# Patient Record
Sex: MALE | Race: WHITE | NOT HISPANIC OR LATINO | Employment: OTHER | URBAN - METROPOLITAN AREA
[De-identification: names, ages, dates, MRNs, and addresses within clinical notes are randomized per-mention and may not be internally consistent; named-entity substitution may affect disease eponyms.]

---

## 2017-02-14 ENCOUNTER — COMMUNICATION - HEALTHEAST (OUTPATIENT)
Dept: FAMILY MEDICINE | Facility: CLINIC | Age: 72
End: 2017-02-14

## 2017-02-14 DIAGNOSIS — E78.5 HYPERLIPIDEMIA: ICD-10-CM

## 2017-02-14 DIAGNOSIS — K21.00 REFLUX ESOPHAGITIS: ICD-10-CM

## 2017-04-18 ENCOUNTER — RECORDS - HEALTHEAST (OUTPATIENT)
Dept: ADMINISTRATIVE | Facility: OTHER | Age: 72
End: 2017-04-18

## 2017-05-08 ENCOUNTER — RECORDS - HEALTHEAST (OUTPATIENT)
Dept: ADMINISTRATIVE | Facility: OTHER | Age: 72
End: 2017-05-08

## 2017-05-22 ENCOUNTER — RECORDS - HEALTHEAST (OUTPATIENT)
Dept: ADMINISTRATIVE | Facility: OTHER | Age: 72
End: 2017-05-22

## 2017-10-17 ENCOUNTER — RECORDS - HEALTHEAST (OUTPATIENT)
Dept: ADMINISTRATIVE | Facility: OTHER | Age: 72
End: 2017-10-17

## 2017-10-23 ENCOUNTER — RECORDS - HEALTHEAST (OUTPATIENT)
Dept: ADMINISTRATIVE | Facility: OTHER | Age: 72
End: 2017-10-23

## 2017-11-21 ENCOUNTER — AMBULATORY - HEALTHEAST (OUTPATIENT)
Dept: NURSING | Facility: CLINIC | Age: 72
End: 2017-11-21

## 2017-11-21 DIAGNOSIS — Z23 IMMUNIZATION DUE: ICD-10-CM

## 2017-12-26 ENCOUNTER — RECORDS - HEALTHEAST (OUTPATIENT)
Dept: ADMINISTRATIVE | Facility: OTHER | Age: 72
End: 2017-12-26

## 2018-02-06 ENCOUNTER — OFFICE VISIT - HEALTHEAST (OUTPATIENT)
Dept: FAMILY MEDICINE | Facility: CLINIC | Age: 73
End: 2018-02-06

## 2018-02-06 DIAGNOSIS — H26.9 CATARACTS, BILATERAL: ICD-10-CM

## 2018-02-06 DIAGNOSIS — R73.09 OTHER ABNORMAL GLUCOSE: ICD-10-CM

## 2018-02-06 DIAGNOSIS — Z01.818 PRE-OP EXAMINATION: ICD-10-CM

## 2018-02-06 DIAGNOSIS — K21.00 REFLUX ESOPHAGITIS: ICD-10-CM

## 2018-02-06 DIAGNOSIS — E78.5 HYPERLIPIDEMIA: ICD-10-CM

## 2018-02-06 LAB
ALBUMIN SERPL-MCNC: 3.8 G/DL (ref 3.5–5)
ALP SERPL-CCNC: 66 U/L (ref 45–120)
ALT SERPL W P-5'-P-CCNC: 18 U/L (ref 0–45)
ANION GAP SERPL CALCULATED.3IONS-SCNC: 8 MMOL/L (ref 5–18)
AST SERPL W P-5'-P-CCNC: 16 U/L (ref 0–40)
ATRIAL RATE - MUSE: 48 BPM
BILIRUB DIRECT SERPL-MCNC: 0.2 MG/DL
BILIRUB SERPL-MCNC: 0.5 MG/DL (ref 0–1)
BUN SERPL-MCNC: 21 MG/DL (ref 8–28)
CALCIUM SERPL-MCNC: 9.5 MG/DL (ref 8.5–10.5)
CHLORIDE BLD-SCNC: 106 MMOL/L (ref 98–107)
CO2 SERPL-SCNC: 28 MMOL/L (ref 22–31)
CREAT SERPL-MCNC: 0.8 MG/DL (ref 0.7–1.3)
DIASTOLIC BLOOD PRESSURE - MUSE: NORMAL MMHG
GFR SERPL CREATININE-BSD FRML MDRD: >60 ML/MIN/1.73M2
GLUCOSE BLD-MCNC: 113 MG/DL (ref 70–125)
HBA1C MFR BLD: 6.6 % (ref 3.5–6)
HGB BLD-MCNC: 15.6 G/DL (ref 14–18)
INTERPRETATION ECG - MUSE: NORMAL
P AXIS - MUSE: 82 DEGREES
POTASSIUM BLD-SCNC: 4.4 MMOL/L (ref 3.5–5)
PR INTERVAL - MUSE: 174 MS
PROT SERPL-MCNC: 6.6 G/DL (ref 6–8)
QRS DURATION - MUSE: 102 MS
QT - MUSE: 454 MS
QTC - MUSE: 405 MS
R AXIS - MUSE: 40 DEGREES
SODIUM SERPL-SCNC: 142 MMOL/L (ref 136–145)
SYSTOLIC BLOOD PRESSURE - MUSE: NORMAL MMHG
T AXIS - MUSE: 94 DEGREES
VENTRICULAR RATE- MUSE: 48 BPM

## 2018-02-06 ASSESSMENT — MIFFLIN-ST. JEOR: SCORE: 1922.41

## 2018-03-26 ENCOUNTER — OFFICE VISIT - HEALTHEAST (OUTPATIENT)
Dept: FAMILY MEDICINE | Facility: CLINIC | Age: 73
End: 2018-03-26

## 2018-03-26 DIAGNOSIS — K21.00 REFLUX ESOPHAGITIS: ICD-10-CM

## 2018-03-26 DIAGNOSIS — E11.9 DIABETES MELLITUS TYPE 2, NONINSULIN DEPENDENT (H): ICD-10-CM

## 2018-03-26 DIAGNOSIS — E78.5 HYPERLIPIDEMIA: ICD-10-CM

## 2018-03-26 LAB
CREAT UR-MCNC: 224.5 MG/DL
MICROALBUMIN UR-MCNC: 2 MG/DL (ref 0–1.99)
MICROALBUMIN/CREAT UR: 8.9 MG/G

## 2018-03-30 ENCOUNTER — COMMUNICATION - HEALTHEAST (OUTPATIENT)
Dept: FAMILY MEDICINE | Facility: CLINIC | Age: 73
End: 2018-03-30

## 2018-04-13 ENCOUNTER — AMBULATORY - HEALTHEAST (OUTPATIENT)
Dept: EDUCATION SERVICES | Facility: CLINIC | Age: 73
End: 2018-04-13

## 2018-04-13 DIAGNOSIS — E11.9 TYPE 2 DIABETES MELLITUS WITHOUT COMPLICATION, WITHOUT LONG-TERM CURRENT USE OF INSULIN (H): ICD-10-CM

## 2018-04-18 ENCOUNTER — RECORDS - HEALTHEAST (OUTPATIENT)
Dept: ADMINISTRATIVE | Facility: OTHER | Age: 73
End: 2018-04-18

## 2018-04-30 ENCOUNTER — RECORDS - HEALTHEAST (OUTPATIENT)
Dept: ADMINISTRATIVE | Facility: OTHER | Age: 73
End: 2018-04-30

## 2018-05-11 ENCOUNTER — OFFICE VISIT - HEALTHEAST (OUTPATIENT)
Dept: EDUCATION SERVICES | Facility: CLINIC | Age: 73
End: 2018-05-11

## 2018-05-11 DIAGNOSIS — E11.9 TYPE 2 DIABETES MELLITUS WITHOUT COMPLICATION, WITHOUT LONG-TERM CURRENT USE OF INSULIN (H): ICD-10-CM

## 2018-08-15 ENCOUNTER — OFFICE VISIT - HEALTHEAST (OUTPATIENT)
Dept: FAMILY MEDICINE | Facility: CLINIC | Age: 73
End: 2018-08-15

## 2018-08-15 DIAGNOSIS — E11.9 TYPE 2 DIABETES MELLITUS WITHOUT COMPLICATION, WITHOUT LONG-TERM CURRENT USE OF INSULIN (H): ICD-10-CM

## 2018-08-15 DIAGNOSIS — E11.9 DIABETES MELLITUS TYPE 2, NONINSULIN DEPENDENT (H): ICD-10-CM

## 2018-08-15 DIAGNOSIS — E78.5 HYPERLIPIDEMIA: ICD-10-CM

## 2018-08-15 DIAGNOSIS — K21.00 REFLUX ESOPHAGITIS: ICD-10-CM

## 2018-08-15 LAB
ALBUMIN SERPL-MCNC: 4.3 G/DL (ref 3.5–5)
ALP SERPL-CCNC: 66 U/L (ref 45–120)
ALT SERPL W P-5'-P-CCNC: 17 U/L (ref 0–45)
ANION GAP SERPL CALCULATED.3IONS-SCNC: 9 MMOL/L (ref 5–18)
AST SERPL W P-5'-P-CCNC: 17 U/L (ref 0–40)
BILIRUB DIRECT SERPL-MCNC: 0.3 MG/DL
BILIRUB SERPL-MCNC: 1.2 MG/DL (ref 0–1)
BUN SERPL-MCNC: 27 MG/DL (ref 8–28)
CALCIUM SERPL-MCNC: 10 MG/DL (ref 8.5–10.5)
CHLORIDE BLD-SCNC: 107 MMOL/L (ref 98–107)
CHOLEST SERPL-MCNC: 162 MG/DL
CO2 SERPL-SCNC: 26 MMOL/L (ref 22–31)
CREAT SERPL-MCNC: 0.84 MG/DL (ref 0.7–1.3)
FASTING STATUS PATIENT QL REPORTED: YES
GFR SERPL CREATININE-BSD FRML MDRD: >60 ML/MIN/1.73M2
GLUCOSE BLD-MCNC: 125 MG/DL (ref 70–125)
HBA1C MFR BLD: 6.1 % (ref 3.5–6)
HDLC SERPL-MCNC: 35 MG/DL
HGB BLD-MCNC: 15.4 G/DL (ref 14–18)
LDLC SERPL CALC-MCNC: 102 MG/DL
POTASSIUM BLD-SCNC: 5 MMOL/L (ref 3.5–5)
PROT SERPL-MCNC: 6.8 G/DL (ref 6–8)
SODIUM SERPL-SCNC: 142 MMOL/L (ref 136–145)
TRIGL SERPL-MCNC: 123 MG/DL

## 2018-08-15 RX ORDER — GLUCOSAMINE HCL/CHONDROITIN SU 500-400 MG
CAPSULE ORAL
Qty: 100 STRIP | Refills: 4 | Status: SHIPPED | OUTPATIENT
Start: 2018-08-15 | End: 2021-09-30

## 2018-08-15 ASSESSMENT — MIFFLIN-ST. JEOR: SCORE: 1837.92

## 2018-10-01 ENCOUNTER — COMMUNICATION - HEALTHEAST (OUTPATIENT)
Dept: FAMILY MEDICINE | Facility: CLINIC | Age: 73
End: 2018-10-01

## 2018-10-01 DIAGNOSIS — K21.00 REFLUX ESOPHAGITIS: ICD-10-CM

## 2018-10-27 ENCOUNTER — AMBULATORY - HEALTHEAST (OUTPATIENT)
Dept: NURSING | Facility: CLINIC | Age: 73
End: 2018-10-27

## 2018-10-27 DIAGNOSIS — Z23 FLU VACCINE NEED: ICD-10-CM

## 2018-10-31 ENCOUNTER — RECORDS - HEALTHEAST (OUTPATIENT)
Dept: ADMINISTRATIVE | Facility: OTHER | Age: 73
End: 2018-10-31

## 2018-11-29 ENCOUNTER — COMMUNICATION - HEALTHEAST (OUTPATIENT)
Dept: FAMILY MEDICINE | Facility: CLINIC | Age: 73
End: 2018-11-29

## 2018-12-06 ENCOUNTER — RECORDS - HEALTHEAST (OUTPATIENT)
Dept: ADMINISTRATIVE | Facility: OTHER | Age: 73
End: 2018-12-06

## 2018-12-12 ENCOUNTER — OFFICE VISIT - HEALTHEAST (OUTPATIENT)
Dept: FAMILY MEDICINE | Facility: CLINIC | Age: 73
End: 2018-12-12

## 2018-12-12 DIAGNOSIS — I10 BENIGN ESSENTIAL HYPERTENSION: ICD-10-CM

## 2018-12-12 DIAGNOSIS — E11.9 DIABETES MELLITUS TYPE 2, NONINSULIN DEPENDENT (H): ICD-10-CM

## 2018-12-12 DIAGNOSIS — Z12.11 SCREEN FOR COLON CANCER: ICD-10-CM

## 2018-12-12 DIAGNOSIS — E78.2 MIXED HYPERLIPIDEMIA: ICD-10-CM

## 2018-12-12 LAB
ALBUMIN SERPL-MCNC: 4.3 G/DL (ref 3.5–5)
ALP SERPL-CCNC: 66 U/L (ref 45–120)
ALT SERPL W P-5'-P-CCNC: 19 U/L (ref 0–45)
ANION GAP SERPL CALCULATED.3IONS-SCNC: 9 MMOL/L (ref 5–18)
AST SERPL W P-5'-P-CCNC: 16 U/L (ref 0–40)
BILIRUB DIRECT SERPL-MCNC: 0.4 MG/DL
BILIRUB SERPL-MCNC: 1.4 MG/DL (ref 0–1)
BUN SERPL-MCNC: 20 MG/DL (ref 8–28)
CALCIUM SERPL-MCNC: 9.9 MG/DL (ref 8.5–10.5)
CHLORIDE BLD-SCNC: 105 MMOL/L (ref 98–107)
CHOLEST SERPL-MCNC: 132 MG/DL
CO2 SERPL-SCNC: 28 MMOL/L (ref 22–31)
CREAT SERPL-MCNC: 0.77 MG/DL (ref 0.7–1.3)
FASTING STATUS PATIENT QL REPORTED: NO
GFR SERPL CREATININE-BSD FRML MDRD: >60 ML/MIN/1.73M2
GLUCOSE BLD-MCNC: 116 MG/DL (ref 70–125)
HBA1C MFR BLD: 6.3 % (ref 3.5–6)
HDLC SERPL-MCNC: 37 MG/DL
LDLC SERPL CALC-MCNC: 70 MG/DL
POTASSIUM BLD-SCNC: 4.6 MMOL/L (ref 3.5–5)
PROT SERPL-MCNC: 6.8 G/DL (ref 6–8)
SODIUM SERPL-SCNC: 142 MMOL/L (ref 136–145)
TRIGL SERPL-MCNC: 124 MG/DL

## 2018-12-28 ENCOUNTER — OFFICE VISIT - HEALTHEAST (OUTPATIENT)
Dept: FAMILY MEDICINE | Facility: CLINIC | Age: 73
End: 2018-12-28

## 2018-12-28 DIAGNOSIS — R04.0 EPISTAXIS: ICD-10-CM

## 2018-12-28 DIAGNOSIS — I10 BENIGN ESSENTIAL HYPERTENSION: ICD-10-CM

## 2019-01-03 ENCOUNTER — OFFICE VISIT - HEALTHEAST (OUTPATIENT)
Dept: OTOLARYNGOLOGY | Facility: CLINIC | Age: 74
End: 2019-01-03

## 2019-01-03 DIAGNOSIS — R04.0 EPISTAXIS: ICD-10-CM

## 2019-01-22 ENCOUNTER — COMMUNICATION - HEALTHEAST (OUTPATIENT)
Dept: FAMILY MEDICINE | Facility: CLINIC | Age: 74
End: 2019-01-22

## 2019-01-22 DIAGNOSIS — E78.2 MIXED HYPERLIPIDEMIA: ICD-10-CM

## 2019-01-22 DIAGNOSIS — I10 BENIGN ESSENTIAL HYPERTENSION: ICD-10-CM

## 2019-04-22 ENCOUNTER — OFFICE VISIT - HEALTHEAST (OUTPATIENT)
Dept: FAMILY MEDICINE | Facility: CLINIC | Age: 74
End: 2019-04-22

## 2019-04-22 DIAGNOSIS — E11.9 DIABETES MELLITUS TYPE 2, NONINSULIN DEPENDENT (H): ICD-10-CM

## 2019-04-22 DIAGNOSIS — K21.00 REFLUX ESOPHAGITIS: ICD-10-CM

## 2019-04-22 DIAGNOSIS — E78.2 MIXED HYPERLIPIDEMIA: ICD-10-CM

## 2019-04-22 DIAGNOSIS — I10 BENIGN ESSENTIAL HYPERTENSION: ICD-10-CM

## 2019-04-22 LAB
ALBUMIN SERPL-MCNC: 4.2 G/DL (ref 3.5–5)
ALP SERPL-CCNC: 69 U/L (ref 45–120)
ALT SERPL W P-5'-P-CCNC: 20 U/L (ref 0–45)
ANION GAP SERPL CALCULATED.3IONS-SCNC: 9 MMOL/L (ref 5–18)
AST SERPL W P-5'-P-CCNC: 16 U/L (ref 0–40)
BILIRUB DIRECT SERPL-MCNC: 0.3 MG/DL
BILIRUB SERPL-MCNC: 0.9 MG/DL (ref 0–1)
BUN SERPL-MCNC: 25 MG/DL (ref 8–28)
CALCIUM SERPL-MCNC: 9.6 MG/DL (ref 8.5–10.5)
CHLORIDE BLD-SCNC: 105 MMOL/L (ref 98–107)
CHOLEST SERPL-MCNC: 119 MG/DL
CO2 SERPL-SCNC: 25 MMOL/L (ref 22–31)
CREAT SERPL-MCNC: 0.85 MG/DL (ref 0.7–1.3)
CREAT UR-MCNC: 252.8 MG/DL
FASTING STATUS PATIENT QL REPORTED: YES
GFR SERPL CREATININE-BSD FRML MDRD: >60 ML/MIN/1.73M2
GLUCOSE BLD-MCNC: 140 MG/DL (ref 70–125)
HBA1C MFR BLD: 6.8 % (ref 3.5–6)
HDLC SERPL-MCNC: 29 MG/DL
LDLC SERPL CALC-MCNC: 62 MG/DL
MICROALBUMIN UR-MCNC: 1.9 MG/DL (ref 0–1.99)
MICROALBUMIN/CREAT UR: 7.5 MG/G
POTASSIUM BLD-SCNC: 4.2 MMOL/L (ref 3.5–5)
PROT SERPL-MCNC: 6.6 G/DL (ref 6–8)
SODIUM SERPL-SCNC: 139 MMOL/L (ref 136–145)
TRIGL SERPL-MCNC: 142 MG/DL

## 2019-04-22 ASSESSMENT — MIFFLIN-ST. JEOR: SCORE: 1861.17

## 2019-05-01 ENCOUNTER — RECORDS - HEALTHEAST (OUTPATIENT)
Dept: ADMINISTRATIVE | Facility: OTHER | Age: 74
End: 2019-05-01

## 2019-05-09 ENCOUNTER — OFFICE VISIT - HEALTHEAST (OUTPATIENT)
Dept: OTOLARYNGOLOGY | Facility: CLINIC | Age: 74
End: 2019-05-09

## 2019-05-09 DIAGNOSIS — R04.0 EPISTAXIS: ICD-10-CM

## 2019-05-29 ENCOUNTER — COMMUNICATION - HEALTHEAST (OUTPATIENT)
Dept: FAMILY MEDICINE | Facility: CLINIC | Age: 74
End: 2019-05-29

## 2019-06-26 ENCOUNTER — RECORDS - HEALTHEAST (OUTPATIENT)
Dept: ADMINISTRATIVE | Facility: OTHER | Age: 74
End: 2019-06-26

## 2019-07-02 ENCOUNTER — RECORDS - HEALTHEAST (OUTPATIENT)
Dept: HEALTH INFORMATION MANAGEMENT | Facility: CLINIC | Age: 74
End: 2019-07-02

## 2019-10-26 ENCOUNTER — COMMUNICATION - HEALTHEAST (OUTPATIENT)
Dept: FAMILY MEDICINE | Facility: CLINIC | Age: 74
End: 2019-10-26

## 2019-10-26 DIAGNOSIS — I10 BENIGN ESSENTIAL HYPERTENSION: ICD-10-CM

## 2019-10-26 DIAGNOSIS — K21.00 REFLUX ESOPHAGITIS: ICD-10-CM

## 2019-10-26 DIAGNOSIS — E78.2 MIXED HYPERLIPIDEMIA: ICD-10-CM

## 2019-11-25 ENCOUNTER — OFFICE VISIT - HEALTHEAST (OUTPATIENT)
Dept: FAMILY MEDICINE | Facility: CLINIC | Age: 74
End: 2019-11-25

## 2019-11-25 DIAGNOSIS — E11.9 DIABETES MELLITUS TYPE 2, NONINSULIN DEPENDENT (H): ICD-10-CM

## 2019-11-25 DIAGNOSIS — Z86.0100 HISTORY OF COLONIC POLYPS: ICD-10-CM

## 2019-11-25 DIAGNOSIS — Z11.59 NEED FOR HEPATITIS C SCREENING TEST: ICD-10-CM

## 2019-11-25 DIAGNOSIS — Z00.00 MEDICARE ANNUAL WELLNESS VISIT, SUBSEQUENT: ICD-10-CM

## 2019-11-25 DIAGNOSIS — Z23 NEED FOR VACCINATION: ICD-10-CM

## 2019-11-25 DIAGNOSIS — E78.2 MIXED HYPERLIPIDEMIA: ICD-10-CM

## 2019-11-25 DIAGNOSIS — Z12.11 SPECIAL SCREENING FOR MALIGNANT NEOPLASMS, COLON: ICD-10-CM

## 2019-11-25 DIAGNOSIS — I10 BENIGN ESSENTIAL HYPERTENSION: ICD-10-CM

## 2019-11-25 DIAGNOSIS — K21.00 REFLUX ESOPHAGITIS: ICD-10-CM

## 2019-11-25 LAB
ALBUMIN SERPL-MCNC: 4.2 G/DL (ref 3.5–5)
ALP SERPL-CCNC: 74 U/L (ref 45–120)
ALT SERPL W P-5'-P-CCNC: 18 U/L (ref 0–45)
AST SERPL W P-5'-P-CCNC: 16 U/L (ref 0–40)
BILIRUB DIRECT SERPL-MCNC: 0.4 MG/DL
BILIRUB SERPL-MCNC: 1.3 MG/DL (ref 0–1)
CHOLEST SERPL-MCNC: 129 MG/DL
FASTING STATUS PATIENT QL REPORTED: YES
HBA1C MFR BLD: 6.2 % (ref 3.5–6)
HDLC SERPL-MCNC: 32 MG/DL
HGB BLD-MCNC: 16.2 G/DL (ref 14–18)
LDLC SERPL CALC-MCNC: 73 MG/DL
PROT SERPL-MCNC: 6.8 G/DL (ref 6–8)
TRIGL SERPL-MCNC: 118 MG/DL

## 2019-11-25 ASSESSMENT — MIFFLIN-ST. JEOR: SCORE: 1815.81

## 2019-11-26 LAB
ANION GAP SERPL CALCULATED.3IONS-SCNC: 11 MMOL/L (ref 5–18)
BUN SERPL-MCNC: 24 MG/DL (ref 8–28)
CALCIUM SERPL-MCNC: 9.8 MG/DL (ref 8.5–10.5)
CHLORIDE BLD-SCNC: 107 MMOL/L (ref 98–107)
CO2 SERPL-SCNC: 25 MMOL/L (ref 22–31)
CREAT SERPL-MCNC: 0.88 MG/DL (ref 0.7–1.3)
GFR SERPL CREATININE-BSD FRML MDRD: >60 ML/MIN/1.73M2
GLUCOSE BLD-MCNC: 128 MG/DL (ref 70–125)
HCV AB SERPL QL IA: NEGATIVE
POTASSIUM BLD-SCNC: 4.7 MMOL/L (ref 3.5–5)
SODIUM SERPL-SCNC: 143 MMOL/L (ref 136–145)

## 2019-12-04 ENCOUNTER — RECORDS - HEALTHEAST (OUTPATIENT)
Dept: ADMINISTRATIVE | Facility: OTHER | Age: 74
End: 2019-12-04

## 2019-12-26 ENCOUNTER — OFFICE VISIT - HEALTHEAST (OUTPATIENT)
Dept: FAMILY MEDICINE | Facility: CLINIC | Age: 74
End: 2019-12-26

## 2019-12-26 DIAGNOSIS — D17.30 LIPOMA OF SKIN AND SUBCUTANEOUS TISSUE: ICD-10-CM

## 2020-05-06 ENCOUNTER — COMMUNICATION - HEALTHEAST (OUTPATIENT)
Dept: FAMILY MEDICINE | Facility: CLINIC | Age: 75
End: 2020-05-06

## 2020-05-08 ENCOUNTER — COMMUNICATION - HEALTHEAST (OUTPATIENT)
Dept: SCHEDULING | Facility: CLINIC | Age: 75
End: 2020-05-08

## 2020-05-11 ENCOUNTER — OFFICE VISIT - HEALTHEAST (OUTPATIENT)
Dept: FAMILY MEDICINE | Facility: CLINIC | Age: 75
End: 2020-05-11

## 2020-05-11 DIAGNOSIS — E78.2 MIXED HYPERLIPIDEMIA: ICD-10-CM

## 2020-05-11 DIAGNOSIS — F52.8 PSYCHOSEXUAL DYSFUNCTION WITH INHIBITED SEXUAL EXCITEMENT: ICD-10-CM

## 2020-05-11 DIAGNOSIS — K21.00 REFLUX ESOPHAGITIS: ICD-10-CM

## 2020-05-11 DIAGNOSIS — I10 BENIGN ESSENTIAL HYPERTENSION: ICD-10-CM

## 2020-05-11 DIAGNOSIS — E11.9 DIABETES MELLITUS TYPE 2, NONINSULIN DEPENDENT (H): ICD-10-CM

## 2020-06-05 ENCOUNTER — AMBULATORY - HEALTHEAST (OUTPATIENT)
Dept: LAB | Facility: CLINIC | Age: 75
End: 2020-06-05

## 2020-06-05 DIAGNOSIS — E11.9 DIABETES MELLITUS TYPE 2, NONINSULIN DEPENDENT (H): ICD-10-CM

## 2020-06-05 DIAGNOSIS — I10 BENIGN ESSENTIAL HYPERTENSION: ICD-10-CM

## 2020-06-05 DIAGNOSIS — K21.00 REFLUX ESOPHAGITIS: ICD-10-CM

## 2020-06-05 DIAGNOSIS — E78.2 MIXED HYPERLIPIDEMIA: ICD-10-CM

## 2020-06-05 LAB
ALBUMIN SERPL-MCNC: 4.2 G/DL (ref 3.5–5)
ALP SERPL-CCNC: 74 U/L (ref 45–120)
ALT SERPL W P-5'-P-CCNC: 18 U/L (ref 0–45)
ANION GAP SERPL CALCULATED.3IONS-SCNC: 8 MMOL/L (ref 5–18)
AST SERPL W P-5'-P-CCNC: 19 U/L (ref 0–40)
BILIRUB DIRECT SERPL-MCNC: 0.5 MG/DL
BILIRUB SERPL-MCNC: 1.3 MG/DL (ref 0–1)
BUN SERPL-MCNC: 26 MG/DL (ref 8–28)
CALCIUM SERPL-MCNC: 9.6 MG/DL (ref 8.5–10.5)
CHLORIDE BLD-SCNC: 107 MMOL/L (ref 98–107)
CHOLEST SERPL-MCNC: 117 MG/DL
CO2 SERPL-SCNC: 27 MMOL/L (ref 22–31)
CREAT SERPL-MCNC: 0.8 MG/DL (ref 0.7–1.3)
FASTING STATUS PATIENT QL REPORTED: YES
GFR SERPL CREATININE-BSD FRML MDRD: >60 ML/MIN/1.73M2
GLUCOSE BLD-MCNC: 120 MG/DL (ref 70–125)
HBA1C MFR BLD: 6.2 % (ref 3.5–6)
HDLC SERPL-MCNC: 38 MG/DL
HGB BLD-MCNC: 16 G/DL (ref 14–18)
LDLC SERPL CALC-MCNC: 61 MG/DL
POTASSIUM BLD-SCNC: 4.5 MMOL/L (ref 3.5–5)
PROT SERPL-MCNC: 6.8 G/DL (ref 6–8)
SODIUM SERPL-SCNC: 142 MMOL/L (ref 136–145)
TRIGL SERPL-MCNC: 92 MG/DL

## 2020-07-01 ENCOUNTER — RECORDS - HEALTHEAST (OUTPATIENT)
Dept: ADMINISTRATIVE | Facility: OTHER | Age: 75
End: 2020-07-01

## 2020-07-01 LAB — RETINOPATHY: NEGATIVE

## 2020-07-03 ENCOUNTER — OFFICE VISIT - HEALTHEAST (OUTPATIENT)
Dept: OTOLARYNGOLOGY | Facility: CLINIC | Age: 75
End: 2020-07-03

## 2020-07-03 DIAGNOSIS — J34.89 NASAL DRYNESS: ICD-10-CM

## 2020-07-03 DIAGNOSIS — R09.81 NASAL CONGESTION: ICD-10-CM

## 2020-07-06 ENCOUNTER — RECORDS - HEALTHEAST (OUTPATIENT)
Dept: HEALTH INFORMATION MANAGEMENT | Facility: CLINIC | Age: 75
End: 2020-07-06

## 2020-07-15 ENCOUNTER — RECORDS - HEALTHEAST (OUTPATIENT)
Dept: ADMINISTRATIVE | Facility: OTHER | Age: 75
End: 2020-07-15

## 2020-07-16 ENCOUNTER — COMMUNICATION - HEALTHEAST (OUTPATIENT)
Dept: FAMILY MEDICINE | Facility: CLINIC | Age: 75
End: 2020-07-16

## 2020-07-16 DIAGNOSIS — K21.00 REFLUX ESOPHAGITIS: ICD-10-CM

## 2020-07-16 DIAGNOSIS — I10 BENIGN ESSENTIAL HYPERTENSION: ICD-10-CM

## 2020-07-16 DIAGNOSIS — E78.2 MIXED HYPERLIPIDEMIA: ICD-10-CM

## 2020-10-07 ENCOUNTER — OFFICE VISIT - HEALTHEAST (OUTPATIENT)
Dept: FAMILY MEDICINE | Facility: CLINIC | Age: 75
End: 2020-10-07

## 2020-10-07 DIAGNOSIS — E11.9 DIABETES MELLITUS TYPE 2, NONINSULIN DEPENDENT (H): ICD-10-CM

## 2020-10-07 DIAGNOSIS — I10 BENIGN ESSENTIAL HYPERTENSION: ICD-10-CM

## 2020-10-07 DIAGNOSIS — Z00.00 ROUTINE ADULT HEALTH MAINTENANCE: ICD-10-CM

## 2020-10-07 LAB
ANION GAP SERPL CALCULATED.3IONS-SCNC: 7 MMOL/L (ref 5–18)
BUN SERPL-MCNC: 26 MG/DL (ref 8–28)
CALCIUM SERPL-MCNC: 9.4 MG/DL (ref 8.5–10.5)
CHLORIDE BLD-SCNC: 107 MMOL/L (ref 98–107)
CO2 SERPL-SCNC: 28 MMOL/L (ref 22–31)
CREAT SERPL-MCNC: 0.78 MG/DL (ref 0.7–1.3)
ERYTHROCYTE [DISTWIDTH] IN BLOOD BY AUTOMATED COUNT: 12.4 % (ref 11–14.5)
GFR SERPL CREATININE-BSD FRML MDRD: >60 ML/MIN/1.73M2
GLUCOSE BLD-MCNC: 129 MG/DL (ref 70–125)
HBA1C MFR BLD: 6.1 %
HCT VFR BLD AUTO: 45.1 % (ref 40–54)
HGB BLD-MCNC: 15.2 G/DL (ref 14–18)
MCH RBC QN AUTO: 30.1 PG (ref 27–34)
MCHC RBC AUTO-ENTMCNC: 33.6 G/DL (ref 32–36)
MCV RBC AUTO: 89 FL (ref 80–100)
PLATELET # BLD AUTO: 168 THOU/UL (ref 140–440)
PMV BLD AUTO: 9.3 FL (ref 7–10)
POTASSIUM BLD-SCNC: 4.2 MMOL/L (ref 3.5–5)
RBC # BLD AUTO: 5.05 MILL/UL (ref 4.4–6.2)
SODIUM SERPL-SCNC: 142 MMOL/L (ref 136–145)
WBC: 5.4 THOU/UL (ref 4–11)

## 2020-10-07 ASSESSMENT — MIFFLIN-ST. JEOR: SCORE: 1765.91

## 2020-10-12 ENCOUNTER — COMMUNICATION - HEALTHEAST (OUTPATIENT)
Dept: FAMILY MEDICINE | Facility: CLINIC | Age: 75
End: 2020-10-12

## 2021-01-13 ENCOUNTER — COMMUNICATION - HEALTHEAST (OUTPATIENT)
Dept: FAMILY MEDICINE | Facility: CLINIC | Age: 76
End: 2021-01-13

## 2021-01-13 DIAGNOSIS — K21.00 REFLUX ESOPHAGITIS: ICD-10-CM

## 2021-01-13 DIAGNOSIS — I10 BENIGN ESSENTIAL HYPERTENSION: ICD-10-CM

## 2021-01-13 DIAGNOSIS — E78.2 MIXED HYPERLIPIDEMIA: ICD-10-CM

## 2021-01-13 RX ORDER — LOSARTAN POTASSIUM 50 MG/1
TABLET ORAL
Qty: 90 TABLET | Refills: 2 | Status: SHIPPED | OUTPATIENT
Start: 2021-01-13 | End: 2021-10-08

## 2021-01-13 RX ORDER — ATORVASTATIN CALCIUM 40 MG/1
TABLET, FILM COATED ORAL
Qty: 90 TABLET | Refills: 2 | Status: SHIPPED | OUTPATIENT
Start: 2021-01-13 | End: 2021-10-11

## 2021-02-02 ENCOUNTER — COMMUNICATION - HEALTHEAST (OUTPATIENT)
Dept: FAMILY MEDICINE | Facility: CLINIC | Age: 76
End: 2021-02-02

## 2021-02-12 ENCOUNTER — AMBULATORY - HEALTHEAST (OUTPATIENT)
Dept: NURSING | Facility: CLINIC | Age: 76
End: 2021-02-12

## 2021-03-05 ENCOUNTER — RECORDS - HEALTHEAST (OUTPATIENT)
Dept: ADMINISTRATIVE | Facility: OTHER | Age: 76
End: 2021-03-05

## 2021-03-05 ENCOUNTER — AMBULATORY - HEALTHEAST (OUTPATIENT)
Dept: NURSING | Facility: CLINIC | Age: 76
End: 2021-03-05

## 2021-04-14 ENCOUNTER — COMMUNICATION - HEALTHEAST (OUTPATIENT)
Dept: LAB | Facility: CLINIC | Age: 76
End: 2021-04-14

## 2021-04-15 ENCOUNTER — OFFICE VISIT - HEALTHEAST (OUTPATIENT)
Dept: FAMILY MEDICINE | Facility: CLINIC | Age: 76
End: 2021-04-15

## 2021-04-15 DIAGNOSIS — Z00.00 WELLNESS EXAMINATION: ICD-10-CM

## 2021-04-15 DIAGNOSIS — Z12.5 ENCOUNTER FOR SCREENING FOR MALIGNANT NEOPLASM OF PROSTATE: ICD-10-CM

## 2021-04-15 LAB
ANION GAP SERPL CALCULATED.3IONS-SCNC: 7 MMOL/L (ref 5–18)
BUN SERPL-MCNC: 31 MG/DL (ref 8–28)
CALCIUM SERPL-MCNC: 9.6 MG/DL (ref 8.5–10.5)
CHLORIDE BLD-SCNC: 109 MMOL/L (ref 98–107)
CO2 SERPL-SCNC: 27 MMOL/L (ref 22–31)
CREAT SERPL-MCNC: 0.86 MG/DL (ref 0.7–1.3)
ERYTHROCYTE [DISTWIDTH] IN BLOOD BY AUTOMATED COUNT: 12.8 % (ref 11–14.5)
GFR SERPL CREATININE-BSD FRML MDRD: >60 ML/MIN/1.73M2
GLUCOSE BLD-MCNC: 99 MG/DL (ref 70–125)
HBA1C MFR BLD: 6.6 %
HCT VFR BLD AUTO: 47.4 % (ref 40–54)
HGB BLD-MCNC: 15.8 G/DL (ref 14–18)
MCH RBC QN AUTO: 30.7 PG (ref 27–34)
MCHC RBC AUTO-ENTMCNC: 33.3 G/DL (ref 32–36)
MCV RBC AUTO: 92 FL (ref 80–100)
PLATELET # BLD AUTO: 166 THOU/UL (ref 140–440)
PMV BLD AUTO: 11.2 FL (ref 7–10)
POTASSIUM BLD-SCNC: 4.2 MMOL/L (ref 3.5–5)
PSA SERPL-MCNC: 1.9 NG/ML (ref 0–6.5)
RBC # BLD AUTO: 5.15 MILL/UL (ref 4.4–6.2)
SODIUM SERPL-SCNC: 143 MMOL/L (ref 136–145)
WBC: 7.9 THOU/UL (ref 4–11)

## 2021-04-15 ASSESSMENT — MIFFLIN-ST. JEOR: SCORE: 1820.34

## 2021-04-19 ENCOUNTER — COMMUNICATION - HEALTHEAST (OUTPATIENT)
Dept: FAMILY MEDICINE | Facility: CLINIC | Age: 76
End: 2021-04-19

## 2021-05-28 NOTE — PROGRESS NOTES
Assessment:  1.  Non-insulin-dependent diabetes mellitus  2.  Hypertension controlled.  3.  Hyperlipidemia, checking status.  4.  Chronic reflux esophagitis controlled.  5.  Occasional epistaxis this last winter.        Plan: Okay if he takes the aspirin less than daily.  Check fasting A1c, lipid hepatic and basic profiles and microalbumin.  Continue current medication and refills done on his atorvastatin, omeprazole, and losartan.  Plan follow-up in 4 months for next diabetic recheck.  Encouraged him to schedule with ophthalmologist for screening diabetic retinal exam.  Encouraged him regarding regular daily exercise and continued careful diabetic diet.  Encouraged him to check blood sugars more often.  If further difficulty with nosebleeds then let me know.    Subjective: 73-year-old male presenting for follow-up on the above.  Regarding diabetes he does not check blood sugars very often but they are usually in the 125-130 range when he does check.  He has not been exercising regular but does plan to get back to doing daily exercise.  Regarding hypertension no headaches or dizziness or leg swelling.  Regarding lipids no diarrhea constipation muscle aching or muscle weakness.  No trouble with heartburn.  He had had some nosebleeds this winter and he did see ENT and they just recommended to use normal saline nose spray.  Past Medical History:   Diagnosis Date     Benign essential hypertension 12/12/2018     Diabetes mellitus type 2, noninsulin dependent (H) 2/7/2018     Hyperlipemia      Reflux esophagitis      No Known Allergies  Current Outpatient Medications   Medication Sig Dispense Refill     aspirin 81 MG EC tablet Take 81 mg by mouth daily.       atorvastatin (LIPITOR) 40 MG tablet Take 1 tablet (40 mg total) by mouth daily. 90 tablet 1     losartan (COZAAR) 50 MG tablet Take 1 tablet (50 mg total) by mouth daily. Take one tablet by mouth daily 90 tablet 1     multivitamin therapeutic (THERAGRAN) tablet Take  "1 tablet by mouth daily.       naproxen sodium (ALEVE) 220 MG tablet Take 220 mg by mouth every 12 (twelve) hours as needed.       omeprazole (PRILOSEC) 20 MG capsule Take 1 capsule (20 mg total) by mouth daily before breakfast. 90 capsule 1     blood glucose control, high Soln Use as directed 1 each 1     blood glucose test strips Use to test blood sugar once per day. One Touch Verio test strips or strips covered by insurance 100 strip 4     lancets (ONETOUCH DELICA LANCETS) 30 gauge Misc Use to test blood sugar once per day. 100 each 4     sildenafil (VIAGRA) 50 MG tablet Take 50 mg by mouth. One hour prior to sexual activity.       No current facility-administered medications for this visit.      All other review of systems are negative.    Objective:/80   Pulse 75   Ht 6' 2\" (1.88 m)   Wt (!) 234 lb (106.1 kg)   SpO2 98%   BMI 30.04 kg/m    HEENT examination is negative for any acute change.  Neck supple without adenopathy or thyromegaly.  Lungs clear.  Heart regular rate and rhythm without murmur.  Abdomen shows no masses tenderness or hepatosplenomegaly.  No pedal edema.  Alert with clear speech.  "

## 2021-05-28 NOTE — PROGRESS NOTES
HPI: This patient is a 72yo M who presents back to clinic for evaluation of epistaxis. States that it has been going onout of the right side since he was seen last in Jan. He couldn't be cauterized at that time given a location that isn't amenable to that treatment. They start and stop spontaneously and are mainly just spots on the kleenex after he blows his nose. None of them have required an ER visit or packing. He is using nasal saline every day.      Past medical history, surgical history, social history, family history, medications, and allergies have been reviewed with the patient and are documented above.     Review of Systems: a 10-system review was performed. Pertinent positives are noted in the HPI and on a separate scanned document in the chart.     PHYSICAL EXAMINATION:  GEN: no acute distress, normocephalic  EYES: extraocular movements are intact, pupils are equal and round. Sclera clear.   NOSE: anterior nares are patent. There are no masses or lesions. The septum is non-obstructing, but dry with an exposed vessel on the right septum this time that was cauterized without difficulty.   NEURO: CN VII and XII symmetric. alert and oriented. No spontaneous nystagmus. Gait is normal.  PULM: breathing comfortably on room air, normal chest expansion with respiration     MEDICAL DECISION-MAKING: This patient is a 72yo M with epistaxis from the right septum. The area in question was cauterized today in clinicl. Advised on continued nasal saline and aquaphor use. RTC PRN.

## 2021-05-29 NOTE — TELEPHONE ENCOUNTER
Left message to call back for: pt  Information to relay to patient:  Pt due for colon cancer screening. Would he like to do colonoscopy or stool cards? If colonoscopy he can call [facility] at [phone] to set up or he can  stool cards here at clinic.

## 2021-05-31 VITALS — BODY MASS INDEX: 30.34 KG/M2 | HEIGHT: 75 IN | WEIGHT: 244 LBS

## 2021-06-01 VITALS — BODY MASS INDEX: 29.26 KG/M2 | HEIGHT: 74 IN | WEIGHT: 228 LBS

## 2021-06-01 VITALS — WEIGHT: 241 LBS | BODY MASS INDEX: 30.12 KG/M2

## 2021-06-01 VITALS — BODY MASS INDEX: 29.5 KG/M2 | WEIGHT: 236 LBS

## 2021-06-01 VITALS — WEIGHT: 231.9 LBS | BODY MASS INDEX: 28.99 KG/M2

## 2021-06-02 VITALS — BODY MASS INDEX: 29.2 KG/M2 | WEIGHT: 229 LBS

## 2021-06-02 VITALS — WEIGHT: 234 LBS | HEIGHT: 74 IN | BODY MASS INDEX: 30.03 KG/M2

## 2021-06-02 VITALS — WEIGHT: 229.06 LBS | BODY MASS INDEX: 29.21 KG/M2

## 2021-06-02 NOTE — TELEPHONE ENCOUNTER
Refill Approved    Rx renewed per Medication Renewal Policy. Medication was last renewed on 4/22/2019           Сергей Harris, [facility] Triage/Med Refill 10/26/2019     Requested Prescriptions   Pending Prescriptions Disp Refills     losartan (COZAAR) 50 MG tablet [Pharmacy Med Name: LOSARTAN TABS 50MG] 90 tablet 4     Sig: TAKE 1 TABLET DAILY       Angiotensin Receptor Blocker Protocol Passed - 10/26/2019 10:23 AM        Passed - PCP or prescribing provider visit in past 12 months       Last office visit with prescriber/PCP: 4/22/2019 Taiwo García MD OR same dept: 4/22/2019 Taiwo García MD OR same specialty: 4/22/2019 Taiwo García MD  Last physical: 2/6/2018 Last MTM visit: Visit date not found   Next visit within 3 mo: Visit date not found  Next physical within 3 mo: Visit date not found  Prescriber OR PCP: Taiwo García MD  Last diagnosis associated with med order: 1. Benign essential hypertension  - losartan (COZAAR) 50 MG tablet [Pharmacy Med Name: LOSARTAN TABS 50MG]; TAKE 1 TABLET DAILY  Dispense: 90 tablet; Refill: 4    2. Mixed hyperlipidemia  - atorvastatin (LIPITOR) 40 MG tablet [Pharmacy Med Name: ATORVASTATIN TABS 40MG]; TAKE 1 TABLET DAILY  Dispense: 90 tablet; Refill: 4    3. Chronic Reflux Esophagitis  - omeprazole (PRILOSEC) 20 MG capsule [Pharmacy Med Name: OMEPRAZOLE DR CAPS 20MG]; TAKE 1 CAPSULE DAILY BEFORE BREAKFAST  Dispense: 90 capsule; Refill: 4    If protocol passes may refill for 12 months if within 3 months of last provider visit (or a total of 15 months).             Passed - Serum potassium within the past 12 months     Lab Results   Component Value Date    Potassium 4.2 04/22/2019             Passed - Blood pressure filed in past 12 months     BP Readings from Last 1 Encounters:   04/22/19 126/80             Passed - Serum creatinine within the past 12 months     Creatinine   Date Value Ref Range Status   04/22/2019 0.85 0.70 - 1.30 mg/dL Final              atorvastatin (LIPITOR) 40 MG tablet [Pharmacy Med Name: ATORVASTATIN TABS 40MG] 90 tablet 4     Sig: TAKE 1 TABLET DAILY       Statins Refill Protocol (Hmg CoA Reductase Inhibitors) Passed - 10/26/2019 10:23 AM        Passed - PCP or prescribing provider visit in past 12 months      Last office visit with prescriber/PCP: 4/22/2019 Taiwo García MD OR same dept: 4/22/2019 Taiwo García MD OR same specialty: 4/22/2019 Taiwo García MD  Last physical: 2/6/2018 Last MTM visit: Visit date not found   Next visit within 3 mo: Visit date not found  Next physical within 3 mo: Visit date not found  Prescriber OR PCP: Taiwo García MD  Last diagnosis associated with med order: 1. Benign essential hypertension  - losartan (COZAAR) 50 MG tablet [Pharmacy Med Name: LOSARTAN TABS 50MG]; TAKE 1 TABLET DAILY  Dispense: 90 tablet; Refill: 4    2. Mixed hyperlipidemia  - atorvastatin (LIPITOR) 40 MG tablet [Pharmacy Med Name: ATORVASTATIN TABS 40MG]; TAKE 1 TABLET DAILY  Dispense: 90 tablet; Refill: 4    3. Chronic Reflux Esophagitis  - omeprazole (PRILOSEC) 20 MG capsule [Pharmacy Med Name: OMEPRAZOLE DR CAPS 20MG]; TAKE 1 CAPSULE DAILY BEFORE BREAKFAST  Dispense: 90 capsule; Refill: 4    If protocol passes may refill for 12 months if within 3 months of last provider visit (or a total of 15 months).             omeprazole (PRILOSEC) 20 MG capsule [Pharmacy Med Name: OMEPRAZOLE DR CAPS 20MG] 90 capsule 4     Sig: TAKE 1 CAPSULE DAILY BEFORE BREAKFAST       GI Medications Refill Protocol Passed - 10/26/2019 10:23 AM        Passed - PCP or prescribing provider visit in last 12 or next 3 months.     Last office visit with prescriber/PCP: 4/22/2019 Taiwo García MD OR same dept: 4/22/2019 Taiwo García MD OR same specialty: 4/22/2019 Taiwo García MD  Last physical: 2/6/2018 Last MTM visit: Visit date not found   Next visit within 3 mo: Visit date not found  Next physical within 3 mo: Visit date not  found  Prescriber OR PCP: Taiwo García MD  Last diagnosis associated with med order: 1. Benign essential hypertension  - losartan (COZAAR) 50 MG tablet [Pharmacy Med Name: LOSARTAN TABS 50MG]; TAKE 1 TABLET DAILY  Dispense: 90 tablet; Refill: 4    2. Mixed hyperlipidemia  - atorvastatin (LIPITOR) 40 MG tablet [Pharmacy Med Name: ATORVASTATIN TABS 40MG]; TAKE 1 TABLET DAILY  Dispense: 90 tablet; Refill: 4    3. Chronic Reflux Esophagitis  - omeprazole (PRILOSEC) 20 MG capsule [Pharmacy Med Name: OMEPRAZOLE DR CAPS 20MG]; TAKE 1 CAPSULE DAILY BEFORE BREAKFAST  Dispense: 90 capsule; Refill: 4    If protocol passes may refill for 12 months if within 3 months of last provider visit (or a total of 15 months).

## 2021-06-03 NOTE — PROGRESS NOTES
Assessment and Plan:   Annual wellness visit.  1. Medicare annual wellness visit, subsequent     2. Need for vaccination  Influenza High Dose,Seasonal,PF 65+ Yrs   3. Diabetes mellitus type 2, noninsulin dependent (H)  Glycosylated Hemoglobin A1c    Basic Metabolic Panel   4. Mixed hyperlipidemia  Lipid Cascade    Hepatic Profile   5. Benign essential hypertension  Basic Metabolic Panel   6. Chronic Reflux Esophagitis  Hemoglobin   7. History of colonic polyps  Ambulatory referral for Colonoscopy   8. Special screening for malignant neoplasms, colon  Ambulatory referral for Colonoscopy   9. Need for hepatitis C screening test  Hepatitis C Antibody (Anti-HCV)     If A1c is adequately controlled and other lab okay etc.  Then follow-up in 4 months for next diabetic recheck.  Recommend as above.  Continue regular exercise etc.  Continue current medication.  Do refills on the atorvastatin, losartan, omeprazole, etc. as needed.  He does get ophthalmology check of his eyes once a year to screen for diabetic retinopathy.  He understands that because of his history of the colon polyps in the past that it is better for him to do the screening colonoscopy then do Cologuard.  Discussed shingles vaccine, he had the older vaccine in 2008, reviewed the risks and benefits of the newer vaccine in the cost etc.  He will check with his insurance on whether or not his supplement covers it.    The patient's current medical problems were reviewed.    I have had an Advance Directives discussion with the patient.  The following health maintenance schedule was reviewed with the patient and provided in printed form in the after visit summary:   Health Maintenance   Topic Date Due     HEPATITIS C SCREENING  1945     ZOSTER VACCINES (2 of 3) 04/01/2008     MEDICARE ANNUAL WELLNESS VISIT  11/16/2017     COLONOSCOPY  09/10/2018     INFLUENZA VACCINE RULE BASED (1) 08/01/2019     A1C  10/22/2019     FALL RISK ASSESSMENT  12/12/2019      BMP  04/22/2020     DIABETIC FOOT EXAM  04/22/2020     LIPID  04/22/2020     MICROALBUMIN  04/22/2020     DIABETIC EYE EXAM  06/26/2020     ADVANCE CARE PLANNING  11/16/2021     TD 18+ HE  05/29/2022     PNEUMOCOCCAL IMMUNIZATION 65+ LOW/MEDIUM RISK  Completed        Subjective:   Chief Complaint: Ruy Gibbs is an 74 y.o. male here for an Annual Wellness visit.   HPI:  Generally  Feels well. Only occasionally check blood sugars and does not recall his last readings.  Does try to follow his diet carefully and exercise regular.  Regarding hypertension, no headaches or dizziness or leg swelling.  He does take the losartan on a regular basis.  Regarding hyperlipidemia no diarrhea constipation muscle aching or muscle weakness.  He does take the atorvastatin on a regular basis.  Regarding reflux esophagitis, he does take the omeprazole on daily basis and is not having any heartburn or indigestion difficulties.    Review of Systems: No chest pain trouble breathing swelling in the legs etc.  Please see above.  The rest of the review of systems are negative for all systems.    Patient Care Team:  Taiwo García MD as PCP - General  Taiwo García MD as Assigned PCP     Patient Active Problem List   Diagnosis     Male Erectile Disorder     Hyperlipidemia     Chronic Reflux Esophagitis     Diabetes mellitus type 2, noninsulin dependent (H)     Benign essential hypertension     Past Medical History:   Diagnosis Date     Benign essential hypertension 12/12/2018     Diabetes mellitus type 2, noninsulin dependent (H) 2/7/2018     Hyperlipemia      Reflux esophagitis       Past Surgical History:   Procedure Laterality Date     CHOLECYSTECTOMY  Jan 2014    [facility] at [facility]     INGUINAL HERNIA REPAIR Left 4/13/2016    Procedure: LEFT INGUINAL HERNIA REPAIR;  Surgeon: Drew Way MD;  Location: [facility] OR;  Service:      VT REMOVAL OF TONSILS,<11 Y/O      Description: Tonsillectomy;   Recorded: 2008;     strabismus repair Bilateral 2003    [facility], also right inferior oblique myomectomy at same time     TONSILLECTOMY        Family History   Problem Relation Age of Onset     Prostate cancer Father 71        early 70s      Social History     Socioeconomic History     Marital status:      Spouse name: Not on file     Number of children: Not on file     Years of education: Not on file     Highest education level: Not on file   Occupational History     Not on file   Social Needs     Financial resource strain: Not on file     Food insecurity:     Worry: Not on file     Inability: Not on file     Transportation needs:     Medical: Not on file     Non-medical: Not on file   Tobacco Use     Smoking status: Former Smoker     Last attempt to quit: 1969     Years since quittin.9     Smokeless tobacco: Never Used   Substance and Sexual Activity     Alcohol use: Yes     Alcohol/week: 1.0 standard drinks     Types: 1 Cans of beer per week     Comment: occassional     Drug use: No     Sexual activity: Yes   Lifestyle     Physical activity:     Days per week: Not on file     Minutes per session: Not on file     Stress: Not on file   Relationships     Social connections:     Talks on phone: Not on file     Gets together: Not on file     Attends Sikhism service: Not on file     Active member of club or organization: Not on file     Attends meetings of clubs or organizations: Not on file     Relationship status: Not on file     Intimate partner violence:     Fear of current or ex partner: Not on file     Emotionally abused: Not on file     Physically abused: Not on file     Forced sexual activity: Not on file   Other Topics Concern     Not on file   Social History Narrative     Not on file      Current Outpatient Medications   Medication Sig Dispense Refill     atorvastatin (LIPITOR) 40 MG tablet TAKE 1 TABLET DAILY 90 tablet 2     losartan (COZAAR) 50 MG tablet TAKE 1 TABLET  "DAILY 90 tablet 2     multivitamin therapeutic (THERAGRAN) tablet Take 1 tablet by mouth daily.       naproxen sodium (ALEVE) 220 MG tablet Take 220 mg by mouth every 12 (twelve) hours as needed.       omeprazole (PRILOSEC) 20 MG capsule TAKE 1 CAPSULE DAILY BEFORE BREAKFAST 90 capsule 2     sildenafil (VIAGRA) 50 MG tablet Take 50 mg by mouth. One hour prior to sexual activity.       blood glucose control, high Soln Use as directed 1 each 1     blood glucose test strips Use to test blood sugar once per day. One Touch Verio test strips or strips covered by insurance 100 strip 4     lancets (ONETOUCH DELICA LANCETS) 30 gauge Misc Use to test blood sugar once per day. 100 each 4     No current facility-administered medications for this visit.       Objective:   Vital Signs:   Visit Vitals  /72   Pulse 60   Temp 98.2  F (36.8  C) (Oral)   Ht 6' 2\" (1.88 m)   Wt (!) 224 lb (101.6 kg)   SpO2 95%   BMI 28.76 kg/m         VisionScreening:  No exam data present     PHYSICAL EXAM  Alert male.  Head normocephalic.  External ears and TMs normal.  Eyes show pupils equal round and react to light accommodation.  Nose and oropharynx show no acute change.  Neck supple without adenopathy or thyromegaly.  Lungs clear to auscultation.  Heart regular rate and rhythm without murmur.  Abdomen shows no masses tenderness or hepatosplenomegaly.  Genitalia show normal testes, no hernia.  Rectal examination unremarkable smooth prostate.  Extremities show no edema, good peripheral pulses.  Incision from dermatologist excision in the right medial calf area is healing well with some residual sutures which she will be getting removed in 2 days at dermatology office.  Skin shows multiple seborrheic keratoses.  He is alert with clear speech.  He has normal monofilament sensation in both feet.    Assessment Results 11/25/2019   Activities of Daily Living No help needed   Instrumental Activities of Daily Living No help needed   Mini Cog Total " Score 4   Some recent data might be hidden     A Mini-Cog score of 0-2 suggests the possibility of dementia, score of 3-5 suggests no dementia    Identified Health Risks:     The patient was counseled and encouraged to consider modifying their diet and eating habits. He was provided with information on recommended healthy diet options.  Patient's advanced directive was discussed and I am comfortable with the patient's wishes.

## 2021-06-04 VITALS
OXYGEN SATURATION: 95 % | HEART RATE: 93 BPM | RESPIRATION RATE: 16 BRPM | WEIGHT: 225.25 LBS | SYSTOLIC BLOOD PRESSURE: 138 MMHG | DIASTOLIC BLOOD PRESSURE: 80 MMHG | BODY MASS INDEX: 28.92 KG/M2 | TEMPERATURE: 97.7 F

## 2021-06-04 VITALS
DIASTOLIC BLOOD PRESSURE: 72 MMHG | OXYGEN SATURATION: 95 % | TEMPERATURE: 98.2 F | WEIGHT: 224 LBS | HEIGHT: 74 IN | BODY MASS INDEX: 28.75 KG/M2 | SYSTOLIC BLOOD PRESSURE: 120 MMHG | HEART RATE: 60 BPM

## 2021-06-04 NOTE — PROGRESS NOTES
Chief Complaint   Patient presents with     Mass     Pt c/o R side mass R side abdomen noticed on Tuesday       HPI: Very pleasant 74-year-old male with a history of non-insulin-dependent diabetes presents today with a right-sided abdominal mass that he noticed on Tuesday while changing his shirt.  He is recently lost some weight.  This has never been there in the past to his recollection.  Is nonpainful.    ROS: No bowel or bladder issues.    SH:    reports that he quit smoking about 51 years ago. He has never used smokeless tobacco. He reports current alcohol use of about 1.0 standard drinks of alcohol per week. He reports that he does not use drugs.      FH: The Patient's family history includes Prostate cancer (age of onset: 71) in his father.     Meds:  Ruy has a current medication list which includes the following prescription(s): atorvastatin, blood glucose control, high, blood glucose test, lancets, losartan, multivitamin therapeutic, naproxen sodium, omeprazole, and sildenafil.    O:  /80   Pulse 93   Temp 97.7  F (36.5  C)   Resp 16   Wt (!) 225 lb 4 oz (102.2 kg)   SpO2 95%   BMI 28.92 kg/m    Alert conversant no distress  Examination the right thorax shows a 5 cm round rubbery easily movable mass just below the skin in the right mid abdominal region.  Is freely movable nonpainful    A/P:   1. Lipoma of skin and subcutaneous tissue  Reassurance.  Offered excision patient will consider.    2.  NIDDM  -Stable  -Continue current medicine

## 2021-06-05 VITALS
OXYGEN SATURATION: 96 % | WEIGHT: 225 LBS | DIASTOLIC BLOOD PRESSURE: 82 MMHG | SYSTOLIC BLOOD PRESSURE: 120 MMHG | BODY MASS INDEX: 28.88 KG/M2 | HEART RATE: 58 BPM | HEIGHT: 74 IN

## 2021-06-05 VITALS
BODY MASS INDEX: 27.34 KG/M2 | HEIGHT: 74 IN | WEIGHT: 213 LBS | SYSTOLIC BLOOD PRESSURE: 120 MMHG | DIASTOLIC BLOOD PRESSURE: 64 MMHG | OXYGEN SATURATION: 98 % | RESPIRATION RATE: 16 BRPM | HEART RATE: 57 BPM

## 2021-06-07 NOTE — TELEPHONE ENCOUNTER
Pt notified that he is due for med check appt. Offered to sched an appt but pt will have to call back

## 2021-06-07 NOTE — TELEPHONE ENCOUNTER
----- Message from Vidya Carcamo CMA sent at 5/2/2020 10:16 AM CDT -----  Regarding: due for appt  Pt is due for dm check last done in 11/19 due back in 4 months

## 2021-06-08 NOTE — PROGRESS NOTES
"Ruy Gibbs is a 74 y.o. male who is being evaluated via a billable telephone visit.      The patient has been notified of following:     \"This telephone visit will be conducted via a call between you and your physician/provider. We have found that certain health care needs can be provided without the need for a physical exam.  This service lets us provide the care you need with a short phone conversation.  If a prescription is necessary we can send it directly to your pharmacy.  If lab work is needed we can place an order for that and you can then stop by our lab to have the test done at a later time.    Telephone visits are billed at different rates depending on your insurance coverage. During this emergency period, for some insurers they may be billed the same as an in-person visit.  Please reach out to your insurance provider with any questions.    If during the course of the call the physician/provider feels a telephone visit is not appropriate, you will not be charged for this service.\"    Patient has given verbal consent to a Telephone visit? Yes    What phone number would you like to be contacted at? [phone]    Patient would like to receive their AVS by AVS Preference: [place].    Additional provider notes: 74-year-old male needing follow-up on the issues below.  This is done as a phone visit because a video visit was not possible either through an well or do oximetry at this time.  The COVID-19 pandemic has required altering routine visits.  Last visit in November.  See that note.  Regarding diabetes he is only checking occasional blood sugars but states that they are good when he test them.  He notes that he is exercising more than an hour most days.  He notes that his weight is down to 220 pounds.  Regarding hypertension no headaches or dizziness or leg swelling.  Regarding lipids no diarrhea constipation muscle ache or muscle weakness.  Regarding reflux he is not having any heartburn indigestion " etc.  He is not using the sildenafil very often now but does request to have it be left on his list of medications.  Patient Active Problem List   Diagnosis     Male Erectile Disorder     Hyperlipidemia     Chronic Reflux Esophagitis     Diabetes mellitus type 2, noninsulin dependent (H)     Benign essential hypertension     Past Medical History:   Diagnosis Date     Benign essential hypertension 12/12/2018     Diabetes mellitus type 2, noninsulin dependent (H) 2/7/2018     Hyperlipemia      Reflux esophagitis      No Known Allergies  Current Outpatient Medications   Medication Sig Dispense Refill     atorvastatin (LIPITOR) 40 MG tablet TAKE 1 TABLET DAILY 90 tablet 2     losartan (COZAAR) 50 MG tablet TAKE 1 TABLET DAILY 90 tablet 2     multivitamin therapeutic (THERAGRAN) tablet Take 1 tablet by mouth daily.       naproxen sodium (ALEVE) 220 MG tablet Take 220 mg by mouth every 12 (twelve) hours as needed.       omeprazole (PRILOSEC) 20 MG capsule TAKE 1 CAPSULE DAILY BEFORE BREAKFAST 90 capsule 2     sildenafil (VIAGRA) 50 MG tablet Take 50 mg by mouth. One hour prior to sexual activity.       blood glucose control, high Soln Use as directed 1 each 1     blood glucose test strips Use to test blood sugar once per day. One Touch Verio test strips or strips covered by insurance 100 strip 4     lancets (ONETOUCH DELICA LANCETS) 30 gauge Misc Use to test blood sugar once per day. 100 each 4     No current facility-administered medications for this visit.      He does not smoke.  Little alcohol.  All other review of systems are negative.    Assessment/Plan:  1. Diabetes mellitus type 2, noninsulin dependent (H)  Glycosylated Hemoglobin A1c    Basic Metabolic Panel   2. Benign essential hypertension  Basic Metabolic Panel   3. Mixed hyperlipidemia  Lipid Cascade    Hepatic Profile   4. Chronic Reflux Esophagitis  Hemoglobin   5. Male Erectile Disorder       He will schedule visit in the next several weeks for fasting  lab.  If those labs are fine then he will return in September or October for next med check visit for his diabetes.  I explained that I was retiring in 3 months and discussed options and he plans to have Dr. Armijo be his primary care after I leave then.  He will call for refills on meds as needed.  He will keep up his excellent exercising careful diet etc.      Phone call duration:  12 minutes    Taiwo García MD

## 2021-06-09 NOTE — PROGRESS NOTES
HPI: This patient is a 76yo M who presents back to clinic for evaluation of nasal congestion. He was treated about a year ago for right-sided epistaxis. He has had no issues with that since, but states that it he has congestion every day. He is using nasal saline every day, and gets relief of the congestion from using it. However, he feels like to use it again about 6hrs later and feels that is not normal.      Past medical history, surgical history, social history, family history, medications, and allergies have been reviewed with the patient and are documented above.     Review of Systems: a 10-system review was performed. Pertinent positives are noted in the HPI and on a separate scanned document in the chart.     PHYSICAL EXAMINATION:  GEN: no acute distress, normocephalic  EYES: extraocular movements are intact, pupils are equal and round. Sclera clear.   NOSE: anterior nares are patent. There are no masses or lesions. The septum is non-obstructing. Tissues mildly dry.   NEURO: CN VII and XII symmetric. alert and oriented. No spontaneous nystagmus. Gait is normal.  PULM: breathing comfortably on room air, normal chest expansion with respiration     MEDICAL DECISION-MAKING: This patient is a 76yo M with nasal congestion from mucosal dryness, which is a continuation of the same issue that led to his epistaxis a year ago. Advised on continued nasal saline. RTC PRN.

## 2021-06-12 NOTE — PROGRESS NOTES
Preoperative Exam    Scheduled Procedure: Carpal Tunnel, R hand  Surgery Date:  10/26  Surgery Location: [facility], [phone]    Surgeon:  Dr. Rosales    Very pleasant 75-year-old male with past history of NIDDM, HTN, and hyperlipidemia, presents for preop evaluation for carpal tunnel surgery at [facility] on 10/26/2020.  The patient has had past surgery including gallbladder surgery with no anesthesia complications.    Family history is negative for malignant hyperthermia, he does not take blood thinners, he has no EDITH, he does not have dentures or contact lenses, and is a Mallampati class III.    Assessment/Plan:     1. Routine adult health maintenance  Patient is been optimized for surgery  - Influenza,Quad,High Dose,PF 65 YR+  - HM2(CBC w/o Differential)  - Basic Metabolic Panel  - Glycosylated Hemoglobin A1c    2. Diabetes mellitus type 2, noninsulin dependent (H)  Stable.  His last A1c was 6.2.    3. Benign essential hypertension  Stable.    Surgical Procedure Risk: Low (reported cardiac risk generally < 1%)  Have you had prior anesthesia?: Yes  Have you or any family members had a previous anesthesia reaction:  No  Do you or any family members have a history of a clotting or bleeding disorder?: No  Cardiac Risk Assessment: no increased risk for major cardiac complications    Pt has been optimized for surgery      Functional Status: Independent  Patient plans to recover at home with family.     Subjective:      Ruy Gibbs is a 75 y.o. male who presents for a preoperative consultation.      All other systems reviewed and are negative, other than those listed in the HPI.    Pertinent History  Do you have difficulty breathing or chest pain after walking up a flight of stairs: No  History of obstructive sleep apnea: No  Steroid use in the last 6 months: No  Frequent Aspirin/NSAID use: No  Prior Blood Transfusion: No  Prior Blood Transfusion Reaction: No    Current Outpatient Medications   Medication  Sig Dispense Refill     atorvastatin (LIPITOR) 40 MG tablet TAKE 1 TABLET DAILY 90 tablet 1     blood glucose control, high Soln Use as directed 1 each 1     blood glucose test strips Use to test blood sugar once per day. One Touch Verio test strips or strips covered by insurance 100 strip 4     lancets (ONETOUCH DELICA LANCETS) 30 gauge Misc Use to test blood sugar once per day. 100 each 4     losartan (COZAAR) 50 MG tablet TAKE 1 TABLET DAILY 90 tablet 1     multivitamin therapeutic (THERAGRAN) tablet Take 1 tablet by mouth daily.       naproxen sodium (ALEVE) 220 MG tablet Take 220 mg by mouth every 12 (twelve) hours as needed.       omeprazole (PRILOSEC) 20 MG capsule TAKE 1 CAPSULE DAILY BEFORE BREAKFAST 90 capsule 1     sildenafil (VIAGRA) 50 MG tablet Take 50 mg by mouth. One hour prior to sexual activity.       No current facility-administered medications for this visit.         No Known Allergies    Patient Active Problem List   Diagnosis     Male Erectile Disorder     Hyperlipidemia     Chronic Reflux Esophagitis     Diabetes mellitus type 2, noninsulin dependent (H)     Benign essential hypertension       Past Medical History:   Diagnosis Date     Benign essential hypertension 12/12/2018     Diabetes mellitus type 2, noninsulin dependent (H) 2/7/2018     Hyperlipemia      Reflux esophagitis        Past Surgical History:   Procedure Laterality Date     CHOLECYSTECTOMY  Jan 2014    dr. Way at [facility]     INGUINAL HERNIA REPAIR Left 4/13/2016    Procedure: LEFT INGUINAL HERNIA REPAIR;  Surgeon: Drew Way MD;  Location: [facility];  Service:      SC REMOVAL OF TONSILS,<13 Y/O      Description: Tonsillectomy;  Recorded: 08/04/2008;     strabismus repair Bilateral 7/11/2003    [facility], also right inferior oblique myomectomy at same time     TONSILLECTOMY         Social History     Socioeconomic History     Marital status:      Spouse name: Not on file     Number  "of children: Not on file     Years of education: Not on file     Highest education level: Not on file   Occupational History     Not on file   Social Needs     Financial resource strain: Not on file     Food insecurity     Worry: Not on file     Inability: Not on file     Transportation needs     Medical: Not on file     Non-medical: Not on file   Tobacco Use     Smoking status: Former Smoker     Quit date: 1969     Years since quittin.8     Smokeless tobacco: Never Used   Substance and Sexual Activity     Alcohol use: Yes     Alcohol/week: 1.0 standard drinks     Types: 1 Cans of beer per week     Comment: occassional     Drug use: No     Sexual activity: Yes   Lifestyle     Physical activity     Days per week: Not on file     Minutes per session: Not on file     Stress: Not on file   Relationships     Social connections     Talks on phone: Not on file     Gets together: Not on file     Attends Hindu service: Not on file     Active member of club or organization: Not on file     Attends meetings of clubs or organizations: Not on file     Relationship status: Not on file     Intimate partner violence     Fear of current or ex partner: Not on file     Emotionally abused: Not on file     Physically abused: Not on file     Forced sexual activity: Not on file   Other Topics Concern     Not on file   Social History Narrative     Not on file       ROS:  10 pt system review complete.  Notable for erectile dysfunction and GERD    Patient Care Team:  Samantha Armijo MD as PCP - General (Family Medicine)  Samantha Armijo MD as Hospitalist (Family Medicine)  Samantha Armijo MD as Assigned PCP          Objective:     Vitals:    10/07/20 1036   BP: 120/64   Pulse: (!) 57   Resp: 16   SpO2: 98%   Weight: 213 lb (96.6 kg)   Height: 6' 2\" (1.88 m)         Physical Exam:  Constitutional:    --Vitals as above  --No acute distress  Eyes-  --Sclera noninjected  --Lids and conjunctiva normal  ENT-  --TMs " clear  --Sclera noninjected  --Pharynx not erythematous  Neck-  --Neck supple with no cervical lymphadenopathy  Lungs-  --Clear to Auscultation  Heart-  --Regular rate and rhythm  Abdomen--  --Soft, non-tender, non-distended  Skin-  --Pink and dry  Psych-  --Alert and oriented  --Normal affect      There are no Patient Instructions on file for this visit.        Labs:  Labs pending at this time.  Results will be reviewed when available.    Immunization History   Administered Date(s) Administered     DT (pediatric) 09/20/1995, 06/20/2005     Hep A, historic 08/04/2008, 10/15/2009     Influenza [id number], 01/05/2010     Influenza high dose,seasonal,PF, 65+ yrs 10/19/2015, 11/16/2016, 11/21/2017, 10/27/2018, 11/25/2019     Influenza, inj, historic,unspecified 11/13/2007, 11/16/2007, 10/28/2008, 10/15/2014     Influenza, seasonal,quad inj 6-35 mos 10/05/2010, 10/17/2012, 12/26/2013     Pneumo Conj 13-V (2010&after) 12/30/2015     Pneumo Polysac 23-V 04/04/2011     Td,adult,historic,unspecified 06/20/2005, 05/29/2012     Tdap 05/29/2012     ZOSTER, LIVE 02/05/2008       Thank you for the opportunity to participate in the care for this patient.  If you have any concerns please do not hesitate to contact me at the [facility] at [phone].    Electronically signed by Samantha Armijo MD 10/07/20 10:38 AM

## 2021-06-14 NOTE — TELEPHONE ENCOUNTER
Called pt to discuss covid vaccine per his [employer] message. He tried to sched appt online but was unable to confirm an appt. As of right now online there are no more availabilities. Pt will keep trying to schedule in the AMs.

## 2021-06-14 NOTE — TELEPHONE ENCOUNTER
Refill Approved    Rx renewed per Medication Renewal Policy. Medication was last renewed on 7/17/20.    Teresa Ruffin, [facility] Triage/Med Refill 1/13/2021     Requested Prescriptions   Pending Prescriptions Disp Refills     losartan (COZAAR) 50 MG tablet [Pharmacy Med Name: LOSARTAN TABS 50MG] 90 tablet 3     Sig: TAKE 1 TABLET DAILY       Angiotensin Receptor Blocker Protocol Passed - 1/13/2021 12:20 AM        Passed - PCP or prescribing provider visit in past 12 months       Last office visit with prescriber/PCP: 4/22/2019 Taiwo García MD OR same dept: Visit date not found OR same specialty: 12/26/2019 Samantha Armijo MD  Last physical: 11/25/2019 Last MTM visit: Visit date not found   Next visit within 3 mo: Visit date not found  Next physical within 3 mo: Visit date not found  Prescriber OR PCP: Taiwo García MD  Last diagnosis associated with med order: 1. Benign essential hypertension  - losartan (COZAAR) 50 MG tablet [Pharmacy Med Name: LOSARTAN TABS 50MG]; TAKE 1 TABLET DAILY  Dispense: 90 tablet; Refill: 3    2. Mixed hyperlipidemia  - atorvastatin (LIPITOR) 40 MG tablet [Pharmacy Med Name: ATORVASTATIN TABS 40MG]; TAKE 1 TABLET DAILY  Dispense: 90 tablet; Refill: 3    3. Chronic Reflux Esophagitis  - omeprazole (PRILOSEC) 20 MG capsule [Pharmacy Med Name: OMEPRAZOLE DR CAPS 20MG]; TAKE 1 CAPSULE DAILY BEFORE BREAKFAST  Dispense: 90 capsule; Refill: 3    If protocol passes may refill for 12 months if within 3 months of last provider visit (or a total of 15 months).             Passed - Serum potassium within the past 12 months     Lab Results   Component Value Date    Potassium 4.2 10/07/2020             Passed - Blood pressure filed in past 12 months     BP Readings from Last 1 Encounters:   10/07/20 120/64             Passed - Serum creatinine within the past 12 months     Creatinine   Date Value Ref Range Status   10/07/2020 0.78 0.70 - 1.30 mg/dL Final                atorvastatin  (LIPITOR) 40 MG tablet [Pharmacy Med Name: ATORVASTATIN TABS 40MG] 90 tablet 3     Sig: TAKE 1 TABLET DAILY       Statins Refill Protocol (Hmg CoA Reductase Inhibitors) Passed - 1/13/2021 12:20 AM        Passed - PCP or prescribing provider visit in past 12 months      Last office visit with prescriber/PCP: 4/22/2019 Taiwo García MD OR same dept: Visit date not found OR same specialty: 12/26/2019 Samantha Armijo MD  Last physical: 11/25/2019 Last MTM visit: Visit date not found   Next visit within 3 mo: Visit date not found  Next physical within 3 mo: Visit date not found  Prescriber OR PCP: Taiwo García MD  Last diagnosis associated with med order: 1. Benign essential hypertension  - losartan (COZAAR) 50 MG tablet [Pharmacy Med Name: LOSARTAN TABS 50MG]; TAKE 1 TABLET DAILY  Dispense: 90 tablet; Refill: 3    2. Mixed hyperlipidemia  - atorvastatin (LIPITOR) 40 MG tablet [Pharmacy Med Name: ATORVASTATIN TABS 40MG]; TAKE 1 TABLET DAILY  Dispense: 90 tablet; Refill: 3    3. Chronic Reflux Esophagitis  - omeprazole (PRILOSEC) 20 MG capsule [Pharmacy Med Name: OMEPRAZOLE DR CAPS 20MG]; TAKE 1 CAPSULE DAILY BEFORE BREAKFAST  Dispense: 90 capsule; Refill: 3    If protocol passes may refill for 12 months if within 3 months of last provider visit (or a total of 15 months).                omeprazole (PRILOSEC) 20 MG capsule [Pharmacy Med Name: OMEPRAZOLE DR CAPS 20MG] 90 capsule 3     Sig: TAKE 1 CAPSULE DAILY BEFORE BREAKFAST       GI Medications Refill Protocol Passed - 1/13/2021 12:20 AM        Passed - PCP or prescribing provider visit in last 12 or next 3 months.     Last office visit with prescriber/PCP: 4/22/2019 Taiwo García MD OR same dept: Visit date not found OR same specialty: 12/26/2019 Samantha Armijo MD  Last physical: 11/25/2019 Last MTM visit: Visit date not found   Next visit within 3 mo: Visit date not found  Next physical within 3 mo: Visit date not found  Prescriber OR PCP:  Taiwo García MD  Last diagnosis associated with med order: 1. Benign essential hypertension  - losartan (COZAAR) 50 MG tablet [Pharmacy Med Name: LOSARTAN TABS 50MG]; TAKE 1 TABLET DAILY  Dispense: 90 tablet; Refill: 3    2. Mixed hyperlipidemia  - atorvastatin (LIPITOR) 40 MG tablet [Pharmacy Med Name: ATORVASTATIN TABS 40MG]; TAKE 1 TABLET DAILY  Dispense: 90 tablet; Refill: 3    3. Chronic Reflux Esophagitis  - omeprazole (PRILOSEC) 20 MG capsule [Pharmacy Med Name: OMEPRAZOLE DR CAPS 20MG]; TAKE 1 CAPSULE DAILY BEFORE BREAKFAST  Dispense: 90 capsule; Refill: 3    If protocol passes may refill for 12 months if within 3 months of last provider visit (or a total of 15 months).

## 2021-06-15 NOTE — PROGRESS NOTES
Preoperative Exam    Scheduled Procedure: Phaco w/ IOL- both eyes  Surgery Date: Right eye 2/20/18 & Left eye 3/6/18  Surgery Location: [facility], [place], fax [phone]    Surgeon:  Dr. Andrés Reno    Assessment/Plan:     1. Pre-op examination  Electrocardiogram Perform - Clinic    Basic Metabolic Panel    CANCELED: Electrocardiogram Perform - Clinic   2. Cataracts, bilateral  Basic Metabolic Panel    CANCELED: Electrocardiogram Perform - Clinic   3. Hyperlipidemia  Hepatic Profile    CANCELED: Electrocardiogram Perform - Clinic   4. Chronic Reflux Esophagitis  Basic Metabolic Panel    Hemoglobin    CANCELED: Electrocardiogram Perform - Clinic   5. Prediabetes  Glycosylated Hemoglobin A1c           Surgical Procedure Risk: Low (reported cardiac risk generally < 1%)  Have you had prior anesthesia?: Yes  Have you or any family members had a previous anesthesia reaction:  No  Do you or any family members have a history of a clotting or bleeding disorder?: No  Cardiac Risk Assessment: no increased risk for major cardiac complications    Patient approved for surgery with general or local anesthesia.   For this procedure it is okay for him to stay on the aspirin.  Due to history of prediabetes, will check A1c here as it has been over one year since last check.  Continue current cholesterol management medication.  He can obtain fasting lipid profile within the next month when convenient for him.  Please Note:  No special considerations.    Functional Status: Independent  Patient plans to recover at home with family.     Subjective:      Ruy Gibbs is a 72 y.o. male who presents for a preoperative consultation.  He is having bilateral sequential cataract surgery.  Cataracts present recently. Was not able to be corrected for driving given the cataracts. Some difficulty with night vision- does not like to drive then.  Does see glare and has trouble seeing scoreboard at ice arena.     All other systems  reviewed and are negative, other than those listed in the HPI.    Pertinent History  Do you have difficulty breathing or chest pain after walking up a flight of stairs: No  History of obstructive sleep apnea: No  Steroid use in the last 6 months: No  Frequent Aspirin/NSAID use: Yes: takes aspirin daily  Prior Blood Transfusion: No  Prior Blood Transfusion Reaction: No  If for some reason prior to, during or after the procedure, if it is medically indicated, would you be willing to have a blood transfusion?:  There is no transfusion refusal.    Current Outpatient Prescriptions   Medication Sig Dispense Refill     aspirin 81 MG EC tablet Take 81 mg by mouth daily.       multivitamin therapeutic (THERAGRAN) tablet Take 1 tablet by mouth daily.       naproxen sodium (ALEVE) 220 MG tablet Take 220 mg by mouth every 12 (twelve) hours as needed.       omeprazole (PRILOSEC) 20 MG capsule TAKE 1 CAPSULE DAILY 90 capsule 3     simvastatin (ZOCOR) 20 MG tablet TAKE 1 TABLET AT BEDTIME 90 tablet 3     sildenafil (VIAGRA) 50 MG tablet Take 50 mg by mouth. One hour prior to sexual activity.       No current facility-administered medications for this visit.         No Known Allergies    Patient Active Problem List   Diagnosis     Male Erectile Disorder     Hyperlipidemia     Chronic Reflux Esophagitis     Prediabetes       Past Medical History:   Diagnosis Date     Hyperlipemia      Reflux esophagitis        Past Surgical History:   Procedure Laterality Date     CHOLECYSTECTOMY  Jan 2014    dr. Way at [facility]     INGUINAL HERNIA REPAIR Left 4/13/2016    Procedure: LEFT INGUINAL HERNIA REPAIR;  Surgeon: Drew Way MD;  Location: [facility];  Service:      AL REMOVAL OF TONSILS,<13 Y/O      Description: Tonsillectomy;  Recorded: 08/04/2008;     strabismus repair Bilateral 7/11/2003    [facility], also right inferior oblique myomectomy at same time     TONSILLECTOMY         Social History  "    Social History     Marital status:      Spouse name: N/A     Number of children: N/A     Years of education: N/A     Occupational History     Not on file.     Social History Main Topics     Smoking status: Former Smoker     Quit date: 1/1/1969     Smokeless tobacco: Never Used     Alcohol use 0.6 oz/week     1 Cans of beer per week      Comment: occassional     Drug use: No     Sexual activity: Yes     Other Topics Concern     Not on file     Social History Narrative       Patient Care Team:  Taiwo García MD as PCP - General          Objective:     Vitals:    02/06/18 1306 02/06/18 1315   BP: 144/90 138/80   Pulse: (!) 54    Resp: 16    Temp: 98  F (36.7  C)    TempSrc: Oral    SpO2: 94%    Weight: (!) 244 lb (110.7 kg)    Height: 6' 3\" (1.905 m)          Physical Exam:  Alert male.  Head normocephalic.  External ears and TMs normal, does have ceruminosis bilateral.  Bilateral eyes show significant cataracts, pupils equal round and reactive to light.  Nose and oropharynx are not remarkable.  Upper without adenopathy or thyromegaly.  Lungs are clear to auscultation.  Heart regular rate and rhythm without murmur.  Abdomen shows no masses tenderness or hepatosplenomegaly.  Genitalia normal with normal testes.  Rectal examination shows mild smooth enlargement of prostate with no focal nodules.  No pedal edema.  No focal neurologic abnormalities.  Independent ambulation.  Is alert with clear speech.    EKG shows sinus bradycardia, looks very similar to April 7 of 2016.      There are no Patient Instructions on file for this visit.        Labs:  Labs pending at this time.  Results will be reviewed when available.    Immunization History   Administered Date(s) Administered     DT (pediatric) 09/20/1995, 06/20/2005     Hep A, historic 08/04/2008, 10/15/2009     Influenza A3f3-51, 01/05/2010     Influenza high dose, seasonal 10/19/2015, 11/16/2016, 11/21/2017     Influenza, inj, historic,unspecified " 11/13/2007, 11/16/2007, 10/28/2008, 10/15/2014     Influenza, seasonal,quad inj 6-35 mos 10/05/2010, 10/17/2012, 12/26/2013     Pneumo Conj 13-V (2010&after) 12/30/2015     Pneumo Polysac 23-V 04/04/2011     Td,adult,historic,unspecified 06/20/2005, 05/29/2012     Tdap 05/29/2012     ZOSTER 02/05/2008           Electronically signed by Taiwo García MD 02/06/18 1:04 PM

## 2021-06-16 PROBLEM — E11.9 DIABETES MELLITUS TYPE 2, NONINSULIN DEPENDENT (H): Status: ACTIVE | Noted: 2018-02-07

## 2021-06-16 PROBLEM — I10 BENIGN ESSENTIAL HYPERTENSION: Status: ACTIVE | Noted: 2018-12-12

## 2021-06-16 NOTE — PROGRESS NOTES
Assessment:  1.  Non-insulin-dependent diabetes mellitus, type II.  2.  Hyperlipidemia.  3.  Chronic reflux esophagitis, controlled.    Plan: Check microalbumin.  If elevated explained we would start either ACE inhibitor or ARB.  If not we will just observe that yearly.  Recommend that because of his diagnosis of diabetes, that we stop the simvastatin and that we switch to atorvastatin at the 40 mg dose-#90-refill ×1-1 p.o. every afternoon-with the goal of getting his LDL lower and possibly slightly raising the HDL.  See previous lipids.  Renewed prescription for omeprazole 20 mg-#90-refill ×1.  And recommend that he see ophthalmologist once a year and he did have exam by Dr. Reno prior to his cataract surgery and asked him to see if he could get a copy of report for us.  Reassured him that his monofilament testing was normal in his feet when that was done.  Explained the concept of diabetes that he has type II etc.  Explained the idea of the strategy with the goal of reducing the risk of complications by managing diabetes well, managing cholesterol and managing blood pressure, regular exercise, diabetic diet etc.  Recommend that he see diabetic nurse educator for further instruction regarding diet as well as learning how to monitor blood sugar.  At this point for learning he can initially check blood sugar daily and then it will depend on his results given that he had only a mildly elevated A1c at 6.6%.  Spent in excess of 25 minutes with least 50% in counseling regarding this new diagnosis of the diabetes found on the A1c at the February visit.    Subjective: 72-year-old male presenting for follow-up regarding the diagnosis of diabetes by the A1c done on the blood test of February 6.  He did have his second cataract surgery done about March 6 or so.  He feels that is gone well.  He is feeling fine and does not have any new symptoms.  Past Medical History:   Diagnosis Date     Diabetes mellitus type 2, noninsulin  dependent 2/7/2018     Hyperlipemia      Reflux esophagitis      No Known Allergies  He takes the simvastatin 20 mg every afternoon and the omeprazole 20 mg daily.  He takes aspirin 81 mg per day.    Objective:/80  Pulse 60  Wt (!) 241 lb (109.3 kg)  BMI 30.12 kg/m2  Bilateral foot exam shows normal dorsalis pedis pulses 2+, and has monofilament sensation in all areas tested of both feet.

## 2021-06-16 NOTE — PROGRESS NOTES
S:  75-year-old male with a history of NIDDM, hypertension, GERD presents for evaluation of his chronic diseases.  His blood sugars have not been checked.  He has gained some weight due to COVID-19 restrictions.  His GERD remained stable on PPI.  His blood pressure remained stable on Cozaar.    Medications: Lipitor, losartan, Aleve, Prilosec and Viagra    O:   Blood pressure 120/82, pulse 58, respiration 12, weight 225  Constitutional:    --Vitals as above  --No acute distress  Eyes-  --Sclera noninjected  --Lids and conjunctiva normal  ENT-  --TMs clear  --Sclera noninjected  --Pharynx not erythematous  Neck-  --Neck supple with no cervical lymphadenopathy  Lungs-  --Clear to Auscultation  Heart-  --Regular rate and rhythm  Abdomen--  --Soft, non-tender, non-distended  Skin-  --Pink and dry  Psych-  --Alert and oriented  --Normal affect      A:   GERD  NIDDM  Hyperlipidemia  Hypertension    P:   Continue losartan for hypertension  Continue atorvastatin for GERD  Encourage exercise and weight loss for NIDDM  Continue PPI  CBC BMP lipid PSA A1c  RTC 6 months

## 2021-06-17 NOTE — PROGRESS NOTES
DIABETES CARE PLAN    Assessment/Plan:     Education Assessment: Follow-up visit for diabetes education and counseling. Ruy is testing his blood sugar once per day. The 30 day blood sugar average is 132. He is making healthier food choices and eating less. Noted he lost 4 pounds the past month. Patient wears a fit bit tracker and averages between 5,000-8,000 steps per day. He is golfing weekly and doing yard work. Instructed on preventing diabetes complications (tests and exams, foot care, sick days).       Plan:  1. Continue to test blood sugar once per day at different times.  2. Continue to limit carbohydrates and avoid sugar soda.  3. Stay active every day. Try not to sit for more than 30 minutes. Aim for 6,000-8,000 steps per day.  4. Next diabetes check with Dr. García due around 8/6/18.  5. Future visits with me as needed. Medicare allows 10 more hours of diabetes education until 4/13/19.    Objective/Subjective:     Ruy Gibbs is a 72 y.o. male referred by Taiwo aGrcía MD. Accompanied by:unaccompanied    Diabetes Medications: none    BG goals: Before meals ; 2 hours after meals: less 180; Bedtime: 120-140    Lab Results   Component Value Date    HGBA1C 6.6 (H) 02/06/2018     Monitoring   Meter: Discussed, Literature provided, Patient returned demonstration and Competent  Monitoring: Discussed, Literature provided and Competent  BG goals: Discussed and Literature provided and Competent    Nutrition Management  Nutrition Management: Discussed and Competent  Weight: Discussed and Competent  Portions/Balance: Discussed and Competent  Carb ID/Count: Assessed, Discussed, Literature provided and Competent  Label Reading: Assessed, Discussed Literature provided and Competent  Heart Healthy Fats: Competent  Low sodium diet:  Physical Activity: Assessed and Competent  Medications: Competent    Diabetes Disease Process: Competent    Stress Management: Discussed, Literature provided and  Competent    Acute Complications: Prevent, Detect, Treat:  Hypoglycemia: Assessed, Discussed and Competent  Hyperglycemia: Assessed, Discussed and Competent  Sick Days: Discussed, Literature provided and Competent    Chronic Complications  Foot Care: Discussed, Literature provided and Competent  Skin Care: Discussed, Literature provided and Competent  Eye: Discussed, Literature provided and Competent.  ABC: Discussed, Literature provided and Competent  Teeth: Discussed, Literature provided and Competent  Goal Setting and Problem Solving: Assessed, Discussed and Competent  Barriers: Assessed  Psychosocial Adjustments: Assessed    Time spent with the patient: 60 minutes  Visit Type: Medical Nutrition Therapy, Re-assessment ([id number])  Diagnosis per referral: Type 2 Diabetes, controlled E11.9      Dariela Mahmood, IRMA, LD, CDE  5/11/2018  12:08 PM

## 2021-06-17 NOTE — PROGRESS NOTES
"DIABETES CARE PLAN    Assessment/Plan:     Initial visit for newly diagnosed Type 2 diabetes. Instructed on what is diabetes and how to control it. Reviewed symptoms and treatment of high and low blood sugar. Instructed on general diet guidelines, carbohydrate counting and portion control. Discussed the importance of weight control and daily activity. Demonstrated how to check blood sugar at home. Provided a One Touch Verio glucometer. Ruy has been reading a lot about diabetes and nutrition on the Internet. Provided a list of reliable web sites. He had many excellent questions today.      Diabetes Medications:none    Plan:  1. Test blood sugar 1 times per day at different times.  2. Eat smaller amounts more often. Avoid skipping meals. Aim for 60-75 grams of carbohydrate per meal; 15-30 grams of carbohydrate per snack.  3. Avoid sugary drinks (regular soda, lemonade, sweetened tea and Gatorade).  4. A \"free\" food is less than 20 calories and 5 grams of carbohydrate or less.   4. Eat fresh fruit instead of juice.  5. Include high fiber, whole grain foods instead of processed white foods. Healthier choices are whole grain cereals, whole wheat pasta, brown or wild rice and whole wheat bread.  6.. Stay active every day; try not to sit for more than 30 minutes and walk 20-30 minutes most days of the week.   7. Bring meter and blood sugar log to next visit in one month.    Blood sugar targets:   Before meals:   2 hours after meals: less 180  Bedtime: 120-140    A1c target of less than 7.0% (estimated average blood sugar of 154 on your meter)    Subjective/Objective:   Ruy Gibbs is a 72 y.o. male referred by Taiwo García MD  Accompanied by: unaccompanied   Lives with his wife. She does the cooking. Patient does the grocery shopping    B after lunch    Lab Results   Component Value Date    HGBA1C 6.6 (H) 2018     Monitoring   Meter: Discussed, Literature provided, Patient returned " demonstration and Competent  Monitoring: Discussed and Demonstrated how to use and practiced.  Control Solution: Demonstrated and Discussed  BG goals: Discussed and Literature provided    Nutrition Management  Nutrition Management: Discussed and Literature provided  Carbohydrate Counting/Plate Method: Discussed and Literature provided  Label Reading Guidelines: Discussed and Literature provided  Grocery shopping tips: Discussed and Literature provided  Healthy Snack Ideas: Discussed and Literature provided  Weight: Discussed and Literature provided  Portions/Balance: Discussed and demonstrated with food models    Physical Activity: Discussed and Literature provided  Medications: N/A    Diabetes Disease Process: Discussed and Literature provided  ABC: Discussed and Literature provided  A1c test and how it relates to meter numbers: Discussed and Literature provided    Acute Complications: Prevent, Detect, Treat:  Hypoglycemia: Discussed and Literature provided  Hyperglycemia: Discussed and Literature provided      Goal Setting and Problem Solving: Discussed and Literature provided  Barriers: Assessed  Psychosocial Adjustments: Assessed    Time spent with the patient: 120 minutes   Previous Education: No  Visit Type: Medical Nutrition Therapy, Initial (20662)  Hours Remaining: DSMT 10 hours and MNT 1 hour until 4/13/2019  Diagnosis per referral:Type 2 Diabetes, controlled E11.9    Dariela Mahmood RD, LD, CDE  4/13/2018  5:09 PM

## 2021-06-17 NOTE — PATIENT INSTRUCTIONS - HE
Patient Instructions by Taiwo García MD at 11/25/2019 10:40 AM     Author: Taiwo García MD Service: -- Author Type: Physician    Filed: 11/25/2019 10:58 AM Encounter Date: 11/25/2019 Status: Signed    : Taiwo García MD (Physician)         Patient Education   Understanding USDA MyPlate  The USDA (US Department of Agriculture) has guidelines to help you make healthy food choices. These are called MyPlate. MyPlate shows the food groups that make up healthy meals using the image of a place setting. Before you eat, think about the healthiest choices for what to put onto your plate or into your cup or bowl. To learn more about building a healthy plate, visit www.choosemyplate.gov.       The Food Groups    Fruits: Any fruit or 100% fruit juice counts as part of the Fruit Group. Fruits may be fresh, canned, frozen, or dried, and may be whole, cut-up, or pureed. Make half your plate fruits and vegetables.    Vegetables: Any vegetable or 100% vegetable juice counts as a member of the Vegetable Group. Vegetables may be fresh, frozen, canned, or dried. They can be served raw or cooked and may be whole, cut-up, or mashed. Make half your plate fruits and vegetables.     Grains: All foods made from grains are part of the Grains Group. These include wheat, rice, oats, cornmeal, and barley such as bread, pasta, oatmeal, cereal, tortillas, and grits. Grains should be no more than a quarter of your plate. At least half of your grains should be whole grains.    Protein: This group includes meat, poultry, seafood, beans and peas, eggs, processed soy products (like tofu), nuts (including nut butters), and seeds. Make protein choices no more than a quarter of your plate. Meat and poultry choices should be lean or low fat.    Dairy: All fluid milk products and foods made from milk that contain calcium, like yogurt and cheese are part of the Dairy Group. (Foods that have little calcium, such as cream, butter, and  cream cheese, are not part of the group.) Most dairy choices should be low-fat or fat-free.    Oils: These are fats that are liquid at room temperature. They include canola, corn, olive, soybean, and sunflower oil. Foods that are mainly oil include mayonnaise, certain salad dressings, and soft margarines. You should have only 5 to 7 teaspoons of oils a day. You probably already get this much from the food you eat.  Use [employer] to Help Build Your Meals  The [employer] can help you plan and track your meals and activity. You can look up individual foods to see or compare their nutritional value. You can get guidelines for what and how much you should eat. You can compare your food choices. And you can assess personal physical activities and see ways you can improve. Go to www.[employer] Prom[employer].gov/[employer]/.    6841-2231 [employer]. [address] [place] [address]. All rights reserved. This information is not intended as a substitute for professional medical care. Always follow your healthcare professional's instructions.             Advance Directive  Patients advance directive was discussed and I am comfortable with the patients wishes.  Patient Education   Personalized Prevention Plan  You are due for the preventive services outlined below.  Your care team is available to assist you in scheduling these services.  If you have already completed any of these items, please share that information with your care team to update in your medical record.  Health Maintenance   Topic Date Due   ? HEPATITIS C SCREENING  1945   ? ZOSTER VACCINES (2 of 3) 04/01/2008   ? MEDICARE ANNUAL WELLNESS VISIT  11/16/2017   ? COLONOSCOPY  09/10/2018   ? INFLUENZA VACCINE RULE BASED (1) 08/01/2019   ? A1C  10/22/2019   ? FALL RISK ASSESSMENT  12/12/2019   ? BMP  04/22/2020   ? DIABETIC FOOT EXAM  04/22/2020   ? LIPID  04/22/2020   ? MICROALBUMIN  04/22/2020   ? DIABETIC EYE EXAM  06/26/2020   ? ADVANCE  CARE PLANNING  11/16/2021   ? TD 18+ HE  05/29/2022   ? PNEUMOCOCCAL IMMUNIZATION 65+ LOW/MEDIUM RISK  Completed

## 2021-06-19 NOTE — PROGRESS NOTES
Assessment:  1.  Non-insulin-dependent diabetes mellitus, appearing to be likely controlled well.  2.  Hyperlipidemia, checking status, still on simvastatin 20 mg.  3.  Chronic reflux esophagitis controlled on current regimen.    Plan: Congratulated him on his excellent efforts regarding diet and weight reduction.  I agree with his goal of pursuing 200 pounds eventually.  Continue current diet and exercise etc.  He will go ahead and switch to the atorvastatin 40 mg as his lipids are likely not at goal and recommend he try taking that.  If any difficulties with that he will let us know.  Plan follow-up in 4 months for next diabetic recheck but earlier as needed.  Renew meds as needed in the meantime.  He understands and agrees.  Refill on his blood sugar test strips-#100-use 1 daily, refillable ×4.  His microalbumin was normal in early March.  I explained that because he is not on blood pressure medication i.e. no ACE inhibitor or ARB, we need to continue to do the microalbumin once every year and if microalbumin was elevated that would be an independent reason for him to use that type of medication.    Subjective: 73-year-old male presenting for follow-up on the above.  Has a diagnosis of diabetes made earlier this year.  See those notes in February.  He has worked a gradual weight reduction through healthy diet and regular exercise.  He notes his blood sugar is 117 today.  He usually checks it every day and only occasionally misses.  Regarding lipids, he notes he still on the simvastatin 20 mg which she had a large supply of.  No diarrhea constipation muscle aching or muscle weakness.  He does have the prescription for the atorvastatin 40 mg.  Regarding reflux is not having any current indigestion etc. on his current regimen.  Past Medical History:   Diagnosis Date     Diabetes mellitus type 2, noninsulin dependent (H) 2/7/2018     Hyperlipemia      Reflux esophagitis      No Known Allergies  Current Outpatient  "Prescriptions   Medication Sig Dispense Refill     aspirin 81 MG EC tablet Take 81 mg by mouth daily.       atorvastatin (LIPITOR) 40 MG tablet Take 1 tablet (40 mg total) by mouth daily. 90 tablet 1     blood glucose control, high Soln Use as directed 1 each 1     blood glucose test strips Use to test blood sugar once per day. One Touch Verio test strips or strips covered by insurance 100 strip 4     lancets (ONETOUCH DELICA LANCETS) 30 gauge Misc Use to test blood sugar once per day. 100 each 4     multivitamin therapeutic (THERAGRAN) tablet Take 1 tablet by mouth daily.       naproxen sodium (ALEVE) 220 MG tablet Take 220 mg by mouth every 12 (twelve) hours as needed.       omeprazole (PRILOSEC) 20 MG capsule Take 1 capsule (20 mg total) by mouth daily before breakfast. 90 capsule 1     sildenafil (VIAGRA) 50 MG tablet Take 50 mg by mouth. One hour prior to sexual activity.       No current facility-administered medications for this visit.      All other review of systems are negative.    Objective:/84  Pulse (!) 51  Resp 20  Ht 6' 2.25\" (1.886 m)  Wt (!) 228 lb (103.4 kg)  SpO2 96%  BMI 29.08 kg/m2  HEENT examination is negative.  Neck supple without adenopathy or thyromegaly.  Lungs clear.  Heart regular rate and rhythm without murmur.  Abdomen shows no masses tenderness or hepatosplenomegaly.  No pedal edema.  He is alert with clear speech.  "

## 2021-06-20 NOTE — LETTER
Letter by Samantha Armijo MD at      Author: Samantha Armijo MD Service: -- Author Type: --    Filed:  Encounter Date: 10/12/2020 Status: (Other)         Ruy Gibbs  [address] [facility] [address]            October 12, 2020        Dear Mr. Gibbs,        Below are the results from your recent visit:    Resulted Orders   HM2(CBC w/o Differential)   Result Value Ref Range    WBC 5.4 4.0 - 11.0 thou/uL    RBC 5.05 4.40 - 6.20 mill/uL    Hemoglobin 15.2 14.0 - 18.0 g/dL    Hematocrit 45.1 40.0 - 54.0 %    MCV 89 80 - 100 fL    MCH 30.1 27.0 - 34.0 pg    MCHC 33.6 32.0 - 36.0 g/dL    RDW 12.4 11.0 - 14.5 %    Platelets 168 140 - 440 thou/uL    MPV 9.3 7.0 - 10.0 fL   Basic Metabolic Panel   Result Value Ref Range    Sodium 142 136 - 145 mmol/L    Potassium 4.2 3.5 - 5.0 mmol/L    Chloride 107 98 - 107 mmol/L    CO2 28 22 - 31 mmol/L    Anion Gap, Calculation 7 5 - 18 mmol/L    Glucose 129 (H) 70 - 125 mg/dL    Calcium 9.4 8.5 - 10.5 mg/dL    BUN 26 8 - 28 mg/dL    Creatinine 0.78 0.70 - 1.30 mg/dL    GFR MDRD Af Amer >60 >60 mL/min/1.73m2    GFR MDRD Non Af Amer >60 >60 mL/min/1.73m2    Narrative    Fasting Glucose reference range is 70-99 mg/dL per  American Diabetes Association (ADA) guidelines.   Glycosylated Hemoglobin A1c   Result Value Ref Range    Hemoglobin A1c 6.1 (H) <=5.6 %      Comment:      Normal <5.7% Prediabete 5.7-6.4% Diabletes 6.5% or higher - adopted from ADA consensus guidelines         Ruy, your laboratory results are normal.  That is good news.  Thank you for coming in to visit.  We should visit again in 6 months.      Sincerely,        BRE Armijo MD, MALACHI  [facility]  [phone]

## 2021-06-21 NOTE — LETTER
Letter by Samantha Armijo MD at      Author: Samantha Armijo MD Service: -- Author Type: --    Filed:  Encounter Date: 4/19/2021 Status: (Other)         Ruy Gibbs  [address] [facility] [address]            April 19, 2021        Dear Mr. Gibbs,        Below are the results from your recent visit:    Resulted Orders   HM2(CBC w/o Differential)   Result Value Ref Range    WBC 7.9 4.0 - 11.0 thou/uL    RBC 5.15 4.40 - 6.20 mill/uL    Hemoglobin 15.8 14.0 - 18.0 g/dL    Hematocrit 47.4 40.0 - 54.0 %    MCV 92 80 - 100 fL    MCH 30.7 27.0 - 34.0 pg    MCHC 33.3 32.0 - 36.0 g/dL    RDW 12.8 11.0 - 14.5 %    Platelets 166 140 - 440 thou/uL    MPV 11.2 (H) 7.0 - 10.0 fL   Basic Metabolic Panel   Result Value Ref Range    Sodium 143 136 - 145 mmol/L    Potassium 4.2 3.5 - 5.0 mmol/L    Chloride 109 (H) 98 - 107 mmol/L    CO2 27 22 - 31 mmol/L    Anion Gap, Calculation 7 5 - 18 mmol/L    Glucose 99 70 - 125 mg/dL    Calcium 9.6 8.5 - 10.5 mg/dL    BUN 31 (H) 8 - 28 mg/dL    Creatinine 0.86 0.70 - 1.30 mg/dL    GFR MDRD Af Amer >60 >60 mL/min/1.73m2    GFR MDRD Non Af Amer >60 >60 mL/min/1.73m2    Narrative    Fasting Glucose reference range is 70-99 mg/dL per  American Diabetes Association (ADA) guidelines.   Glycosylated Hemoglobin A1c   Result Value Ref Range    Hemoglobin A1c 6.6 (H) <=5.6 %   PSA (Prostatic-Specific Antigen), Annual Screen   Result Value Ref Range    PSA 1.9 0.0 - 6.5 ng/mL    Narrative    Method is Abbott Prostate-Specific Antigen (PSA)  Standard-WHO 1st International (90:10)         Rodger, your laboratory results are normal.  That is good news.  Thank you for coming in to visit.  We should visit again in 6 months.      Sincerely,        BRE Armijo MD, MALACHI  [facility]  [phone]

## 2021-06-22 NOTE — PROGRESS NOTES
Chief Complaint   Patient presents with     Blood Pressure Check     Pt c/o nose bleed last night while sleeping. He thinks its due to his BP medication.        HPI: Pleasant 73-year-old male presents today for recheck of hypertension and new onset problem of epistaxis.  He was started on losartan on December 12, 2018 by Dr. García in the note was reviewed.  In summary he had hypertension and it was felt that this was the medication of choice.  Since that time he has had 4-5 episodes of epistaxis over the past several weeks.  Of note, the weather during this period of time is been dry and cold.  He is never had epistaxis in the past.  He does not feel faint or dizzy.    His blood pressure is been well controlled on the current medication.    ROS: No syncope no chest pain no difficulty stopping the bleeding    SH:    reports that he quit smoking about 50 years ago. he has never used smokeless tobacco. He reports that he drinks about 0.6 oz of alcohol per week. He reports that he does not use drugs.      FH: The Patient's family history includes Prostate cancer (age of onset: 71) in his father.     Meds:  Ruy has a current medication list which includes the following prescription(s): aspirin, atorvastatin, blood glucose control, high, blood glucose test, lancets, losartan, multivitamin therapeutic, naproxen sodium, omeprazole, and sildenafil.    O:  /80   Pulse 65   Resp 16   Wt (!) 229 lb 1 oz (103.9 kg)   SpO2 96%   BMI 29.21 kg/m    Alert conversant no acute distress  Examination of the nares shows mild area of crusted blood on the lateral aspect of the right nares  Skin pink and dry    A/P:   1. Epistaxis  - Ambulatory referral to ENT    2. Benign essential hypertension  Continue losartan and follow-up with Dr. García

## 2021-06-22 NOTE — PROGRESS NOTES
Assessment:  1.  Non-insulin-dependent diabetes mellitus, checking status.  2.  Hyperlipidemia, checking status.  3.  New-onset diagnosis of hypertension.    Plan: Start losartan 50 mg-#90-refill x1-1 p.o. daily.  Recommend low-sodium diet.  Continue regular exercise and healthy diet for weight reduction.  Get blood pressure check by nurse in 3-4 weeks.  If that is at goal then follow-up to see me in 4 months for next diabetic recheck.    Subjective: 73-year-old male here for follow-up of diabetes and hyperlipidemia etc.  Does check blood sugars almost daily and the readings are usually 130-140.  Continues to be careful with his diet.  Keeps physically active.  Regarding lipids no diarrhea constipation muscle aching or muscle weakness.  Generally has been feeling well.  Past Medical History:   Diagnosis Date     Benign essential hypertension 12/12/2018     Diabetes mellitus type 2, noninsulin dependent (H) 2/7/2018     Hyperlipemia      Reflux esophagitis      No Known Allergies  Current Outpatient Medications   Medication Sig Dispense Refill     aspirin 81 MG EC tablet Take 81 mg by mouth daily.       atorvastatin (LIPITOR) 40 MG tablet Take 1 tablet (40 mg total) by mouth daily. 90 tablet 1     blood glucose test strips Use to test blood sugar once per day. One Touch Verio test strips or strips covered by insurance 100 strip 4     lancets (ONETOUCH DELICA LANCETS) 30 gauge Misc Use to test blood sugar once per day. 100 each 4     multivitamin therapeutic (THERAGRAN) tablet Take 1 tablet by mouth daily.       naproxen sodium (ALEVE) 220 MG tablet Take 220 mg by mouth every 12 (twelve) hours as needed.       omeprazole (PRILOSEC) 20 MG capsule TAKE 1 CAPSULE DAILY BEFORE BREAKFAST 90 capsule 1     sildenafil (VIAGRA) 50 MG tablet Take 50 mg by mouth. One hour prior to sexual activity.       blood glucose control, high Soln Use as directed 1 each 1     losartan (COZAAR) 50 MG tablet Take 1 tablet (50 mg total) by  mouth daily Take one tablet by mouth daily. 90 tablet 1     No current facility-administered medications for this visit.      All other review of systems are negative.    Objective:/90 (Patient Site: Right Arm, Patient Position: Sitting, Cuff Size: Adult Large)   Pulse 69   Resp 20   Wt (!) 229 lb (103.9 kg)   SpO2 98%   BMI 29.20 kg/m    HEENT examination is negative.  Neck supple without adenopathy or thyromegaly.  Lungs clear.  Heart regular rate and rhythm without murmur.  Abdomen shows no masses tenderness or paraspinal megaly.  No pedal edema.  He is alert with clear speech.

## 2021-06-22 NOTE — PROGRESS NOTES
HPI: This patient is a 74yo M who presents to clinic for evaluation of epistaxis at the request of Dr. Armijo. States that it has been going on for about 3 weeks and is mainly out of the rightside. They start and stop spontaneously and are mainly just spots on the kleenex after he blows his nose. None of them have required an ER visit or packing. Not currently using any nasal saline or other nasal preparations.     Past medical history, surgical history, social history, family history, medications, and allergies have been reviewed with the patient and are documented above.    Review of Systems: a 10-system review was performed. Pertinent positives are noted in the HPI and on a separate scanned document in the chart.    PHYSICAL EXAMINATION:  GEN: no acute distress, normocephalic  EYES: extraocular movements are intact, pupils are equal and round. Sclera clear.   EARS: auricles are normally formed. The external auditory canals are clear with minimal to no cerumen. Tympanic membranes are intact bilaterally with no signs of infection, effusion, retractions, or perforations.  NOSE: anterior nares are patent. There are no masses or lesions. The septum is non-obstructing, but dry with exposed vessels on the skin portion of the soft tissue triangle. These do not cauterize well.   OC/OP: clear, dentition is in good repair. The tongue and palate are fully mobile and symmetric. No masses or lesions. S/p tonsillectomy  NECK: soft and supple. No lymphadenopathy or masses. Airway is midline.  NEURO: CN VII and XII symmetric. alert and oriented. No spontaneous nystagmus. Gait is normal.  PULM: breathing comfortably on room air, normal chest expansion with respiration  CARDS: no cyanosis or clubbing, normal carotid pulses    MEDICAL DECISION-MAKING: This patient is a 74yo M with epistaxis from the skin of the nasal vestibule. The area in question was not cauterized today in clinic as this tissue does not cauterize well. Advised on  nasal salineand aquaphor use. RTC PRN.

## 2021-06-23 NOTE — TELEPHONE ENCOUNTER
Refill Approved    Rx renewed per Medication Renewal Policy. Medication was last renewed on 03 26 18    [facility], [facility] Connection Triage/Med Refill 1/24/2019     Requested Prescriptions   Pending Prescriptions Disp Refills     atorvastatin (LIPITOR) 40 MG tablet 90 tablet 1     Sig: Take 1 tablet (40 mg total) by mouth daily.    Statins Refill Protocol (Hmg CoA Reductase Inhibitors) Passed - 1/22/2019 10:08 AM       Passed - PCP or prescribing provider visit in past 12 months     Last office visit with prescriber/PCP: 12/12/2018 Taiwo García MD OR same dept: 12/28/2018 Samantha Armijo MD OR same specialty: 12/28/2018 Samantha Armijo MD  Last physical: 2/6/2018 Last MTM visit: Visit date not found   Next visit within 3 mo: Visit date not found  Next physical within 3 mo: Visit date not found  Prescriber OR PCP: Taiwo García MD  Last diagnosis associated with med order: There are no diagnoses linked to this encounter.  If protocol passes may refill for 12 months if within 3 months of last provider visit (or a total of 15 months).             losartan (COZAAR) 50 MG tablet 90 tablet 1     Sig: Take 1 tablet (50 mg total) by mouth daily. Take one tablet by mouth daily    Angiotensin Receptor Blocker Protocol Passed - 1/22/2019 10:08 AM       Passed - PCP or prescribing provider visit in past 12 months      Last office visit with prescriber/PCP: 12/12/2018 Taiwo García MD OR same dept: 12/28/2018 Samantha Armijo MD OR same specialty: 12/28/2018 Samantha Armijo MD  Last physical: 2/6/2018 Last MTM visit: Visit date not found   Next visit within 3 mo: Visit date not found  Next physical within 3 mo: Visit date not found  Prescriber OR PCP: Taiwo García MD  Last diagnosis associated with med order: There are no diagnoses linked to this encounter.  If protocol passes may refill for 12 months if within 3 months of last provider visit (or a total of 15 months).            Passed  - Serum potassium within the past 12 months    Lab Results   Component Value Date    Potassium 4.6 12/12/2018            Passed - Blood pressure filed in past 12 months    BP Readings from Last 1 Encounters:   12/28/18 138/80            Passed - Serum creatinine within the past 12 months    Creatinine   Date Value Ref Range Status   12/12/2018 0.77 0.70 - 1.30 mg/dL Final

## 2021-06-26 ENCOUNTER — HEALTH MAINTENANCE LETTER (OUTPATIENT)
Age: 76
End: 2021-06-26

## 2021-07-07 ENCOUNTER — RECORDS - HEALTHEAST (OUTPATIENT)
Dept: ADMINISTRATIVE | Facility: OTHER | Age: 76
End: 2021-07-07

## 2021-07-07 LAB — RETINOPATHY: NEGATIVE

## 2021-07-09 ENCOUNTER — RECORDS - HEALTHEAST (OUTPATIENT)
Dept: HEALTH INFORMATION MANAGEMENT | Facility: CLINIC | Age: 76
End: 2021-07-09

## 2021-09-30 ENCOUNTER — OFFICE VISIT (OUTPATIENT)
Dept: FAMILY MEDICINE | Facility: CLINIC | Age: 76
End: 2021-09-30
Payer: COMMERCIAL

## 2021-09-30 VITALS
DIASTOLIC BLOOD PRESSURE: 80 MMHG | WEIGHT: 218.7 LBS | OXYGEN SATURATION: 95 % | BODY MASS INDEX: 28.08 KG/M2 | SYSTOLIC BLOOD PRESSURE: 140 MMHG | HEART RATE: 70 BPM

## 2021-09-30 DIAGNOSIS — K21.00 GASTROESOPHAGEAL REFLUX DISEASE WITH ESOPHAGITIS WITHOUT HEMORRHAGE: ICD-10-CM

## 2021-09-30 DIAGNOSIS — Z00.00 ENCOUNTER FOR MEDICARE ANNUAL WELLNESS EXAM: Primary | ICD-10-CM

## 2021-09-30 DIAGNOSIS — G56.02 CARPAL TUNNEL SYNDROME OF LEFT WRIST: ICD-10-CM

## 2021-09-30 DIAGNOSIS — Z23 NEED FOR INFLUENZA VACCINATION: ICD-10-CM

## 2021-09-30 DIAGNOSIS — E78.2 MIXED HYPERLIPIDEMIA: ICD-10-CM

## 2021-09-30 DIAGNOSIS — I10 BENIGN ESSENTIAL HYPERTENSION: ICD-10-CM

## 2021-09-30 DIAGNOSIS — E11.9 TYPE 2 DIABETES MELLITUS WITHOUT COMPLICATION, WITHOUT LONG-TERM CURRENT USE OF INSULIN (H): ICD-10-CM

## 2021-09-30 LAB
ALBUMIN SERPL-MCNC: 4.2 G/DL (ref 3.5–5)
ALP SERPL-CCNC: 74 U/L (ref 45–120)
ALT SERPL W P-5'-P-CCNC: 18 U/L (ref 0–45)
ANION GAP SERPL CALCULATED.3IONS-SCNC: 10 MMOL/L (ref 5–18)
AST SERPL W P-5'-P-CCNC: 16 U/L (ref 0–40)
BILIRUB SERPL-MCNC: 1.4 MG/DL (ref 0–1)
BUN SERPL-MCNC: 17 MG/DL (ref 8–28)
CALCIUM SERPL-MCNC: 9.8 MG/DL (ref 8.5–10.5)
CHLORIDE BLD-SCNC: 105 MMOL/L (ref 98–107)
CHOLEST SERPL-MCNC: 129 MG/DL
CO2 SERPL-SCNC: 27 MMOL/L (ref 22–31)
CREAT SERPL-MCNC: 0.8 MG/DL (ref 0.7–1.3)
CREAT UR-MCNC: 232 MG/DL
FASTING STATUS PATIENT QL REPORTED: YES
GFR SERPL CREATININE-BSD FRML MDRD: 87 ML/MIN/1.73M2
GLUCOSE BLD-MCNC: 121 MG/DL (ref 70–125)
HBA1C MFR BLD: 6.5 % (ref 0–5.6)
HDLC SERPL-MCNC: 41 MG/DL
LDLC SERPL CALC-MCNC: 68 MG/DL
MICROALBUMIN UR-MCNC: 1.99 MG/DL (ref 0–1.99)
MICROALBUMIN/CREAT UR: 8.6 MG/G CR
POTASSIUM BLD-SCNC: 4 MMOL/L (ref 3.5–5)
PROT SERPL-MCNC: 6.5 G/DL (ref 6–8)
SODIUM SERPL-SCNC: 142 MMOL/L (ref 136–145)
TRIGL SERPL-MCNC: 98 MG/DL

## 2021-09-30 PROCEDURE — 80061 LIPID PANEL: CPT | Performed by: FAMILY MEDICINE

## 2021-09-30 PROCEDURE — 36415 COLL VENOUS BLD VENIPUNCTURE: CPT | Performed by: FAMILY MEDICINE

## 2021-09-30 PROCEDURE — G0008 ADMIN INFLUENZA VIRUS VAC: HCPCS | Performed by: FAMILY MEDICINE

## 2021-09-30 PROCEDURE — 80053 COMPREHEN METABOLIC PANEL: CPT | Performed by: FAMILY MEDICINE

## 2021-09-30 PROCEDURE — 83036 HEMOGLOBIN GLYCOSYLATED A1C: CPT | Performed by: FAMILY MEDICINE

## 2021-09-30 PROCEDURE — 82043 UR ALBUMIN QUANTITATIVE: CPT | Performed by: FAMILY MEDICINE

## 2021-09-30 PROCEDURE — 90662 IIV NO PRSV INCREASED AG IM: CPT | Performed by: FAMILY MEDICINE

## 2021-09-30 PROCEDURE — 99397 PER PM REEVAL EST PAT 65+ YR: CPT | Mod: 25 | Performed by: FAMILY MEDICINE

## 2021-09-30 ASSESSMENT — ACTIVITIES OF DAILY LIVING (ADL): CURRENT_FUNCTION: NO ASSISTANCE NEEDED

## 2021-09-30 NOTE — PROGRESS NOTES
"SUBJECTIVE:   uRy Gibbs is a 76 year old male who presents for Preventive Visit.    Patient has been advised of split billing requirements and indicates understanding: Yes   Are you in the first 12 months of your Medicare coverage?  No    Healthy Habits:     In general, how would you rate your overall health?  Good    Frequency of exercise:  2-3 days/week    Duration of exercise:  15-30 minutes    Do you usually eat at least 4 servings of fruit and vegetables a day, include whole grains    & fiber and avoid regularly eating high fat or \"junk\" foods?  No    Taking medications regularly:  No    Medication side effects:  None    Ability to successfully perform activities of daily living:  No assistance needed    Home Safety:  No safety concerns identified    Hearing Impairment:  No hearing concerns    In the past 6 months, have you been bothered by leaking of urine?  No    In general, how would you rate your overall mental or emotional health?  Excellent      PHQ-2 Total Score: 0    Additional concerns today:  No    Do you feel safe in your environment? Yes    Have you ever done Advance Care Planning? (For example, a Health Directive, POLST, or a discussion with a medical provider or your loved ones about your wishes): Yes, advance care planning is on file.     Fall risk     Cognitive Screening   1) Repeat 3 items (Leader, Season, Table)    2) Clock draw: NORMAL  3) 3 item recall: Recalls 2 objects   Results: NORMAL clock, 1-2 items recalled: COGNITIVE IMPAIRMENT LESS LIKELY    Mini-CogTM Copyright SEYMOUR Ortiz. Licensed by the author for use in [facility]; reprinted with permission (nasir@.[facility]). All rights reserved.      Do you have sleep apnea, excessive snoring or daytime drowsiness?: no    Reviewed and updated as needed this visit by clinical staff  Tobacco  Allergies  Meds              Reviewed and updated as needed this visit by Provider  Tobacco               Social History     Tobacco Use "     Smoking status: Former Smoker     Quit date: 1969     Years since quittin.7     Smokeless tobacco: Never Used   Substance Use Topics     Alcohol use: Yes     Alcohol/week: 1.0 standard drinks     Comment: Alcoholic Drinks/day: occassional     If you drink alcohol do you typically have >3 drinks per day or >7 drinks per week? No    Alcohol Use 2021   Prescreen: >3 drinks/day or >7 drinks/week? No   Prescreen: >3 drinks/day or >7 drinks/week? -   No flowsheet data found.      Current providers sharing in care for this patient include:   Patient Care Team:  Gonzalo Nolasco MD as PCP - General (Family Medicine)  Samantha Armijo MD as Assigned PCP  Viktoria Grimaldo MD as Assigned Surgical Provider    The following health maintenance items are reviewed in Epic and correct as of today:  Health Maintenance Due   Topic Date Due     ANNUAL REVIEW OF HM ORDERS  Never done     DTAP/TDAP/TD IMMUNIZATION (1 - Tdap) 2012     FALL RISK ASSESSMENT  10/07/2021     Lab work is in process  Labs reviewed in EPIC  BP Readings from Last 3 Encounters:   21 (!) 140/80   04/15/21 120/82   10/07/20 120/64    Wt Readings from Last 3 Encounters:   21 99.2 kg (218 lb 11.2 oz)   04/15/21 102.1 kg (225 lb)   10/07/20 96.6 kg (213 lb)                  Patient Active Problem List   Diagnosis     Male Erectile Disorder     Hyperlipidemia     Chronic Reflux Esophagitis     Diabetes mellitus type 2, noninsulin dependent (H)     Benign essential hypertension     Past Surgical History:   Procedure Laterality Date     CHOLECYSTECTOMY  2014    [facility] at [facility]     HC REMOVAL OF TONSILS,<13 Y/O      Description: Tonsillectomy;  Recorded: 2008;     INGUINAL HERNIA REPAIR Left 2016    Procedure: LEFT INGUINAL HERNIA REPAIR;  Surgeon: Drew Way MD;  Location: [facility];  Service:      OTHER SURGICAL HISTORY [facility] inferior oblique myome[facility] at same time     TONSILLECTOMY         Social History     Tobacco Use     Smoking status: Former Smoker     Quit date: 1969     Years since quittin.7     Smokeless tobacco: Never Used   Substance Use Topics     Alcohol use: Yes     Alcohol/week: 1.0 standard drinks     Comment: Alcoholic Drinks/day: occassional     Family History   Problem Relation Age of Onset     Prostate Cancer Father 71.00        early 70s         Current Outpatient Medications   Medication Sig Dispense Refill     atorvastatin (LIPITOR) 40 MG tablet [ATORVASTATIN (LIPITOR) 40 MG TABLET] TAKE 1 TABLET DAILY 90 tablet 2     Glucos-MSM-C-Oj-Zwksae-Uzabwc (GLUCOSAMINE MSM COMPLEX) TABS tablet Take by mouth daily       losartan (COZAAR) 50 MG tablet [LOSARTAN (COZAAR) 50 MG TABLET] TAKE 1 TABLET DAILY 90 tablet 2     multivitamin therapeutic (THERAGRAN) tablet [MULTIVITAMIN THERAPEUTIC (THERAGRAN) TABLET] Take 1 tablet by mouth daily.       omeprazole (PRILOSEC) 20 MG capsule [OMEPRAZOLE (PRILOSEC) 20 MG CAPSULE] TAKE 1 CAPSULE DAILY BEFORE BREAKFAST 90 capsule 2     No Known Allergies  Recent Labs   Lab Test 21  1236 04/15/21  1352 10/07/20  1108 10/07/20  1108 20  0936 20  0936 19  1133 19  1133   A1C 6.5* 6.6*  --  6.1*   < > 6.2*   < > 6.2*   LDL 68  --   --   --   --  61  --  73   HDL 41  --   --   --   --  38*  --  32*   TRIG 98  --   --   --   --  92  --  118   ALT 18  --   --   --   --  18  --  18   CR 0.80 0.86   < > 0.78   < > 0.80   < > 0.88   GFRESTIMATED 87 >60   < > >60   < > >60   < > >60   GFRESTBLACK  --  >60  --  >60   < > >60   < > >60   POTASSIUM 4.0 4.2   < > 4.2   < > 4.5   < > 4.7    < > = values in this interval not displayed.            Review of Systems  CONSTITUTIONAL: NEGATIVE for fever, chills, change in weight  INTEGUMENTARY/SKIN: NEGATIVE for worrisome rashes, moles or lesions  EYES: NEGATIVE for vision changes or  "irritation  ENT/MOUTH: NEGATIVE for ear, mouth and throat problems  ENT/MOUTH: epistaxis  RESP: NEGATIVE for significant cough or SOB  CV: NEGATIVE for chest pain, palpitations or peripheral edema  GI: NEGATIVE for nausea, abdominal pain, heartburn, or change in bowel habits  : NEGATIVE for frequency, dysuria, or hematuria  MUSCULOSKELETAL: NEGATIVE for significant arthralgias or myalgia  NEURO: NEGATIVE for weakness, dizziness or paresthesias  NEURO: Has had bilateral carpal tunnel syndrome with surgery already done on the right side and next week scheduled for surgery on the left.  ENDOCRINE: NEGATIVE for temperature intolerance, skin/hair changes  HEME: NEGATIVE for bleeding problems  PSYCHIATRIC: NEGATIVE for changes in mood or affect    OBJECTIVE:   BP (!) 140/80 (BP Location: Right arm, Patient Position: Sitting, Cuff Size: Adult Large)   Pulse 70   Wt 99.2 kg (218 lb 11.2 oz)   SpO2 95%   BMI 28.08 kg/m   Estimated body mass index is 28.08 kg/m  as calculated from the following:    Height as of 4/15/21: 1.88 m (6' 2\").    Weight as of this encounter: 99.2 kg (218 lb 11.2 oz).  Physical Exam  GENERAL: healthy, alert and no distress  EYES: Eyes grossly normal to inspection, PERRL and conjunctivae and sclerae normal  HENT: ear canals and TM's normal, nose and mouth without ulcers or lesions  NECK: no adenopathy, no asymmetry, masses, or scars and thyroid normal to palpation  RESP: lungs clear to auscultation - no rales, rhonchi or wheezes  CV: regular rate and rhythm, normal S1 S2, no S3 or S4, no murmur, click or rub, no peripheral edema and peripheral pulses strong  ABDOMEN: soft, nontender, no hepatosplenomegaly, no masses and bowel sounds normal  MS: no gross musculoskeletal defects noted, no edema  SKIN: no suspicious lesions or rashes  SKIN: no suspicious lesions or rashes and the patient has fairly generalized trunk and lower extremity seborrheic keratoses and some other hyperkeratotic " areas.  NEURO: Normal strength and tone, mentation intact and speech normal  Examination of both feet revealed no edema of the lower extremity or feet, pulses are 4+ bilaterally, patient has a lot of hyperkeratotic skin lesions on his legs, sensation monofilament 5.07 dorsal and plantar surface of both feet without abnormalities and full sensation present  PSYCH: mentation appears normal, affect normal/bright    Diagnostic Test Results:  Labs reviewed in Epic  Results for orders placed or performed in visit on 09/30/21   Albumin Random Urine Quantitative with Creat Ratio     Status: None   Result Value Ref Range    Microalbumin Urine mg/dL 1.99 0.00 - 1.99 mg/dL    Creatinine Urine mg/dL 232 mg/dL    Microalbumin Urine mg/g Cr 8.6 <=19.9 mg/g Cr    Narrative    Microalbumin, Random Urine   <2.0 mg/dL . . . . . . . . Normal   3.0-30.0 mg/dL . . . . . . Microalbuminuria   >30.0 mg/dL . . . . . .  . Clinical Proteinuria     Microalbumin/Creatinine Ratio, Random Urine   <20 mg/g . . . . .. . . . Normal    mg/g . . . . . . . Microalbuminuria   >300 mg/g . . . . . . . . Clinical Proteinuria   Lipid panel reflex to direct LDL Fasting     Status: None   Result Value Ref Range    Cholesterol 129 <=199 mg/dL    Triglycerides 98 <=149 mg/dL    Direct Measure HDL 41 >=40 mg/dL    LDL Cholesterol Calculated 68 <=129 mg/dL    Patient Fasting > 8hrs? Yes    Hemoglobin A1c     Status: Abnormal   Result Value Ref Range    Hemoglobin A1C 6.5 (H) 0.0 - 5.6 %   Comprehensive metabolic panel     Status: Abnormal   Result Value Ref Range    Sodium 142 136 - 145 mmol/L    Potassium 4.0 3.5 - 5.0 mmol/L    Chloride 105 98 - 107 mmol/L    Carbon Dioxide (CO2) 27 22 - 31 mmol/L    Anion Gap 10 5 - 18 mmol/L    Urea Nitrogen 17 8 - 28 mg/dL    Creatinine 0.80 0.70 - 1.30 mg/dL    Calcium 9.8 8.5 - 10.5 mg/dL    Glucose 121 70 - 125 mg/dL    Alkaline Phosphatase 74 45 - 120 U/L    AST 16 0 - 40 U/L    ALT 18 0 - 45 U/L    Protein Total  6.5 6.0 - 8.0 g/dL    Albumin 4.2 3.5 - 5.0 g/dL    Bilirubin Total 1.4 (H) 0.0 - 1.0 mg/dL    GFR Estimate 87 >60 mL/min/1.73m2       ASSESSMENT / PLAN:   Ruy was seen today for establish care and physical.    Diagnoses and all orders for this visit:    Encounter for Medicare annual wellness exam  The patient will go ahead with his left carpal tunnel surgery next week  He has had a couple shots and his right shoulder for rotator cuff disease which is helping.  Patient has the ability to monitor his blood pressure at home and will do the same.  I suggested that the patient try acetaminophen for aches and pains rather than naproxen.  The naproxen which is being used most days could be raising his blood pressure.  Also his success with trying to stop his PPI will be more effective if not using any nonsteroidal anti-inflammatory medication.    Benign essential hypertension  -     Comprehensive metabolic panel, normal kidney function and electrolytes  The patient currently is on losartan 50 mg daily and will check some home blood pressures and after he has 6-10 readings he can send me a [employer] message with those numbers.  Goal blood pressure would be 130/80 or less and if needed we can increase his losartan to 100 mg daily    Mixed hyperlipidemia  -     Lipid panel reflex to direct LDL Fasting, 129/98/41/68  Very happy with these results and patient will continue on atorvastatin 40 mg daily    Type 2 diabetes mellitus without complication, without long-term current use of insulin (H)  -     Albumin Random Urine Quantitative with Creat Ratio, microalbumin/creatinine ratio 8.6 which is normal    -     Hemoglobin A1c, 6.5 which is satisfactory.  Patient will continue to treat his diabetes by lifestyle changes and diet control.  Currently not on any medication.    Gastroesophageal reflux disease with esophagitis without hemorrhage  , The patient has not had symptoms of heartburn and wonders if he has to keep taking  "omeprazole.  I shared with him that it would be fine to stop omeprazole because there are advantages to not being on a PPI if not needed.  If after a few days he is starting to have symptoms of acid reflux he can restart the omeprazole where the other option would be to use famotidine 20-40 mg once or twice a day.    Carpal tunnel syndrome of left wrist  Proceed with left carpal tunnel repair    Need for influenza vaccination  -     VA FLU VACCINE, INCREASED ANTIGEN, PRESV FREE ([id number])  Consider tetanus booster through the pharmacy  The patient did not fill out the fall risk assessment on the iPad but does not have any history of falls or concerns with his balance.  Note that the patient does have nosebleeds typically from right side that stops very easily in about 2 minutes or less.  No intervention for this at this time      Patient has been advised of split billing requirements and indicates understanding: Yes  COUNSELING:  Reviewed preventive health counseling, as reflected in patient instructions       Regular exercise       Healthy diet/nutrition       UV light protection    Estimated body mass index is 28.08 kg/m  as calculated from the following:    Height as of 4/15/21: 1.88 m (6' 2\").    Weight as of this encounter: 99.2 kg (218 lb 11.2 oz).    Weight management plan: Discussed healthy diet and exercise guidelines    He reports that he quit smoking about 52 years ago. He has never used smokeless tobacco.      Appropriate preventive services were discussed with this patient, including applicable screening as appropriate for cardiovascular disease, diabetes, osteopenia/osteoporosis, and glaucoma.  As appropriate for age/gender, discussed screening for colorectal cancer, prostate cancer, breast cancer, and cervical cancer. Checklist reviewing preventive services available has been given to the patient.    Reviewed patients plan of care and provided an AVS. The Basic Care Plan (routine screening as " documented in Health Maintenance) for Ruy meets the Care Plan requirement. This Care Plan has been established and reviewed with the Patient.    [facility]  Follow-up 6 months blood pressure and diabetes  Billing annual wellness visit without split billing    Gonzalo Nolasco MD  [facility]    Identified Health Risks:

## 2021-09-30 NOTE — PATIENT INSTRUCTIONS
Patient Education   Personalized Prevention Plan  You are due for the preventive services outlined below.  Your care team is available to assist you in scheduling these services.  If you have already completed any of these items, please share that information with your care team to update in your medical record.  Health Maintenance Due   Topic Date Due     Diabetic Foot Exam  Never done     ANNUAL REVIEW OF HM ORDERS  Never done     Diptheria Tetanus Pertussis (DTAP/TDAP/TD) Vaccine (1 - Tdap) 05/30/2012     Kidney Microalbumin Urine Test  04/22/2020     Cholesterol Lab  06/05/2021     Flu Vaccine (1) 09/01/2021     FALL RISK ASSESSMENT  10/07/2021       Understanding USDA MyPlate  The USDA has guidelines to help you make healthy food choices. These are called MyPlate. MyPlate shows the food groups that make up healthy meals using the image of a place setting. Before you eat, think about the healthiest choices for what to put on your plate or in your cup or bowl. To learn more about building a healthy plate, visit www.choosemyplate.gov.    The food groups    Fruits. Any fruit or 100% fruit juice counts as part of the Fruit Group. Fruits may be fresh, canned, frozen, or dried, and may be whole, cut-up, or pureed. Make 1/2 of your plate fruits and vegetables.    Vegetables. Any vegetable or 100% vegetable juice counts as a member of the Vegetable Group. Vegetables may be fresh, frozen, canned, or dried. They can be served raw or cooked and may be whole, cut-up, or mashed. Make 1/2 of your plate fruits and vegetables.    Grains. All foods made from grains are part of the Grains Group. These include wheat, rice, oats, cornmeal, and barley. Grains are often used to make foods such as bread, pasta, oatmeal, cereal, tortillas, and grits. Grains should be no more than 1/4 of your plate. At least half of your grains should be whole grains.    Protein. This group includes meat, poultry, seafood, beans and peas, eggs,  processed soy products (such as tofu), nuts (including nut butters), and seeds. Make protein choices no more than 1/4 of your plate. Meat and poultry choices should be lean or low fat.    Dairy. The Dairy Group includes all fluid milk products and foods made from milk that contain calcium, such as yogurt and cheese. (Foods that have little calcium, such as cream, butter, and cream cheese, are not part of this group.) Most dairy choices should be low-fat or fat-free.    Oils. Oils aren't a food group, but they do contain essential nutrients. However it's important to watch your intake of oils. These are fats that are liquid at room temperature. They include canola, corn, olive, soybean, vegetable, and sunflower oil. Foods that are mainly oil include mayonnaise, certain salad dressings, and soft margarines. You likely already get your daily oil allowance from the foods you eat.  Things to limit  Eating healthy also means limiting these things in your diet:       Salt (sodium). Many processed foods have a lot of sodium. To keep sodium intake down, eat fresh vegetables, meats, poultry, and seafood when possible. Purchase low-sodium, reduced-sodium, or no-salt-added food products at the store. And don't add salt to your meals at home. Instead, season them with herbs and spices such as dill, oregano, cumin, and paprika. Or try adding flavor with lemon or lime zest and juice.    Saturated fat. Saturated fats are most often found in animal products such as beef, pork, and chicken. They are often solid at room temperature, such as butter. To reduce your saturated fat intake, choose leaner cuts of meat and poultry. And try healthier cooking methods such as grilling, broiling, roasting, or baking. For a simple lower-fat swap, use plain nonfat yogurt instead of mayonnaise when making potato salad or macaroni salad.    Added sugars. These are sugars added to foods. They are in foods such as ice cream, candy, soda, fruit drinks,  sports drinks, energy drinks, cookies, pastries, jams, and syrups. Cut down on added sugars by sharing sweet treats with a family member or friend. You can also choose fruit for dessert, and drink water or other unsweetened beverages.     [employer] last reviewed this educational content on 6/1/2020 2000-2021 [employer]. All rights reserved. This information is not intended as a substitute for professional medical care. Always follow your healthcare professional's instructions.

## 2021-10-04 ENCOUNTER — ALLIED HEALTH/NURSE VISIT (OUTPATIENT)
Dept: FAMILY MEDICINE | Facility: CLINIC | Age: 76
End: 2021-10-04
Payer: COMMERCIAL

## 2021-10-04 VITALS — OXYGEN SATURATION: 98 % | HEART RATE: 60 BPM | SYSTOLIC BLOOD PRESSURE: 138 MMHG | DIASTOLIC BLOOD PRESSURE: 90 MMHG

## 2021-10-04 DIAGNOSIS — I10 BENIGN ESSENTIAL HYPERTENSION: Primary | ICD-10-CM

## 2021-10-04 PROCEDURE — 99207 PR NO CHARGE NURSE ONLY: CPT

## 2021-10-04 NOTE — PROGRESS NOTES
Ruy Gibbs is a 76 year old patient who comes in today for a Blood Pressure check.  Initial BP:  BP (!) 142/92   Pulse 60   SpO2 98%      60         2nd BP check 138/90       Pt has been running high at home with BP cuff,   And last appt.      Disposition: results routed to provider

## 2021-10-08 ENCOUNTER — MYC MEDICAL ADVICE (OUTPATIENT)
Dept: FAMILY MEDICINE | Facility: CLINIC | Age: 76
End: 2021-10-08

## 2021-10-08 DIAGNOSIS — I10 BENIGN ESSENTIAL HYPERTENSION: ICD-10-CM

## 2021-10-08 RX ORDER — LOSARTAN POTASSIUM 100 MG/1
100 TABLET ORAL DAILY
Qty: 90 TABLET | Refills: 3 | Status: SHIPPED | OUTPATIENT
Start: 2021-10-08 | End: 2022-09-16

## 2021-10-10 DIAGNOSIS — K21.00 REFLUX ESOPHAGITIS: ICD-10-CM

## 2021-10-10 DIAGNOSIS — E78.2 MIXED HYPERLIPIDEMIA: ICD-10-CM

## 2021-10-11 RX ORDER — ATORVASTATIN CALCIUM 40 MG/1
40 TABLET, FILM COATED ORAL DAILY
Qty: 90 TABLET | Refills: 3 | Status: SHIPPED | OUTPATIENT
Start: 2021-10-11 | End: 2022-10-10

## 2021-10-11 RX ORDER — LOSARTAN POTASSIUM 50 MG/1
TABLET ORAL
Qty: 90 TABLET | Refills: 3 | OUTPATIENT
Start: 2021-10-11

## 2021-10-11 NOTE — TELEPHONE ENCOUNTER
"Last Written Prescription Date:  1/13/21  Last Fill Quantity: 90,  # refills: 2   Last office visit provider:  9/30/21     Requested Prescriptions   Pending Prescriptions Disp Refills     omeprazole (PRILOSEC) 20 MG DR capsule [Pharmacy Med Name: OMEPRAZOLE DR CAPS 20MG] 90 capsule 3     Sig: TAKE 1 CAPSULE DAILY BEFORE BREAKFAST       PPI Protocol Passed - 10/10/2021  5:46 AM        Passed - Not on Clopidogrel (unless Pantoprazole ordered)        Passed - No diagnosis of osteoporosis on record        Passed - Recent (12 mo) or future (30 days) visit within the authorizing provider's specialty     Patient has had an office visit with the authorizing provider or a provider within the authorizing providers department within the previous 12 mos or has a future within next 30 days. See \"Patient Info\" tab in inbasket, or \"Choose Columns\" in Meds & Orders section of the refill encounter.              Passed - Medication is active on med list        Passed - Patient is age 18 or older           atorvastatin (LIPITOR) 40 MG tablet [Pharmacy Med Name: ATORVASTATIN TABS 40MG] 90 tablet 3     Sig: TAKE 1 TABLET DAILY       Statins Protocol Passed - 10/10/2021  5:46 AM        Passed - LDL on file in past 12 months     Recent Labs   Lab Test 09/30/21  1236   LDL 68             Passed - No abnormal creatine kinase in past 12 months     No lab results found.             Passed - Recent (12 mo) or future (30 days) visit within the authorizing provider's specialty     Patient has had an office visit with the authorizing provider or a provider within the authorizing providers department within the previous 12 mos or has a future within next 30 days. See \"Patient Info\" tab in inRossoliniet, or \"Choose Columns\" in Meds & Orders section of the refill encounter.              Passed - Medication is active on med list        Passed - Patient is age 18 or older           losartan (COZAAR) 50 MG tablet [Pharmacy Med Name: LOSARTAN TABS 50MG] 90 " "tablet 3     Sig: TAKE 1 TABLET DAILY       Angiotensin-II Receptors Failed - 10/10/2021  5:46 AM        Failed - Last blood pressure under 140/90 in past 12 months     BP Readings from Last 3 Encounters:   10/04/21 (!) 138/90   09/30/21 (!) 140/80   04/15/21 120/82                 Passed - Recent (12 mo) or future (30 days) visit within the authorizing provider's specialty     Patient has had an office visit with the authorizing provider or a provider within the authorizing providers department within the previous 12 mos or has a future within next 30 days. See \"Patient Info\" tab in inbasket, or \"Choose Columns\" in Meds & Orders section of the refill encounter.              Passed - Medication is active on med list        Passed - Patient is age 18 or older        Passed - Normal serum creatinine on file in past 12 months     Recent Labs   Lab Test 09/30/21  1236   CR 0.80       Ok to refill medication if creatinine is low          Passed - Normal serum potassium on file in past 12 months     Recent Labs   Lab Test 09/30/21  1236   POTASSIUM 4.0                         Nikolas Riley RN 10/11/21 9:45 AM  "

## 2021-10-11 NOTE — TELEPHONE ENCOUNTER
Disp Refills Start End AIDAN   losartan (COZAAR) 50 MG tablet (Discontinued) 90 tablet 2 1/13/2021 10/8/2021 No   Sig: [LOSARTAN (COZAAR) 50 MG TABLET] TAKE 1 TABLET DAILY   Class: Normal   Order: [id number]

## 2022-04-02 ENCOUNTER — HEALTH MAINTENANCE LETTER (OUTPATIENT)
Age: 77
End: 2022-04-02

## 2022-05-17 ASSESSMENT — ACTIVITIES OF DAILY LIVING (ADL): CURRENT_FUNCTION: NO ASSISTANCE NEEDED

## 2022-05-18 ENCOUNTER — OFFICE VISIT (OUTPATIENT)
Dept: FAMILY MEDICINE | Facility: CLINIC | Age: 77
End: 2022-05-18
Payer: COMMERCIAL

## 2022-05-18 VITALS
WEIGHT: 221.3 LBS | SYSTOLIC BLOOD PRESSURE: 136 MMHG | HEART RATE: 69 BPM | DIASTOLIC BLOOD PRESSURE: 86 MMHG | HEIGHT: 75 IN | OXYGEN SATURATION: 97 % | BODY MASS INDEX: 27.52 KG/M2

## 2022-05-18 DIAGNOSIS — E78.2 MIXED HYPERLIPIDEMIA: ICD-10-CM

## 2022-05-18 DIAGNOSIS — Z00.00 ENCOUNTER FOR MEDICARE ANNUAL WELLNESS EXAM: Primary | ICD-10-CM

## 2022-05-18 DIAGNOSIS — I10 BENIGN ESSENTIAL HYPERTENSION: ICD-10-CM

## 2022-05-18 DIAGNOSIS — K21.00 GASTROESOPHAGEAL REFLUX DISEASE WITH ESOPHAGITIS WITHOUT HEMORRHAGE: ICD-10-CM

## 2022-05-18 DIAGNOSIS — Z23 HIGH PRIORITY FOR 2019-NCOV VACCINE: ICD-10-CM

## 2022-05-18 DIAGNOSIS — E11.9 TYPE 2 DIABETES MELLITUS WITHOUT COMPLICATION, WITHOUT LONG-TERM CURRENT USE OF INSULIN (H): ICD-10-CM

## 2022-05-18 LAB
ANION GAP SERPL CALCULATED.3IONS-SCNC: 7 MMOL/L (ref 5–18)
BUN SERPL-MCNC: 25 MG/DL (ref 8–28)
CALCIUM SERPL-MCNC: 9.4 MG/DL (ref 8.5–10.5)
CHLORIDE BLD-SCNC: 107 MMOL/L (ref 98–107)
CO2 SERPL-SCNC: 27 MMOL/L (ref 22–31)
CREAT SERPL-MCNC: 0.8 MG/DL (ref 0.7–1.3)
GFR SERPL CREATININE-BSD FRML MDRD: >90 ML/MIN/1.73M2
GLUCOSE BLD-MCNC: 115 MG/DL (ref 70–125)
HBA1C MFR BLD: 6.6 % (ref 0–5.6)
POTASSIUM BLD-SCNC: 4.3 MMOL/L (ref 3.5–5)
SODIUM SERPL-SCNC: 141 MMOL/L (ref 136–145)

## 2022-05-18 PROCEDURE — 83036 HEMOGLOBIN GLYCOSYLATED A1C: CPT | Performed by: FAMILY MEDICINE

## 2022-05-18 PROCEDURE — 91305 COVID-19,PF,PFIZER (12+ YRS): CPT | Performed by: FAMILY MEDICINE

## 2022-05-18 PROCEDURE — 0054A COVID-19,PF,PFIZER (12+ YRS): CPT | Performed by: FAMILY MEDICINE

## 2022-05-18 PROCEDURE — 99213 OFFICE O/P EST LOW 20 MIN: CPT | Mod: 25 | Performed by: FAMILY MEDICINE

## 2022-05-18 PROCEDURE — 36415 COLL VENOUS BLD VENIPUNCTURE: CPT | Performed by: FAMILY MEDICINE

## 2022-05-18 PROCEDURE — G0438 PPPS, INITIAL VISIT: HCPCS | Performed by: FAMILY MEDICINE

## 2022-05-18 PROCEDURE — 80048 BASIC METABOLIC PNL TOTAL CA: CPT | Performed by: FAMILY MEDICINE

## 2022-05-18 ASSESSMENT — ACTIVITIES OF DAILY LIVING (ADL): CURRENT_FUNCTION: NO ASSISTANCE NEEDED

## 2022-05-18 ASSESSMENT — PAIN SCALES - GENERAL: PAINLEVEL: NO PAIN (0)

## 2022-05-18 NOTE — PROGRESS NOTES
"SUBJECTIVE:   Ruy Gibbs is a 76 year old male who presents for Preventive Visit.      Patient has been advised of split billing requirements and indicates understanding: Yes  Are you in the first 12 months of your Medicare coverage?  No    Healthy Habits:     In general, how would you rate your overall health?  Good    Frequency of exercise:  2-3 days/week    Duration of exercise:  15-30 minutes    Do you usually eat at least 4 servings of fruit and vegetables a day, include whole grains    & fiber and avoid regularly eating high fat or \"junk\" foods?  No    Taking medications regularly:  Yes    Medication side effects:  Muscle aches    Ability to successfully perform activities of daily living:  No assistance needed    Home Safety:  No safety concerns identified    Hearing Impairment:  No hearing concerns    In the past 6 months, have you been bothered by leaking of urine?  No    In general, how would you rate your overall mental or emotional health?  Excellent      PHQ-2 Total Score: 0    Additional concerns today:  No    Do you feel safe in your environment? Yes    Have you ever done Advance Care Planning? (For example, a Health Directive, POLST, or a discussion with a medical provider or your loved ones about your wishes): Yes, patient states has an Advance Care Planning document and will bring a copy to the clinic.       Fall risk  Fallen 2 or more times in the past year?: No  Any fall with injury in the past year?: No    Cognitive Screening   1) Repeat 3 items (Leader, Season, Table)    2) Clock draw: NORMAL  3) 3 item recall: Recalls 3 objects  Results: 3 items recalled: COGNITIVE IMPAIRMENT LESS LIKELY    Mini-CogTM Copyright SEYMOUR Ortiz. Licensed by the author for use in [facility]; reprinted with permission (nasir@.[email or web address]). All rights reserved.      Do you have sleep apnea, excessive snoring or daytime drowsiness?: no    Reviewed and updated as needed this visit by clinical staff   Tobacco  " Allergies  Meds                Reviewed and updated as needed this visit by Provider                   Social History     Tobacco Use     Smoking status: Former Smoker     Quit date: 1969     Years since quittin.4     Smokeless tobacco: Never Used   Substance Use Topics     Alcohol use: Yes     Alcohol/week: 1.0 standard drink     Comment: Alcoholic Drinks/day: occassional     If you drink alcohol do you typically have >3 drinks per day or >7 drinks per week? No    Alcohol Use 2022   Prescreen: >3 drinks/day or >7 drinks/week? No   Prescreen: >3 drinks/day or >7 drinks/week? -   No flowsheet data found.        PROBLEMS TO ADD ON...  Wishes to have his second booster on the COVID-19 vaccine    Current providers sharing in care for this patient include:   Patient Care Team:  Gonzalo Nolasco MD as PCP - General (Family Medicine)  Gonzalo Nolasco MD as Assigned PCP    The following health maintenance items are reviewed in Epic and correct as of today:  Health Maintenance Due   Topic Date Due     ANNUAL REVIEW OF HM ORDERS  Never done     DTAP/TDAP/TD IMMUNIZATION (1 - Tdap) 2012     COVID-19 Vaccine (4 - Booster for Pfizer series) 2022     A1C  2022     Labs reviewed in EPIC  BP Readings from Last 3 Encounters:   22 136/86   10/04/21 (!) 138/90   21 (!) 140/80    Wt Readings from Last 3 Encounters:   22 100.4 kg (221 lb 4.8 oz)   21 99.2 kg (218 lb 11.2 oz)   04/15/21 102.1 kg (225 lb)                  Patient Active Problem List   Diagnosis     Male Erectile Disorder     Hyperlipidemia     Chronic Reflux Esophagitis     Diabetes mellitus type 2, noninsulin dependent (H)     Benign essential hypertension     Past Surgical History:   Procedure Laterality Date     CHOLECYSTECTOMY  2014    dr. Way at [facility]     HC REMOVAL OF TONSILS,<13 Y/O      Description: Tonsillectomy;  Recorded: 2008;     INGUINAL HERNIA REPAIR Left 2016     Procedure: LEFT INGUINAL HERNIA REPAIR;  Surgeon: Drew Way MD;  Location: [facility] OR;  Service:      OTHER SURGICAL HISTORY [facility] also right inferior oblique myomectomy at same time     TONSILLECTOMY         Social History     Tobacco Use     Smoking status: Former Smoker     Quit date: 1969     Years since quittin.4     Smokeless tobacco: Never Used   Substance Use Topics     Alcohol use: Yes     Alcohol/week: 1.0 standard drink     Comment: Alcoholic Drinks/day: occassional     Family History   Problem Relation Age of Onset     Prostate Cancer Father 71.00        early 70s         Current Outpatient Medications   Medication Sig Dispense Refill     atorvastatin (LIPITOR) 40 MG tablet Take 1 tablet (40 mg) by mouth daily 90 tablet 3     Chondroitin Sulfate 400 MG CAPS Take 1,200 mg by mouth       Glucos-MSM-C-Zk-Auydoy-Zrtsir (GLUCOSAMINE MSM COMPLEX) TABS tablet Take by mouth daily       losartan (COZAAR) 100 MG tablet Take 1 tablet (100 mg) by mouth daily 90 tablet 3     multivitamin therapeutic (THERAGRAN) tablet [MULTIVITAMIN THERAPEUTIC (THERAGRAN) TABLET] Take 1 tablet by mouth daily.       Naproxen Sodium (ALEVE PO)        omeprazole (PRILOSEC) 20 MG DR capsule Take 1 capsule (20 mg) by mouth every other day Take first thing in AM on empty stomach 45 capsule 3     No Known Allergies  Recent Labs   Lab Test 22  1335 21  1236 04/15/21  1352 10/07/20  1108 20  0936 19  1133   A1C 6.6* 6.5* 6.6* 6.1* 6.2* 6.2*   LDL  --  68  --   --  61 73   HDL  --  41  --   --  38* 32*   TRIG  --  98  --   --  92 118   ALT  --  18  --   --  18 18   CR 0.80 0.80 0.86 0.78 0.80 0.88   GFRESTIMATED >90 87 >60 >60 >60 >60   GFRESTBLACK  --   --  >60 >60 >60 >60   POTASSIUM 4.3 4.0 4.2 4.2 4.5 4.7      COVID-19 #4 vaccine will be given today        Review of Systems  CONSTITUTIONAL: NEGATIVE for fever, chills, change in  "weight  INTEGUMENTARY/SKIN: NEGATIVE for worrisome rashes, moles or lesions  EYES: NEGATIVE for vision changes or irritation  ENT/MOUTH: NEGATIVE for ear, mouth and throat problems  RESP: NEGATIVE for significant cough or SOB  BREAST: NEGATIVE for masses, tenderness or discharge  CV: NEGATIVE for chest pain, palpitations or peripheral edema  GI: NEGATIVE for nausea, abdominal pain, heartburn, or change in bowel habits  : NEGATIVE for frequency, dysuria, or hematuria  MUSCULOSKELETAL: Low back stiffness for which he takes naproxen most days with benefit.  No radicular symptoms  NEURO: NEGATIVE for weakness, dizziness or paresthesias  ENDOCRINE: NEGATIVE for temperature intolerance, skin/hair changes  HEME: NEGATIVE for bleeding problems  PSYCHIATRIC: NEGATIVE for changes in mood or affect    OBJECTIVE:   /86   Pulse 69   Ht 1.892 m (6' 2.5\")   Wt 100.4 kg (221 lb 4.8 oz)   SpO2 97%   BMI 28.03 kg/m   Estimated body mass index is 28.03 kg/m  as calculated from the following:    Height as of this encounter: 1.892 m (6' 2.5\").    Weight as of this encounter: 100.4 kg (221 lb 4.8 oz).  Physical Exam  GENERAL: healthy, alert and no distress  EYES: Eyes grossly normal to inspection, PERRL and conjunctivae and sclerae normal  HENT: ear canals and TM's normal, nose and mouth without ulcers or lesions  NECK: no adenopathy, no asymmetry, masses, or scars and thyroid normal to palpation  RESP: lungs clear to auscultation - no rales, rhonchi or wheezes  CV: regular rate and rhythm, normal S1 S2, no S3 or S4, no murmur, click or rub, no peripheral edema and peripheral pulses strong  ABDOMEN: soft, nontender, no hepatosplenomegaly, no masses and bowel sounds normal  MS: no gross musculoskeletal defects noted, no edema  MS: spine exam shows no scoliosis and has good range of motion of his lumbar spine and some tenderness midline L4-S1 and also some tenderness in the paraspinous muscles bilaterally in this " region.  SKIN: no suspicious lesions or rashes  NEURO: Normal strength and tone, mentation intact and speech normal  PSYCH: mentation appears normal, affect normal/bright    Diagnostic Test Results:  Labs reviewed in Epic  Results for orders placed or performed in visit on 05/18/22   Hemoglobin A1c     Status: Abnormal   Result Value Ref Range    Hemoglobin A1C 6.6 (H) 0.0 - 5.6 %   Basic metabolic panel     Status: Normal   Result Value Ref Range    Sodium 141 136 - 145 mmol/L    Potassium 4.3 3.5 - 5.0 mmol/L    Chloride 107 98 - 107 mmol/L    Carbon Dioxide (CO2) 27 22 - 31 mmol/L    Anion Gap 7 5 - 18 mmol/L    Urea Nitrogen 25 8 - 28 mg/dL    Creatinine 0.80 0.70 - 1.30 mg/dL    Calcium 9.4 8.5 - 10.5 mg/dL    Glucose 115 70 - 125 mg/dL    GFR Estimate >90 >60 mL/min/1.73m2       ASSESSMENT / PLAN:   Ruy was seen today for physical and imm/inj.    Diagnoses and all orders for this visit:    Encounter for Medicare annual wellness exam  -     REVIEW OF HEALTH MAINTENANCE PROTOCOL ORDERS  The patient gets his eye exam at [facility] with Dr. Reno  Encouraged him to get an eye exam this year    Patient does have a living well and will make sure that gets on file    Encouraged him to get a tetanus booster at the pharmacy    Note that his wife is on home dialysis which has limited their ability to go very far away from home.  To go down to see their child in [place] and okay to stay overnight.    Type 2 diabetes mellitus without complication, without long-term current use of insulin (H)  -     Hemoglobin A1c, 6.6  The patient is on lifestyle therapy for his diabetes mellitus and his A1c is less than 7 and has not been on medication and will continue to control his diabetes with diet and exercise with keeping his weight down.      Gastroesophageal reflux disease with esophagitis without hemorrhage  The patient has been on a PPI for many years and really has not had any symptoms.  I suggested that he try  "to cut back on his PPI to every other day.  If he tolerates that continue with that until his next visit.  If he has breakthrough symptoms of reflux with that change then go back to daily usage of omeprazole 20 mg first thing in the morning before breakfast    Benign essential hypertension  -     Basic metabolic panel, electrolytes are normal with GFR greater than 90  Blood pressure is controlled and patient will continue on losartan 100 mg daily    Mixed hyperlipidemia  6 months ago the patient's lipids were checked and his LDL was 68  Patient will continue on atorvastatin 40 mg daily    High priority for 2019-nCoV vaccine  -     COVID-19,PF,PFIZER (12+ Yrs GRAY LABEL)        Patient has been advised of split billing requirements and indicates understanding: Yes    COUNSELING:  Reviewed preventive health counseling, as reflected in patient instructions       Regular exercise       Healthy diet/nutrition    Estimated body mass index is 28.03 kg/m  as calculated from the following:    Height as of this encounter: 1.892 m (6' 2.5\").    Weight as of this encounter: 100.4 kg (221 lb 4.8 oz).    Weight management plan: Discussed healthy diet and exercise guidelines    He reports that he quit smoking about 53 years ago. He has never used smokeless tobacco.      Appropriate preventive services were discussed with this patient, including applicable screening as appropriate for cardiovascular disease, diabetes, osteopenia/osteoporosis, and glaucoma.  As appropriate for age/gender, discussed screening for colorectal cancer, prostate cancer, breast cancer, and cervical cancer. Checklist reviewing preventive services available has been given to the patient.    Reviewed patients plan of care and provided an AVS. The Basic Care Plan (routine screening as documented in Health Maintenance) for Ruy meets the Care Plan requirement. This Care Plan has been established and reviewed with the Patient.    Counseling Resources:  ATP IV " Guidelines  Pooled Cohorts Equation Calculator  Breast Cancer Risk Calculator  Breast Cancer: Medication to Reduce Risk  FRAX Risk Assessment  ICSI Preventive Guidelines  Dietary Guidelines for Americans, 2010  USDA's MyPlate  ASA Prophylaxis  Lung CA Screening    Test results per [facility]  Follow-up 6 months chronic disease visit    Gonzalo Nolasco MD  [facility] LEILA LUNDBERG    Identified Health Risks:

## 2022-05-18 NOTE — PATIENT INSTRUCTIONS
Patient Education   Personalized Prevention Plan  You are due for the preventive services outlined below.  Your care team is available to assist you in scheduling these services.  If you have already completed any of these items, please share that information with your care team to update in your medical record.  Health Maintenance Due   Topic Date Due     ANNUAL REVIEW OF HM ORDERS  Never done     Diptheria Tetanus Pertussis (DTAP/TDAP/TD) Vaccine (1 - Tdap) 05/30/2012     COVID-19 Vaccine (4 - Booster for Pfizer series) 02/06/2022     A1C Lab  03/30/2022       Understanding USDA MyPlate  The USDA has guidelines to help you make healthy food choices. These are called MyPlate. MyPlate shows the food groups that make up healthy meals using the image of a place setting. Before you eat, think about the healthiest choices for what to put on your plate or in your cup or bowl. To learn more about building a healthy plate, visit www.choosemyplate.gov.    The food groups    Fruits. Any fruit or 100% fruit juice counts as part of the Fruit Group. Fruits may be fresh, canned, frozen, or dried, and may be whole, cut-up, or pureed. Make 1/2 of your plate fruits and vegetables.    Vegetables. Any vegetable or 100% vegetable juice counts as a member of the Vegetable Group. Vegetables may be fresh, frozen, canned, or dried. They can be served raw or cooked and may be whole, cut-up, or mashed. Make 1/2 of your plate fruits and vegetables.    Grains. All foods made from grains are part of the Grains Group. These include wheat, rice, oats, cornmeal, and barley. Grains are often used to make foods such as bread, pasta, oatmeal, cereal, tortillas, and grits. Grains should be no more than 1/4 of your plate. At least half of your grains should be whole grains.    Protein. This group includes meat, poultry, seafood, beans and peas, eggs, processed soy products (such as tofu), nuts (including nut butters), and seeds. Make protein choices  no more than 1/4 of your plate. Meat and poultry choices should be lean or low fat.    Dairy. The Dairy Group includes all fluid milk products and foods made from milk that contain calcium, such as yogurt and cheese. (Foods that have little calcium, such as cream, butter, and cream cheese, are not part of this group.) Most dairy choices should be low-fat or fat-free.    Oils. Oils aren't a food group, but they do contain essential nutrients. However it's important to watch your intake of oils. These are fats that are liquid at room temperature. They include canola, corn, olive, soybean, vegetable, and sunflower oil. Foods that are mainly oil include mayonnaise, certain salad dressings, and soft margarines. You likely already get your daily oil allowance from the foods you eat.  Things to limit  Eating healthy also means limiting these things in your diet:       Salt (sodium). Many processed foods have a lot of sodium. To keep sodium intake down, eat fresh vegetables, meats, poultry, and seafood when possible. Purchase low-sodium, reduced-sodium, or no-salt-added food products at the store. And don't add salt to your meals at home. Instead, season them with herbs and spices such as dill, oregano, cumin, and paprika. Or try adding flavor with lemon or lime zest and juice.    Saturated fat. Saturated fats are most often found in animal products such as beef, pork, and chicken. They are often solid at room temperature, such as butter. To reduce your saturated fat intake, choose leaner cuts of meat and poultry. And try healthier cooking methods such as grilling, broiling, roasting, or baking. For a simple lower-fat swap, use plain nonfat yogurt instead of mayonnaise when making potato salad or macaroni salad.    Added sugars. These are sugars added to foods. They are in foods such as ice cream, candy, soda, fruit drinks, sports drinks, energy drinks, cookies, pastries, jams, and syrups. Cut down on added sugars by  sharing sweet treats with a family member or friend. You can also choose fruit for dessert, and drink water or other unsweetened beverages.     Qnary last reviewed this educational content on 6/1/2020 2000-2021 [employer]. All rights reserved. This information is not intended as a substitute for professional medical care. Always follow your healthcare professional's instructions.

## 2022-05-20 NOTE — RESULT ENCOUNTER NOTE
Ruy,    It was nice to see you in the clinic earlier this week.  Your test results are back and I would like to review those results for you.    The tests included a metabolic panel which showed that your electrolytes including calcium were normal.  The urea nitrogen, creatinine and GFR look at your kidney function all of which is normal.  The blood glucose was 115.    The hemoglobin A1c was 6.6 which is similar to the readings over the last year.    As we discussed I would try to cut back on the omeprazole to every other day first thing in the morning before breakfast and see how that goes.    It seems that increasing your losartan to 100 mg has improved your blood pressure and please continue on that.  Make sure you get an eye exam this year.    Would recommend follow-up in 6 months with one of my colleagues.    Would suggest that you get a tetanus booster at the pharmacy this year.    If you have any questions or if I can help in any other way please let me know.  My last day of work is June 30, 2022.    Best wishes,  Dr. Nolasco

## 2022-07-12 ENCOUNTER — TRANSFERRED RECORDS (OUTPATIENT)
Dept: HEALTH INFORMATION MANAGEMENT | Facility: CLINIC | Age: 77
End: 2022-07-12

## 2022-07-12 LAB — RETINOPATHY: NEGATIVE

## 2022-09-06 ENCOUNTER — TRANSFERRED RECORDS (OUTPATIENT)
Dept: HEALTH INFORMATION MANAGEMENT | Facility: CLINIC | Age: 77
End: 2022-09-06

## 2022-09-15 DIAGNOSIS — I10 BENIGN ESSENTIAL HYPERTENSION: ICD-10-CM

## 2022-09-15 NOTE — TELEPHONE ENCOUNTER
Refill Request  Medication name: Pending Prescriptions:                       Disp   Refills    losartan (COZAAR) 100 MG tablet           90 tab*3            Sig: Take 1 tablet (100 mg) by mouth daily    [facility]ed [facility]: [facility]

## 2022-09-16 NOTE — TELEPHONE ENCOUNTER
"Former patient of Constance & has not established care with another provider.  Please assign refill request to covering provider per clinic standard process.    Routing refill request to provider for review/approval because:  Early refill requested.  No PCP    Last Written Prescription Date:  10/8/21  Last Fill Quantity: 90,  # refills: 3   Last office visit provider:  5/18/22     Requested Prescriptions   Pending Prescriptions Disp Refills     losartan (COZAAR) 100 MG tablet 90 tablet 3     Sig: Take 1 tablet (100 mg) by mouth daily       Angiotensin-II Receptors Passed - 9/16/2022 11:23 AM        Passed - Last blood pressure under 140/90 in past 12 months     BP Readings from Last 3 Encounters:   05/18/22 136/86   10/04/21 (!) 138/90   09/30/21 (!) 140/80                 Passed - Recent (12 mo) or future (30 days) visit within the authorizing provider's specialty     Patient has had an office visit with the authorizing provider or a provider within the authorizing providers department within the previous 12 mos or has a future within next 30 days. See \"Patient Info\" tab in inbasket, or \"Choose Columns\" in Meds & Orders section of the refill encounter.              Passed - Medication is active on med list        Passed - Patient is age 18 or older        Passed - Normal serum creatinine on file in past 12 months     Recent Labs   Lab Test 05/18/22  1335   CR 0.80       Ok to refill medication if creatinine is low          Passed - Normal serum potassium on file in past 12 months     Recent Labs   Lab Test 05/18/22  1335   POTASSIUM 4.3                         Nikolas Riley RN 09/16/22 11:23 AM  "

## 2022-09-19 RX ORDER — LOSARTAN POTASSIUM 100 MG/1
100 TABLET ORAL DAILY
Qty: 90 TABLET | Refills: 0 | Status: SHIPPED | OUTPATIENT
Start: 2022-09-19 | End: 2022-11-30

## 2022-10-01 ENCOUNTER — HEALTH MAINTENANCE LETTER (OUTPATIENT)
Age: 77
End: 2022-10-01

## 2022-11-30 ENCOUNTER — OFFICE VISIT (OUTPATIENT)
Dept: FAMILY MEDICINE | Facility: CLINIC | Age: 77
End: 2022-11-30
Payer: COMMERCIAL

## 2022-11-30 VITALS
TEMPERATURE: 97.7 F | RESPIRATION RATE: 16 BRPM | HEART RATE: 59 BPM | WEIGHT: 219 LBS | OXYGEN SATURATION: 97 % | SYSTOLIC BLOOD PRESSURE: 132 MMHG | DIASTOLIC BLOOD PRESSURE: 88 MMHG | BODY MASS INDEX: 27.23 KG/M2 | HEIGHT: 75 IN

## 2022-11-30 DIAGNOSIS — F52.8 OTHER SEXUAL DYSFUNCTION NOT DUE TO A SUBSTANCE OR KNOWN PHYSIOLOGICAL CONDITION: ICD-10-CM

## 2022-11-30 DIAGNOSIS — G56.03 BILATERAL CARPAL TUNNEL SYNDROME: ICD-10-CM

## 2022-11-30 DIAGNOSIS — I10 BENIGN ESSENTIAL HYPERTENSION: ICD-10-CM

## 2022-11-30 DIAGNOSIS — E78.5 HYPERLIPIDEMIA, UNSPECIFIED HYPERLIPIDEMIA TYPE: ICD-10-CM

## 2022-11-30 DIAGNOSIS — K21.00 GASTROESOPHAGEAL REFLUX DISEASE WITH ESOPHAGITIS WITHOUT HEMORRHAGE: Primary | ICD-10-CM

## 2022-11-30 DIAGNOSIS — E11.9 DIABETES MELLITUS TYPE 2, NONINSULIN DEPENDENT (H): ICD-10-CM

## 2022-11-30 DIAGNOSIS — M54.9 BACK PAIN, UNSPECIFIED BACK LOCATION, UNSPECIFIED BACK PAIN LATERALITY, UNSPECIFIED CHRONICITY: ICD-10-CM

## 2022-11-30 LAB
ALBUMIN SERPL BCG-MCNC: 4.5 G/DL (ref 3.5–5.2)
ALP SERPL-CCNC: 72 U/L (ref 40–129)
ALT SERPL W P-5'-P-CCNC: 16 U/L (ref 10–50)
ANION GAP SERPL CALCULATED.3IONS-SCNC: 12 MMOL/L (ref 7–15)
AST SERPL W P-5'-P-CCNC: 20 U/L (ref 10–50)
BILIRUB SERPL-MCNC: 1 MG/DL
BUN SERPL-MCNC: 18.2 MG/DL (ref 8–23)
CALCIUM SERPL-MCNC: 9.4 MG/DL (ref 8.8–10.2)
CHLORIDE SERPL-SCNC: 106 MMOL/L (ref 98–107)
CHOLEST SERPL-MCNC: 126 MG/DL
CREAT SERPL-MCNC: 0.8 MG/DL (ref 0.67–1.17)
CREAT UR-MCNC: 63.8 MG/DL
DEPRECATED HCO3 PLAS-SCNC: 24 MMOL/L (ref 22–29)
ERYTHROCYTE [DISTWIDTH] IN BLOOD BY AUTOMATED COUNT: 12.7 % (ref 10–15)
GFR SERPL CREATININE-BSD FRML MDRD: >90 ML/MIN/1.73M2
GLUCOSE SERPL-MCNC: 131 MG/DL (ref 70–99)
HBA1C MFR BLD: 6.8 % (ref 0–5.6)
HCT VFR BLD AUTO: 46.3 % (ref 40–53)
HDLC SERPL-MCNC: 41 MG/DL
HGB BLD-MCNC: 15.1 G/DL (ref 13.3–17.7)
LDLC SERPL CALC-MCNC: 64 MG/DL
MCH RBC QN AUTO: 30.3 PG (ref 26.5–33)
MCHC RBC AUTO-ENTMCNC: 32.6 G/DL (ref 31.5–36.5)
MCV RBC AUTO: 93 FL (ref 78–100)
MICROALBUMIN UR-MCNC: 31.2 MG/L
MICROALBUMIN/CREAT UR: 48.9 MG/G CR (ref 0–17)
NONHDLC SERPL-MCNC: 85 MG/DL
PLATELET # BLD AUTO: 188 10E3/UL (ref 150–450)
POTASSIUM SERPL-SCNC: 4.4 MMOL/L (ref 3.4–5.3)
PROT SERPL-MCNC: 6.8 G/DL (ref 6.4–8.3)
RBC # BLD AUTO: 4.98 10E6/UL (ref 4.4–5.9)
SODIUM SERPL-SCNC: 142 MMOL/L (ref 136–145)
TRIGL SERPL-MCNC: 105 MG/DL
WBC # BLD AUTO: 6.6 10E3/UL (ref 4–11)

## 2022-11-30 PROCEDURE — 99214 OFFICE O/P EST MOD 30 MIN: CPT | Performed by: PHYSICIAN ASSISTANT

## 2022-11-30 PROCEDURE — 80061 LIPID PANEL: CPT | Performed by: PHYSICIAN ASSISTANT

## 2022-11-30 PROCEDURE — 0124A COVID-19 VACCINE BIVALENT BOOSTER 12+ (PFIZER): CPT | Performed by: PHYSICIAN ASSISTANT

## 2022-11-30 PROCEDURE — 36415 COLL VENOUS BLD VENIPUNCTURE: CPT | Performed by: PHYSICIAN ASSISTANT

## 2022-11-30 PROCEDURE — 80053 COMPREHEN METABOLIC PANEL: CPT | Performed by: PHYSICIAN ASSISTANT

## 2022-11-30 PROCEDURE — 82043 UR ALBUMIN QUANTITATIVE: CPT | Performed by: PHYSICIAN ASSISTANT

## 2022-11-30 PROCEDURE — 91312 COVID-19 VACCINE BIVALENT BOOSTER 12+ (PFIZER): CPT | Performed by: PHYSICIAN ASSISTANT

## 2022-11-30 PROCEDURE — 83036 HEMOGLOBIN GLYCOSYLATED A1C: CPT | Performed by: PHYSICIAN ASSISTANT

## 2022-11-30 PROCEDURE — 85027 COMPLETE CBC AUTOMATED: CPT | Performed by: PHYSICIAN ASSISTANT

## 2022-11-30 RX ORDER — ATORVASTATIN CALCIUM 40 MG/1
40 TABLET, FILM COATED ORAL DAILY
Qty: 90 TABLET | Refills: 3 | Status: SHIPPED | OUTPATIENT
Start: 2022-11-30 | End: 2023-12-11

## 2022-11-30 RX ORDER — LOSARTAN POTASSIUM 100 MG/1
100 TABLET ORAL DAILY
Qty: 90 TABLET | Refills: 3 | Status: SHIPPED | OUTPATIENT
Start: 2022-11-30 | End: 2023-11-27

## 2022-11-30 ASSESSMENT — ENCOUNTER SYMPTOMS
WEAKNESS: 0
NUMBNESS: 0
DIARRHEA: 0
NAUSEA: 0
FATIGUE: 0
UNEXPECTED WEIGHT CHANGE: 0
BACK PAIN: 1
CHILLS: 0
CONSTIPATION: 0
CHEST TIGHTNESS: 0
EYES NEGATIVE: 1
ABDOMINAL PAIN: 0
PSYCHIATRIC NEGATIVE: 1
LIGHT-HEADEDNESS: 0
HEADACHES: 0
COUGH: 0
FEVER: 0
MYALGIAS: 1
NECK PAIN: 0
VOMITING: 0
SHORTNESS OF BREATH: 0
DIZZINESS: 0
HEARTBURN: 1

## 2022-11-30 ASSESSMENT — PAIN SCALES - GENERAL: PAINLEVEL: NO PAIN (0)

## 2022-11-30 NOTE — PROGRESS NOTES
Assessment & Plan       ICD-10-CM    1. Gastroesophageal reflux disease with esophagitis without hemorrhage  K21.00 CBC with platelets     omeprazole (PRILOSEC) 20 MG DR capsule     CBC with platelets      2. Hyperlipidemia, unspecified hyperlipidemia type  E78.5 Lipid panel reflex to direct LDL Fasting     atorvastatin (LIPITOR) 40 MG tablet     Lipid panel reflex to direct LDL Fasting      3. Diabetes mellitus type 2, noninsulin dependent (H)  E11.9 Comprehensive metabolic panel (BMP + Alb, Alk Phos, ALT, AST, Total. Bili, TP)     Hemoglobin A1c     Albumin Random Urine Quantitative with Creat Ratio     Comprehensive metabolic panel (BMP + Alb, Alk Phos, ALT, AST, Total. Bili, TP)     Hemoglobin A1c     Albumin Random Urine Quantitative with Creat Ratio      4. Benign essential hypertension  I10 Comprehensive metabolic panel (BMP + Alb, Alk Phos, ALT, AST, Total. Bili, TP)     losartan (COZAAR) 100 MG tablet     Comprehensive metabolic panel (BMP + Alb, Alk Phos, ALT, AST, Total. Bili, TP)      5. Male Erectile Disorder  F52.8       6. Back pain, unspecified back location, unspecified back pain laterality, unspecified chronicity  M54.9       7. Bilateral carpal tunnel syndrome  G56.03         #1 GERD  Currently takes omeprazole for reflux.  Currently well managed we will continue.    #2 hyperlipidemia  Last lipid panel 9/30/2021-cholesterol 129, triglycerides 98, HDL 41, LDL 68.  Currently well managed on atorvastatin 40 mg with no side effects.  We will continue this medication.  Will obtain another lipid panel today.    #3 diabetes type 2  Last A1c 5/18/2022-6.6  Currently managed without medication via lifestyle and diet.  He is currently watching his sugar intake and carb intake.  He is more aware of what he is eating.  Asymptomatic.  Recheck A1c today and urine microalbumin.    #4 hypertension  Blood pressure in clinic today 132/88.  Currently managed on losartan.  Asymptomatic.  Stable.    #5 ED  No  "concerns.  Stable.    #6 back pain  Complaint of on and off lower back pain for many years.  Tylenol does help.  He had no injury to the area.  Walking makes it worse.  Describes it more as a dull ache and seems more muscle related.  No bony tenderness on physical exam negative bilateral straight leg raise test.  Not having any bowel or bladder dysfunction.  Recommended applying heating packs to the area, taking Tylenol, Voltaren gel, or lidocaine patches.  Did discuss referral to physical therapy and he is willing to do this sometime in the future when he has more available.  Discussed if physical therapy does not help in the future that a possible MRI might be needed to see if there is anything else going on.  Patient was agreeable to this plan.    #7 carpal tunnel syndrome  Bilateral carpal tunnel syndrome previous surgery on right hand for release.  Tolerated well and had moderate relief.  Scheduled for left hand carpal tunnel syndrome December 19 has numbness and tingling into first 4 fingers worse in the thumb.    BMI:   Estimated body mass index is 27.74 kg/m  as calculated from the following:    Height as of this encounter: 1.892 m (6' 2.5\").    Weight as of this encounter: 99.3 kg (219 lb).       No follow-ups on file.   Follow-up Visit   Expected date:  May 30, 2023 (Approximate)      Follow Up Appointment Details:     Follow-up with whom?: Me    Follow-Up for what?: Medicare Wellness    Welcome or Annual?: Annual Wellness    How?: In Person    Is this an as-needed follow-up?: Yes                    ARANZA [facility]    Tyrone Redmond is a 77 year old, presenting for the following health issues:  Establish Care and Recheck Medication      General this is a pleasant 77-year-old male presenting to the clinic today to establish care/med check.  He has a past medical history of GERD, hyperlipidemia, type 2 diabetes, hypertension, and ED.  He is currently taking " "Lipitor, losartan, and omeprazole.  He is due for CBC, CMP, lipid panel, A1c, and urine albumin.  He does have an acute concern of some back pain that has been on and off for many years now.  He has no injury to the area.  Not have any bowel or bladder issues.  He also has carpal tunnel surgery for his left hand coming up on December 19.  Due for an annual wellness visit in May 2023    History of Present Illness       Reason for visit:  Check up    He eats 0-1 servings of fruits and vegetables daily.He consumes 1 sweetened beverage(s) daily.He exercises with enough effort to increase his heart rate 30 to 60 minutes per day.  He exercises with enough effort to increase his heart rate 3 or less days per week.   He is taking medications regularly.             Review of Systems   Constitutional: Negative for chills, fatigue, fever and unexpected weight change.   HENT: Negative.    Eyes: Negative.    Respiratory: Negative for cough, chest tightness and shortness of breath.    Cardiovascular: Negative for chest pain and peripheral edema.   Gastrointestinal: Positive for heartburn. Negative for abdominal pain, constipation, diarrhea, nausea and vomiting.   Musculoskeletal: Positive for back pain and myalgias. Negative for neck pain.   Skin: Negative for rash.   Neurological: Negative for dizziness, weakness, light-headedness, numbness and headaches.   Psychiatric/Behavioral: Negative.             Objective    /88   Pulse 59   Temp 97.7  F (36.5  C)   Resp 16   Ht 1.892 m (6' 2.5\")   Wt 99.3 kg (219 lb)   SpO2 97%   BMI 27.74 kg/m    Body mass index is 27.74 kg/m .  Physical Exam  Vitals reviewed.   Constitutional:       Appearance: Normal appearance.   HENT:      Head: Normocephalic and atraumatic.   Eyes:      Conjunctiva/sclera: Conjunctivae normal.   Cardiovascular:      Rate and Rhythm: Normal rate and regular rhythm.      Heart sounds: Normal heart sounds, S1 normal and S2 normal. Heart sounds not " distant. No murmur heard.    No friction rub. No gallop.   Pulmonary:      Effort: Pulmonary effort is normal.      Breath sounds: Normal breath sounds and air entry. No decreased breath sounds, wheezing, rhonchi or rales.   Musculoskeletal:      Cervical back: Normal and neck supple.      Thoracic back: Normal.      Lumbar back: No deformity, signs of trauma, tenderness or bony tenderness. Negative right straight leg raise test and negative left straight leg raise test.   Skin:     General: Skin is warm and dry.   Neurological:      Mental Status: He is alert.      GCS: GCS eye subscore is 4. GCS verbal subscore is 5. GCS motor subscore is 6.   Psychiatric:         Mood and Affect: Mood normal.         Thought Content: Thought content normal.         Judgment: Judgment normal.

## 2022-11-30 NOTE — LETTER
December 6, 2022      Ruy Gibbs  [address] [address]        Dear ,    We are writing to inform you of your test results.    A1c is stable at 6.8 up from 6.6. Continue to watch diet for now. Increases activity.   Lets plan on rechecking this in 6 months. Please make an appt for 6 months. If this continue to increases we may need to start medication to help. Cholesterol looks stable. No changes to medications.       Resulted Orders   CBC with platelets   Result Value Ref Range    WBC Count 6.6 4.0 - 11.0 10e3/uL    RBC Count 4.98 4.40 - 5.90 10e6/uL    Hemoglobin 15.1 13.3 - 17.7 g/dL    Hematocrit 46.3 40.0 - 53.0 %    MCV 93 78 - 100 fL    MCH 30.3 26.5 - 33.0 pg    MCHC 32.6 31.5 - 36.5 g/dL    RDW 12.7 10.0 - 15.0 %    Platelet Count 188 150 - 450 10e3/uL   Comprehensive metabolic panel (BMP + Alb, Alk Phos, ALT, AST, Total. Bili, TP)   Result Value Ref Range    Sodium 142 136 - 145 mmol/L    Potassium 4.4 3.4 - 5.3 mmol/L    Chloride 106 98 - 107 mmol/L    Carbon Dioxide (CO2) 24 22 - 29 mmol/L    Anion Gap 12 7 - 15 mmol/L    Urea Nitrogen 18.2 8.0 - 23.0 mg/dL    Creatinine 0.80 0.67 - 1.17 mg/dL    Calcium 9.4 8.8 - 10.2 mg/dL    Glucose 131 (H) 70 - 99 mg/dL    Alkaline Phosphatase 72 40 - 129 U/L    AST 20 10 - 50 U/L    ALT 16 10 - 50 U/L    Protein Total 6.8 6.4 - 8.3 g/dL    Albumin 4.5 3.5 - 5.2 g/dL    Bilirubin Total 1.0 <=1.2 mg/dL    GFR Estimate >90 >60 mL/min/1.73m2      Comment:      Effective December 21, 2021 eGFRcr in adults is calculated using the 2021 CKD-EPI creatinine equation which includes age and gender (Radha brar al., NEJM, DOI: 10.1056/KUYOzf9207308)   Lipid panel reflex to direct LDL Fasting   Result Value Ref Range    Cholesterol 126 <200 mg/dL    Triglycerides 105 <150 mg/dL    Direct Measure HDL 41 >=40 mg/dL    LDL Cholesterol Calculated 64 <=100 mg/dL    Non HDL Cholesterol 85 <130 mg/dL    Narrative    Cholesterol  Desirable:  <200  mg/dL    Triglycerides  Normal:  Less than 150 mg/dL  Borderline High:  150-199 mg/dL  High:  200-499 mg/dL  Very High:  Greater than or equal to 500 mg/dL    Direct Measure HDL  Female:  Greater than or equal to 50 mg/dL   Male:  Greater than or equal to 40 mg/dL    LDL Cholesterol  Desirable:  <100mg/dL  Above Desirable:  100-129 mg/dL   Borderline High:  130-159 mg/dL   High:  160-189 mg/dL   Very High:  >= 190 mg/dL    Non HDL Cholesterol  Desirable:  130 mg/dL  Above Desirable:  130-159 mg/dL  Borderline High:  160-189 mg/dL  High:  190-219 mg/dL  Very High:  Greater than or equal to 220 mg/dL   Hemoglobin A1c   Result Value Ref Range    Hemoglobin A1C 6.8 (H) 0.0 - 5.6 %      Comment:      Normal <5.7%   Prediabetes 5.7-6.4%    Diabetes 6.5% or higher     Note: Adopted from ADA consensus guidelines.       If you have any questions or concerns, please call the clinic at the number listed above.       Sincerely,      Gerald Mota PA-C

## 2022-12-12 ENCOUNTER — OFFICE VISIT (OUTPATIENT)
Dept: FAMILY MEDICINE | Facility: CLINIC | Age: 77
End: 2022-12-12
Payer: COMMERCIAL

## 2022-12-12 VITALS
RESPIRATION RATE: 14 BRPM | OXYGEN SATURATION: 99 % | TEMPERATURE: 98.7 F | HEART RATE: 54 BPM | BODY MASS INDEX: 26.73 KG/M2 | DIASTOLIC BLOOD PRESSURE: 62 MMHG | SYSTOLIC BLOOD PRESSURE: 120 MMHG | WEIGHT: 215 LBS | HEIGHT: 75 IN

## 2022-12-12 DIAGNOSIS — E78.5 HYPERLIPIDEMIA, UNSPECIFIED HYPERLIPIDEMIA TYPE: ICD-10-CM

## 2022-12-12 DIAGNOSIS — G56.03 BILATERAL CARPAL TUNNEL SYNDROME: ICD-10-CM

## 2022-12-12 DIAGNOSIS — E11.9 DIABETES MELLITUS TYPE 2, NONINSULIN DEPENDENT (H): ICD-10-CM

## 2022-12-12 DIAGNOSIS — K21.00 GASTROESOPHAGEAL REFLUX DISEASE WITH ESOPHAGITIS WITHOUT HEMORRHAGE: ICD-10-CM

## 2022-12-12 DIAGNOSIS — Z01.818 PRE-OP EXAM: Primary | ICD-10-CM

## 2022-12-12 DIAGNOSIS — F52.8 OTHER SEXUAL DYSFUNCTION NOT DUE TO A SUBSTANCE OR KNOWN PHYSIOLOGICAL CONDITION: ICD-10-CM

## 2022-12-12 DIAGNOSIS — I10 BENIGN ESSENTIAL HYPERTENSION: ICD-10-CM

## 2022-12-12 PROCEDURE — 93005 ELECTROCARDIOGRAM TRACING: CPT | Performed by: PHYSICIAN ASSISTANT

## 2022-12-12 PROCEDURE — 93010 ELECTROCARDIOGRAM REPORT: CPT | Performed by: INTERNAL MEDICINE

## 2022-12-12 PROCEDURE — 99214 OFFICE O/P EST MOD 30 MIN: CPT | Performed by: PHYSICIAN ASSISTANT

## 2022-12-12 ASSESSMENT — ENCOUNTER SYMPTOMS
CONSTIPATION: 0
DIARRHEA: 0
ABDOMINAL PAIN: 0
DIZZINESS: 0
LIGHT-HEADEDNESS: 0
CHILLS: 0
VOMITING: 0
FREQUENCY: 0
SHORTNESS OF BREATH: 0
ACTIVITY CHANGE: 0
COUGH: 0
PALPITATIONS: 0
SORE THROAT: 0
HEADACHES: 0
FLANK PAIN: 0
SINUS PAIN: 0
WHEEZING: 0
FATIGUE: 0
NAUSEA: 0
EYES NEGATIVE: 1
RHINORRHEA: 0

## 2022-12-12 NOTE — PROGRESS NOTES
[facility]  [address]  [place] [address]  Phone: [phone]  Fax: [phone]  Primary Provider: Stef Menezes  Pre-op Performing Provider: STEF MENEZES    PREOPERATIVE EVALUATION:  Today's date: 12/12/2022    Ruy Gibbs is a 77 year old male who presents for a preoperative evaluation.    Surgical Information:  Surgery/Procedure: L carpel tunnel repair  Surgery Location: [facility]  Surgeon: Dr. Brambila  Surgery Date: 12/19/22  Time of Surgery: TBD  Where patient plans to recover: At home with family  Fax number for surgical facility: [phone]    Type of Anesthesia Anticipated: General    Assessment & Plan     The proposed surgical procedure is considered LOW risk.    ICD-10-CM    1. Pre-op exam  Z01.818 EKG 12-lead, tracing only      2. Hyperlipidemia, unspecified hyperlipidemia type  E78.5       3. Diabetes mellitus type 2, noninsulin dependent (H)  E11.9       4. Benign essential hypertension  I10       5. Gastroesophageal reflux disease with esophagitis without hemorrhage  K21.00       6. Male Erectile Disorder  F52.8       7. Bilateral carpal tunnel syndrome  G56.03         #1 preop physical-she will be undergoing left carpal tunnel release surgery on 12/19/2022 at [facility].  Labs updated 11/30/2022.  Preop COVID test order placed if needed.    #2 right carpal tunnel-she will be undergoing carpal tunnel release on 12/19/2022.  He is to hold all NSAIDs and multivitamins and supplements a week prior to his procedure.    #3 hypertension-blood pressure is 120/62 today.  Currently on losartan 100 mg daily.    #4 hyperlipidemia-last lipid panel showed a stable lipid with an LDL of 64.  He is currently on atorvastatin 40 mg daily.  Tolerating the medication well.  Stable    #5 type 2 diabetes-A1c 6.8 1122.  Diet controlled.  This is up from 6.5.  I did recommend watching his diet and stay more active as he  has not been doing this.  We will recheck this in June.    #6 GERD-currently taking omeprazole symptoms are well controlled.        Risks and Recommendations:  The patient has the following additional risks and recommendations for perioperative complications:   - No identified additional risk factors other than previously addressed    Medication Instructions:  Patient is on no chronic medications    RECOMMENDATION:  APPROVAL GIVEN to proceed with proposed procedure, without further diagnostic evaluation.            Subjective     HPI related to upcoming procedure: The patient is a pleasant 77-year-old male that presents to the clinic today for preop physical.  He will be undergoing left carpal tunnel release surgery on 12/19/2022 with [facility].  He has been having some numbness and tingling mostly in his thumb for some time.  He did undergo right carpal tunnel release about a year or so back and did have for good results.  He is feeling well otherwise.  No chest pain, shortness of breath, lightheaded or dizziness.  No abdominal pains.  He did have labs 11/30/2022 things remain stable.  Unsure if he needs a preop COVID test but this was placed.  He is to call the surgical center and see if he needs this prior to his procedure.    Preop Questions 12/12/2022   1. Have you ever had a heart attack or stroke? No   2. Have you ever had surgery on your heart or blood vessels, such as a stent placement, a coronary artery bypass, or surgery on an artery in your head, neck, heart, or legs? No   3. Do you have chest pain with activity? No   4. Do you have a history of  heart failure? No   5. Do you currently have a cold, bronchitis or symptoms of other infection? No   6. Do you have a cough, shortness of breath, or wheezing? No   7. Do you or anyone in your family have previous history of blood clots? No   8. Do you or does anyone in your family have a serious bleeding problem such as prolonged bleeding following surgeries or cuts?  No   9. Have you ever had problems with anemia or been told to take iron pills? No   10. Have you had any abnormal blood loss such as black, tarry or bloody stools? No   11. Have you ever had a blood transfusion? No   12. Are you willing to have a blood transfusion if it is medically needed before, during, or after your surgery? Yes   13. Have you or any of your relatives ever had problems with anesthesia? No   14. Do you have sleep apnea, excessive snoring or daytime drowsiness? No   15. Do you have any artifical heart valves or other implanted medical devices like a pacemaker, defibrillator, or continuous glucose monitor? No   16. Do you have artificial joints? No   17. Are you allergic to latex? No       Health Care Directive:  Patient does not have a Health Care Directive or Living Will: Discussed advance care planning with patient; however, patient declined at this time.    Preoperative Review of :   reviewed - no record of controlled substances prescribed.        Review of Systems   Constitutional: Negative for activity change, chills and fatigue.   HENT: Negative for congestion, ear discharge, ear pain, postnasal drip, rhinorrhea, sinus pain and sore throat.    Eyes: Negative.    Respiratory: Negative for cough, shortness of breath and wheezing.    Cardiovascular: Negative for chest pain and palpitations.   Gastrointestinal: Negative for abdominal pain, constipation, diarrhea, nausea and vomiting.   Genitourinary: Negative for flank pain, frequency, penile discharge and penile pain.   Skin: Negative for rash.   Neurological: Negative for dizziness, light-headedness and headaches.       Patient Active Problem List    Diagnosis Date Noted     Benign essential hypertension 12/12/2018     Priority: Medium     Diabetes mellitus type 2, noninsulin dependent (H) 02/07/2018     Priority: Medium     A1C 6.6% see note         Hyperlipidemia      Priority: Medium     Created by Conversion         Chronic Reflux  Esophagitis      Priority: Medium     Created by Conversion         Male Erectile Disorder      Priority: Medium     Created by Conversion          Past Medical History:   Diagnosis Date     Benign essential hypertension 2018     Diabetes mellitus type 2, noninsulin dependent (H) 2018     Hyperlipemia      Reflux esophagitis      Past Surgical History:   Procedure Laterality Date     CHOLECYSTECTOMY  2014    dr. Way at [facility]     HC REMOVAL OF TONSILS,<13 Y/O      Description: Tonsillectomy;  Recorded: 2008;     INGUINAL HERNIA REPAIR Left 2016    Procedure: LEFT INGUINAL HERNIA REPAIR;  Surgeon: Drew Way MD;  [facility]: [facility];  Service:      [facility] H[facility]ORY [facility]     TONSILLECTOMY       Current Outpatient Medications   Medication Sig Dispense Refill     atorvastatin (LIPITOR) 40 MG tablet Take 1 tablet (40 mg) by mouth daily 90 tablet 3     Chondroitin Sulfate 400 MG CAPS Take 1,200 mg by mouth       Glucos-MSM-C-Ec-Pfeyep-Egsnpv (GLUCOSAMINE MSM COMPLEX) TABS tablet Take by mouth daily       losartan (COZAAR) 100 MG tablet Take 1 tablet (100 mg) by mouth daily 90 tablet 3     multivitamin therapeutic (THERAGRAN) tablet [MULTIVITAMIN THERAPEUTIC (THERAGRAN) TABLET] Take 1 tablet by mouth daily.       Naproxen Sodium (ALEVE PO)        omeprazole (PRILOSEC) 20 MG DR capsule Take 1 capsule (20 mg) by mouth every other day Take first thing in AM on empty stomach 90 capsule 3       No Known Allergies     Social History     Tobacco Use     Smoking status: Former     Types: Cigarettes     Quit date: 1969     Years since quittin.9     Passive exposure: Never     Smokeless tobacco: Never   Substance Use Topics     Alcohol use: Yes     Alcohol/week: 1.0 standard drink     Comment: Alcoholic Drinks/day: occassional       History   Drug Use No  "        Objective     /62 (BP Location: Left arm, Patient Position: Sitting, Cuff Size: Adult Regular)   Pulse 54   Temp 98.7  F (37.1  C) (Oral)   Resp 14   Ht 1.895 m (6' 2.61\")   Wt 97.5 kg (215 lb)   SpO2 99%   BMI 27.16 kg/m      Physical Exam  Vitals and nursing note reviewed.   Constitutional:       General: He is not in acute distress.     Appearance: Normal appearance. He is not ill-appearing, toxic-appearing or diaphoretic.   HENT:      Head: Normocephalic and atraumatic.      Right Ear: Tympanic membrane, ear canal and external ear normal.      Left Ear: Tympanic membrane, ear canal and external ear normal.      Mouth/Throat:      Mouth: Mucous membranes are moist.      Pharynx: No oropharyngeal exudate or posterior oropharyngeal erythema.   Eyes:      General:         Right eye: No discharge.         Left eye: No discharge.      Conjunctiva/sclera: Conjunctivae normal.      Pupils: Pupils are equal, round, and reactive to light.   Cardiovascular:      Rate and Rhythm: Normal rate and regular rhythm.      Heart sounds: No murmur heard.    No friction rub. No gallop.   Pulmonary:      Effort: Pulmonary effort is normal.      Breath sounds: No wheezing or rhonchi.   Abdominal:      General: Bowel sounds are normal.      Tenderness: There is no abdominal tenderness. There is no right CVA tenderness, left CVA tenderness, guarding or rebound.   Skin:     General: Skin is warm.      Capillary Refill: Capillary refill takes less than 2 seconds.      Findings: No lesion or rash.   Neurological:      General: No focal deficit present.      Mental Status: He is alert and oriented to person, place, and time.      Cranial Nerves: Cranial nerves 2-12 are intact. No cranial nerve deficit.      Motor: No weakness.      Coordination: Coordination is intact.      Gait: Gait normal.      Deep Tendon Reflexes:      Reflex Scores:       Patellar reflexes are 2+ on the right side and 2+ on the left " side.        Recent Results (from the past 720 hour(s))   CBC with platelets    Collection Time: 11/30/22  9:59 AM   Result Value Ref Range    WBC Count 6.6 4.0 - 11.0 10e3/uL    RBC Count 4.98 4.40 - 5.90 10e6/uL    Hemoglobin 15.1 13.3 - 17.7 g/dL    Hematocrit 46.3 40.0 - 53.0 %    MCV 93 78 - 100 fL    MCH 30.3 26.5 - 33.0 pg    MCHC 32.6 31.5 - 36.5 g/dL    RDW 12.7 10.0 - 15.0 %    Platelet Count 188 150 - 450 10e3/uL   Comprehensive metabolic panel (BMP + Alb, Alk Phos, ALT, AST, Total. Bili, TP)    Collection Time: 11/30/22  9:59 AM   Result Value Ref Range    Sodium 142 136 - 145 mmol/L    Potassium 4.4 3.4 - 5.3 mmol/L    Chloride 106 98 - 107 mmol/L    Carbon Dioxide (CO2) 24 22 - 29 mmol/L    Anion Gap 12 7 - 15 mmol/L    Urea Nitrogen 18.2 8.0 - 23.0 mg/dL    Creatinine 0.80 0.67 - 1.17 mg/dL    Calcium 9.4 8.8 - 10.2 mg/dL    Glucose 131 (H) 70 - 99 mg/dL    Alkaline Phosphatase 72 40 - 129 U/L    AST 20 10 - 50 U/L    ALT 16 10 - 50 U/L    Protein Total 6.8 6.4 - 8.3 g/dL    Albumin 4.5 3.5 - 5.2 g/dL    Bilirubin Total 1.0 <=1.2 mg/dL    GFR Estimate >90 >60 mL/min/1.73m2   Lipid panel reflex to direct LDL Fasting    Collection Time: 11/30/22  9:59 AM   Result Value Ref Range    Cholesterol 126 <200 mg/dL    Triglycerides 105 <150 mg/dL    Direct Measure HDL 41 >=40 mg/dL    LDL Cholesterol Calculated 64 <=100 mg/dL    Non HDL Cholesterol 85 <130 mg/dL   Hemoglobin A1c    Collection Time: 11/30/22  9:59 AM   Result Value Ref Range    Hemoglobin A1C 6.8 (H) 0.0 - 5.6 %   Albumin Random Urine Quantitative with Creat Ratio    Collection Time: 11/30/22 10:01 AM   Result Value Ref Range    Albumin Urine mg/L 31.2 mg/L    Albumin Urine mg/g Cr 48.90 (H) 0.00 - 17.00 mg/g Cr    Creatinine Urine mg/dL 63.8 mg/dL   EKG 12-lead, tracing only    Collection Time: 12/12/22  3:34 PM   Result Value Ref Range    Systolic Blood Pressure  mmHg    Diastolic Blood Pressure  mmHg    Ventricular Rate 56 BPM    Atrial Rate  56 BPM    PA Interval  ms    QRS Duration 96 ms     ms    QTc 440 ms    P Axis  degrees    R AXIS 48 degrees    T Axis 80 degrees    Interpretation ECG       Sinus bradycardia with Premature supraventricular complexes  Low voltage QRS  Borderline ECG  When compared with ECG of 06-FEB-2018 13:32,  Premature supraventricular complexes are now Present       Recent Labs   Lab Test 11/30/22  0959 05/18/22  1335 09/30/21  1236 04/15/21  1352   HGB 15.1  --   --  15.8     --   --  166    141   < > 143   POTASSIUM 4.4 4.3   < > 4.2   CR 0.80 0.80   < > 0.86   A1C 6.8* 6.6*   < > 6.6*    < > = values in this interval not displayed.      Recent Results (from the past 240 hour(s))   EKG 12-lead, tracing only    Collection Time: 12/12/22  3:34 PM   Result Value Ref Range    Systolic Blood Pressure  mmHg    Diastolic Blood Pressure  mmHg    Ventricular Rate 56 BPM    Atrial Rate 56 BPM    PA Interval  ms    QRS Duration 96 ms     ms    QTc 440 ms    P Axis  degrees    R AXIS 48 degrees    T Axis 80 degrees    Interpretation ECG       Sinus bradycardia with Premature supraventricular complexes  Low voltage QRS  Borderline ECG  When compared with ECG of 06-FEB-2018 13:32,  Premature supraventricular complexes are now Present       Diagnostics:  No labs were ordered during this visit.       Revised Cardiac Risk Index (RCRI):  The patient has the following serious cardiovascular risks for perioperative complications:   - No serious cardiac risks = 0 points     RCRI Interpretation: 0 points: Class I (very low risk - 0.4% complication rate)    Stovall Score > 4 mets       Signed Electronically by: Gerald Mota PA-C  Copy of this evaluation report is provided to requesting physician.

## 2022-12-13 LAB
ATRIAL RATE - MUSE: 56 BPM
DIASTOLIC BLOOD PRESSURE - MUSE: NORMAL MMHG
INTERPRETATION ECG - MUSE: NORMAL
P AXIS - MUSE: NORMAL DEGREES
PR INTERVAL - MUSE: NORMAL MS
QRS DURATION - MUSE: 96 MS
QT - MUSE: 456 MS
QTC - MUSE: 440 MS
R AXIS - MUSE: 48 DEGREES
SYSTOLIC BLOOD PRESSURE - MUSE: NORMAL MMHG
T AXIS - MUSE: 80 DEGREES
VENTRICULAR RATE- MUSE: 56 BPM

## 2023-04-20 ENCOUNTER — PATIENT OUTREACH (OUTPATIENT)
Dept: CARE COORDINATION | Facility: CLINIC | Age: 78
End: 2023-04-20
Payer: COMMERCIAL

## 2023-05-18 ENCOUNTER — TRANSFERRED RECORDS (OUTPATIENT)
Dept: HEALTH INFORMATION MANAGEMENT | Facility: CLINIC | Age: 78
End: 2023-05-18
Payer: COMMERCIAL

## 2023-05-30 ENCOUNTER — OFFICE VISIT (OUTPATIENT)
Dept: FAMILY MEDICINE | Facility: CLINIC | Age: 78
End: 2023-05-30
Payer: COMMERCIAL

## 2023-05-30 VITALS
WEIGHT: 214 LBS | HEART RATE: 55 BPM | DIASTOLIC BLOOD PRESSURE: 68 MMHG | SYSTOLIC BLOOD PRESSURE: 118 MMHG | BODY MASS INDEX: 27.46 KG/M2 | OXYGEN SATURATION: 98 % | RESPIRATION RATE: 12 BRPM | HEIGHT: 74 IN | TEMPERATURE: 98.1 F

## 2023-05-30 DIAGNOSIS — Z00.00 MEDICARE ANNUAL WELLNESS VISIT, SUBSEQUENT: Primary | ICD-10-CM

## 2023-05-30 DIAGNOSIS — Z12.11 COLON CANCER SCREENING: ICD-10-CM

## 2023-05-30 DIAGNOSIS — F52.8 OTHER SEXUAL DYSFUNCTION NOT DUE TO A SUBSTANCE OR KNOWN PHYSIOLOGICAL CONDITION: ICD-10-CM

## 2023-05-30 DIAGNOSIS — K21.00 GASTROESOPHAGEAL REFLUX DISEASE WITH ESOPHAGITIS WITHOUT HEMORRHAGE: ICD-10-CM

## 2023-05-30 DIAGNOSIS — E11.9 DIABETES MELLITUS TYPE 2, NONINSULIN DEPENDENT (H): ICD-10-CM

## 2023-05-30 DIAGNOSIS — I10 BENIGN ESSENTIAL HYPERTENSION: ICD-10-CM

## 2023-05-30 DIAGNOSIS — Z12.5 SCREENING FOR PROSTATE CANCER: ICD-10-CM

## 2023-05-30 DIAGNOSIS — E78.5 HYPERLIPIDEMIA, UNSPECIFIED HYPERLIPIDEMIA TYPE: ICD-10-CM

## 2023-05-30 DIAGNOSIS — M54.9 BACK PAIN, UNSPECIFIED BACK LOCATION, UNSPECIFIED BACK PAIN LATERALITY, UNSPECIFIED CHRONICITY: ICD-10-CM

## 2023-05-30 LAB
ANION GAP SERPL CALCULATED.3IONS-SCNC: 13 MMOL/L (ref 7–15)
BUN SERPL-MCNC: 24 MG/DL (ref 8–23)
CALCIUM SERPL-MCNC: 9.8 MG/DL (ref 8.8–10.2)
CHLORIDE SERPL-SCNC: 106 MMOL/L (ref 98–107)
CREAT SERPL-MCNC: 0.76 MG/DL (ref 0.67–1.17)
DEPRECATED HCO3 PLAS-SCNC: 23 MMOL/L (ref 22–29)
GFR SERPL CREATININE-BSD FRML MDRD: >90 ML/MIN/1.73M2
GLUCOSE SERPL-MCNC: 143 MG/DL (ref 70–99)
HBA1C MFR BLD: 6.8 % (ref 0–5.6)
POTASSIUM SERPL-SCNC: 4.1 MMOL/L (ref 3.4–5.3)
SODIUM SERPL-SCNC: 142 MMOL/L (ref 136–145)

## 2023-05-30 PROCEDURE — 80048 BASIC METABOLIC PNL TOTAL CA: CPT | Performed by: PHYSICIAN ASSISTANT

## 2023-05-30 PROCEDURE — 83036 HEMOGLOBIN GLYCOSYLATED A1C: CPT | Performed by: PHYSICIAN ASSISTANT

## 2023-05-30 PROCEDURE — 91312 COVID-19 BIVALENT 12+ (PFIZER): CPT | Performed by: PHYSICIAN ASSISTANT

## 2023-05-30 PROCEDURE — 99214 OFFICE O/P EST MOD 30 MIN: CPT | Mod: 25 | Performed by: PHYSICIAN ASSISTANT

## 2023-05-30 PROCEDURE — G0439 PPPS, SUBSEQ VISIT: HCPCS | Performed by: PHYSICIAN ASSISTANT

## 2023-05-30 PROCEDURE — 0124A COVID-19 BIVALENT 12+ (PFIZER): CPT | Performed by: PHYSICIAN ASSISTANT

## 2023-05-30 PROCEDURE — 36415 COLL VENOUS BLD VENIPUNCTURE: CPT | Performed by: PHYSICIAN ASSISTANT

## 2023-05-30 RX ORDER — CYCLOBENZAPRINE HCL 5 MG
5 TABLET ORAL 3 TIMES DAILY PRN
Qty: 30 TABLET | Refills: 1 | Status: SHIPPED | OUTPATIENT
Start: 2023-05-30 | End: 2024-01-10

## 2023-05-30 ASSESSMENT — ENCOUNTER SYMPTOMS
SORE THROAT: 0
HEMATURIA: 0
ARTHRALGIAS: 1
FEVER: 0
JOINT SWELLING: 0
MYALGIAS: 1
FREQUENCY: 0
SHORTNESS OF BREATH: 0
HEADACHES: 0
COUGH: 0
NAUSEA: 0
PALPITATIONS: 0
ABDOMINAL PAIN: 0
DIARRHEA: 0
HEARTBURN: 0
NERVOUS/ANXIOUS: 0
CONSTIPATION: 0
HEMATOCHEZIA: 0
DYSURIA: 0
DIZZINESS: 0
WEAKNESS: 0
PARESTHESIAS: 0
EYE PAIN: 0
CHILLS: 0

## 2023-05-30 ASSESSMENT — ACTIVITIES OF DAILY LIVING (ADL): CURRENT_FUNCTION: NO ASSISTANCE NEEDED

## 2023-05-30 NOTE — PROGRESS NOTES
"SUBJECTIVE:   Ruy is a 78 year old who presents for Preventive Visit.      5/30/2023     8:09 AM   Additional Questions   Roomed by Muna Castrejon   Accompanied by none     Patient has been advised of split billing requirements and indicates understanding: Yes  Are you in the first 12 months of your Medicare coverage?  No    Ruy is a pleasant 78-year-old male who presents to the clinic today for a Medicare annual wellness visit.  Past medical history significant for hypertension, hyperlipidemia, type 2 diabetes, erectile dysfunction, GERD, and chronic lower back pain.    He is currently on losartan 100 mg daily, Lipitor 40, and omeprazole.    Last visit in November 2022 we did a A1c which was elevated at 6.8.  Continues to watch his diet and stay active.    He does have chronic lower back pain for many years.  He states it more as a stiffness in his lower back bilaterally.  He denies any radiculopathy.  He states it comes and goes.  Some days are better than others.  Standing for long periods of time make it worse.  No recent falls or injuries.  He has had muscle relaxer in the past which was helpful.        Healthy Habits:     In general, how would you rate your overall health?  Good    Frequency of exercise:  2-3 days/week    Duration of exercise:  15-30 minutes    Do you usually eat at least 4 servings of fruit and vegetables a day, include whole grains    & fiber and avoid regularly eating high fat or \"junk\" foods?  No    Taking medications regularly:  Yes    Medication side effects:  None    Ability to successfully perform activities of daily living:  No assistance needed    Home Safety:  No safety concerns identified    Hearing Impairment:  No hearing concerns    In the past 6 months, have you been bothered by leaking of urine?  No    In general, how would you rate your overall mental or emotional health?  Excellent      PHQ-2 Total Score: 0    Additional concerns today:  No        Have you ever done " Advance Care Planning? (For example, a Health Directive, POLST, or a discussion with a medical provider or your loved ones about your wishes): Yes, advance care planning is on file.      Fall risk  Fallen 2 or more times in the past year?: No  Any fall with injury in the past year?: No  click delete button to remove this line now  Cognitive Screening   1) Repeat 3 items (Leader, Season, Table)    2) Clock draw: NORMAL  3) 3 item recall: Recalls 3 objects  Results: 3 items recalled: COGNITIVE IMPAIRMENT LESS LIKELY    Mini-CogTM Copyright S Angel. Licensed by the author for use in [facility]; reprinted with permission (sokiesha@[facility]). All rights reserved.      Do you have sleep apnea, excessive snoring or daytime drowsiness?: no    Reviewed and updated as needed this visit by clinical staff   Tobacco  Allergies  Meds              Reviewed and updated as needed this visit by Provider                 Social History     Tobacco Use     Smoking status: Former     Types: Cigarettes     Quit date: 1969     Years since quittin.4     Passive exposure: Never     Smokeless tobacco: Never   Vaping Use     Vaping status: Never Used   Substance Use Topics     Alcohol use: Yes     Alcohol/week: 1.0 standard drink of alcohol     Comment: Alcoholic Drinks/day: occassional             2023     8:05 AM   Alcohol Use   Prescreen: >3 drinks/day or >7 drinks/week? No       Current providers sharing in care for this patient include:   Patient Care Team:  Gerald Mota PA-C as PCP - General (Family Medicine)  Gerald Mota PA-C as Assigned PCP    The following health maintenance items are reviewed in Epic and correct as of today:  Health Maintenance   Topic Date Due     DTAP/TDAP/TD IMMUNIZATION (2 - Td or Tdap) 2022     DIABETIC FOOT EXAM  2022     ANNUAL REVIEW OF HM ORDERS  2023     A1C  2023     EYE EXAM  2023     BMP  2023     LIPID  2023     MICROALBUMIN   "11/30/2023     MEDICARE ANNUAL WELLNESS VISIT  05/30/2024     FALL RISK ASSESSMENT  05/30/2024     ADVANCE CARE PLANNING  05/30/2028     HEPATITIS C SCREENING  Completed     PHQ-2 (once per calendar year)  Completed     INFLUENZA VACCINE  Completed     Pneumococcal Vaccine: 65+ Years  Completed     ZOSTER IMMUNIZATION  Completed     COVID-19 Vaccine  Completed     IPV IMMUNIZATION  Aged Out     MENINGITIS IMMUNIZATION  Aged Out     Lab work is in process      Review of Systems   Constitutional: Negative for chills and fever.   HENT: Negative for congestion, hearing loss and sore throat.    Eyes: Negative for pain and visual disturbance.   Respiratory: Negative for cough and shortness of breath.    Cardiovascular: Negative for chest pain, palpitations and peripheral edema.   Gastrointestinal: Negative for abdominal pain, constipation, diarrhea, heartburn, hematochezia and nausea.   Genitourinary: Positive for impotence. Negative for dysuria, frequency, genital sores, hematuria, penile discharge and urgency.   Musculoskeletal: Positive for arthralgias and myalgias. Negative for joint swelling.   Skin: Negative for rash.   Neurological: Negative for dizziness, weakness, headaches and paresthesias.   Psychiatric/Behavioral: Negative for mood changes. The patient is not nervous/anxious.          OBJECTIVE:   /68 (BP Location: Left arm, Patient Position: Sitting, Cuff Size: Adult Regular)   Pulse 55   Temp 98.1  F (36.7  C) (Oral)   Resp 12   Ht 1.88 m (6' 2\")   Wt 97.1 kg (214 lb)   SpO2 98%   BMI 27.48 kg/m   Estimated body mass index is 27.48 kg/m  as calculated from the following:    Height as of this encounter: 1.88 m (6' 2\").    Weight as of this encounter: 97.1 kg (214 lb).  Physical Exam  Vitals and nursing note reviewed.   Constitutional:       General: He is not in acute distress.     Appearance: Normal appearance. He is not ill-appearing, toxic-appearing or diaphoretic.   HENT:      Head: " Normocephalic and atraumatic.   Eyes:      Conjunctiva/sclera: Conjunctivae normal.   Cardiovascular:      Rate and Rhythm: Normal rate and regular rhythm.      Heart sounds: No murmur heard.     No friction rub. No gallop.   Pulmonary:      Effort: Pulmonary effort is normal.      Breath sounds: No wheezing, rhonchi or rales.   Musculoskeletal:      Comments: No cervical, thoracic, or lumbar tenderness with palpation.  No weakness to the lower extremities bilaterally   Skin:     General: Skin is warm and dry.   Neurological:      General: No focal deficit present.      Mental Status: He is alert and oriented to person, place, and time.      Motor: No weakness.   Psychiatric:         Mood and Affect: Mood normal.         Behavior: Behavior normal.         Thought Content: Thought content normal.         Judgment: Judgment normal.         ASSESSMENT / PLAN:       ICD-10-CM    1. Medicare annual wellness visit, subsequent  Z00.00 Basic metabolic panel     Hemoglobin A1c     Basic metabolic panel     Hemoglobin A1c      2. Hyperlipidemia, unspecified hyperlipidemia type  E78.5       3. Diabetes mellitus type 2, noninsulin dependent (H)  E11.9 Hemoglobin A1c     Hemoglobin A1c      4. Benign essential hypertension  I10       5. Gastroesophageal reflux disease with esophagitis without hemorrhage  K21.00       6. Male Erectile Disorder  F52.8       7. Screening for prostate cancer  Z12.5       8. Back pain, unspecified back location, unspecified back pain laterality, unspecified chronicity  M54.9 cyclobenzaprine (FLEXERIL) 5 MG tablet     Physical Therapy Referral      9. Colon cancer screening  Z12.11 Adult GI  Referral - Procedure Only        #1 Medicare annual wellness visit  We will update labs including a A1c and a BMP today.  He will update his COVID-vaccine today.  Last CBC and lipid panel 11/20/2022 was stable.    #2 hypertension  Blood pressure well controlled at 118/68.  Continue losartan 100 mg  daily    #3 hyperlipidemia  Last lipid panel 11/20/2022 was stable with a LDL of 64, triglyceride of 105, HDL of 41.  Continue with Lipitor 40 mg daily.  Last liver function in November 2022 was stable.    #3 type 2 diabetes  Diagnosed with an A1c of 6.8 November 2022.  Not currently on medications.  We did discuss diet and exercise.  Decrease carbohydrates and increase aerobic activity as tolerated.  I recommended diabetic eye exam once a year.  We will check an A1c and a BMP today.  Microalbumin done 11/30/2022 did show proteinuria with a microalbumin of 48.90.  If A1c continues to rise will need to start medications.  We will plan to recheck a microalbumin during his next visit.    #4 GERD  Currently using omeprazole every other day.  Symptoms well controlled at this time    #5 chronic lower back pain  We did discuss treatment options.  He would like to trial Flexeril again.  Side effects of the Flexeril were discussed.  He is not to drive or operate heavy equipment while taking this medication he denies any bowel or bladder dysfunction.  No radiculopathy.  No cervical thoracic or lumbar tenderness with palpation.  Also placed a referral to physical therapy.  If no improvement with physical therapy and muscle relaxer we can consider an a MRI of his lumbar spine for further evaluation or if he develops radiculopathy type symptoms.    #6 colon cancer screening  Last colonoscopy was due in 2019.  He did have multiple polyps at that time.  He is willing to do 1 more colonoscopy at this time.  Amatory referral to [facility] was placed    #7 prostate cancer screening  He does not wish to do any further PSA screenings.  Last PSA in 2021 was stable.  He does not have any urinary symptoms.  No family history of prostate cancer that he is aware of    Patient has been advised of split billing requirements and indicates understanding: Yes      COUNSELING:  Reviewed preventive health counseling, as reflected in patient  "instructions       Regular exercise       Healthy diet/nutrition       Vision screening       Colon cancer screening       Prostate cancer screening      BMI:   Estimated body mass index is 27.48 kg/m  as calculated from the following:    Height as of this encounter: 1.88 m (6' 2\").    Weight as of this encounter: 97.1 kg (214 lb).   Weight management plan: Discussed healthy diet and exercise guidelines      He reports that he quit smoking about 54 years ago. His smoking use included cigarettes. He has never been exposed to tobacco smoke. He has never used smokeless tobacco.      Appropriate preventive services were discussed with this patient, including applicable screening as appropriate for cardiovascular disease, diabetes, osteopenia/osteoporosis, and glaucoma.  As appropriate for age/gender, discussed screening for colorectal cancer, prostate cancer, breast cancer, and cervical cancer. Checklist reviewing preventive services available has been given to the patient.    Reviewed patients plan of care and provided an AVS. The Basic Care Plan (routine screening as documented in Health Maintenance) for Ruy meets the Care Plan requirement. This Care Plan has been established and reviewed with the Patient.          Gerald Mota PA-C  [facility]      "

## 2023-06-22 ENCOUNTER — THERAPY VISIT (OUTPATIENT)
Dept: PHYSICAL THERAPY | Facility: REHABILITATION | Age: 78
End: 2023-06-22
Attending: PHYSICIAN ASSISTANT
Payer: COMMERCIAL

## 2023-06-22 DIAGNOSIS — M54.9 BACK PAIN, UNSPECIFIED BACK LOCATION, UNSPECIFIED BACK PAIN LATERALITY, UNSPECIFIED CHRONICITY: ICD-10-CM

## 2023-06-22 PROCEDURE — 97161 PT EVAL LOW COMPLEX 20 MIN: CPT | Mod: GP

## 2023-06-22 PROCEDURE — 97110 THERAPEUTIC EXERCISES: CPT | Mod: GP

## 2023-06-22 NOTE — PROGRESS NOTES
PHYSICAL THERAPY EVALUATION  Type of Visit: Evaluation    See electronic medical record for Abuse and Falls Screening details.    Subjective       Ruy reports that about 3 years ago he noticed that his back was starting to get stiff frequently. He denies pain, but reports that he has been unable to find a method of relieving the stiffness. He has done pool exercises for a number of years as well stretches with limited relief. He also reports that his wife was in a transitional care unit for six months up until this past April, and he was sitting a lot with her, which he thinks has contributed at all.  He wants to return to [place]. He denies any pain, numbness, or tingling down the legs. He also denies any bowel or bladder changes.  Presenting condition or subjective complaint: Stiffness in lower back that started in 2022  Date of onset: 05/30/23 (Date recorded is date of referral. Patient reports many year history of symptoms.)    Relevant medical history: -- (prediabetes)   Dates & types of surgery: Gall bladder, hernia    Prior diagnostic imaging/testing results:       Prior therapy history for the same diagnosis, illness or injury: No      Prior Level of Function   Transfers: Independent  Ambulation: Independent  ADL: Independent  IADL: independent    Living Environment  Social support: With a significant other or spouse   Type of home: House   Stairs to enter the home: Yes 1 Is there a railing: No   Ramp: Yes   Stairs inside the home: Yes 0 (7 up, 7 down) Is there a railing: Yes   Help at home:    Equipment owned: Walker; Walker with wheels; Standard wheelchair     Employment: No Retired  Hobbies/Interests: Reading, playing cards, watching TV    Patient goals for therapy: More mobility, golf    Pain assessment: Pain denied     Objective   LUMBAR SPINE EVALUATION  PAIN: pain denied  INTEGUMENTARY (edema, incisions):   POSTURE: Standing Posture: Rounded shoulders, Forward head, static trunk and knee  flexion  Sitting Posture: Rounded shoulders, Forward head  GAIT:   Weightbearing Status:   Assistive Device(s): None  Gait Deviations: Base of support increased  Stride length decreased  Chrissy decreased  Trunk flexion  BALANCE/PROPRIOCEPTION: Single Leg Stance Eyes Open (seconds): right - 5 seconds with notable sway; left - 6 seconds with notable sway.  WEIGHTBEARING ALIGNMENT:   NON-WEIGHTBEARING ALIGNMENT:    ROM:  - Flexion: WNL  - Extension: 25% to 50%  - Bilateral side-bending: mid-thigh  - Rotation: right - 50% to 75%; left - 75% to 99%  - Bilateral hip flexion and ER: WNL  - Bilateral hip IR: max limited  - Bilateral knee extension: min limited  PELVIC/SI SCREEN:   STRENGTH: WNL, hip extension: 4/5 bilaterally; core strength: limited (unable to perform supine-to-sit without upper extremity assist)    MYOTOMES: WNL  DTR S:   CORD SIGNS:   DERMATOMES:   NEURAL TENSION: SLR negative bilaterally  FLEXIBILITY: Decreased hip IR L, Decreased piriformis L, Decreased hamstrings L, Decreased hip IR R, Decreased piriformis R, Decreased hamstrings R  LUMBAR/HIP Special Tests:    PELVIS/SI SPECIAL TESTS:   FUNCTIONAL TESTS:   PALPATION: min muscle tension noted in bilateral lumbar paraspinals with no appreciable tenderness; not tension or tenderness noted in posterolateral hips  SPINAL SEGMENTAL CONCLUSIONS: hypomobile to A/P testing throughout lumbar spine      Assessment & Plan   CLINICAL IMPRESSIONS   Medical Diagnosis: Back pain, unspecified back location, unspecified back pain laterality, unspecified chronicity    Treatment Diagnosis:     Impression/Assessment: Patient is a 78 year old male with chronic low back tightness/stiffness complaints.  The following significant findings have been identified: Decreased ROM/flexibility, Decreased joint mobility, Decreased strength, Impaired balance, Impaired gait, Impaired muscle performance and Decreased activity tolerance. These impairments interfere with their ability to  perform self care tasks, recreational activities, household chores, household mobility and community mobility as compared to previous level of function.     Clinical Decision Making (Complexity):   Clinical Presentation: Stable/Uncomplicated  Clinical Presentation Rationale: based on medical and personal factors listed in PT evaluation  Clinical Decision Making (Complexity): Low complexity    PLAN OF CARE  Treatment Interventions:  Interventions: Manual Therapy, Neuromuscular Re-education, Therapeutic Activity, Therapeutic Exercise, Self-Care/Home Management    Long Term Goals     PT Goal 1  Goal Identifier: HEP  Goal Description: Ruy will demonstrate mastery of and compliance with his home exercise program to facilitate increased rate of improvement.  Goal Progress: In progress  Target Date: 07/20/23  PT Goal 2  Goal Identifier: Golf  Goal Description: uRy will demonstrate improved tolerance to desired recreational activities as evidenced by his ability to play at least 9 holes of golf without pain or limitation.  Goal Progress: Unable  Target Date: 08/17/23  PT Goal 3  Goal Identifier: Gait  Goal Description: Ruy will demonstrate improved tolerance to functional mobility as evidenced by his ability to walk for up to 20 minutes with improved upright posture/decreased trunk lean.  Goal Progress: Unable  Target Date: 08/17/23      Frequency of Treatment: 1 time/week  Duration of Treatment: 8 weeks    Recommended Referrals to Other Professionals: none  Education Assessment:   Learner/Method: Patient;Listening;Reading;Demonstration;Pictures/Video;No Barriers to Learning    Risks and benefits of evaluation/treatment have been explained.   Patient/Family/caregiver agrees with Plan of Care.     Evaluation Time:     PT Eval, Low Complexity Minutes (47035): 20    Signing Clinician: Jarred Shannon, PT      [facility]                                                                                    OUTPATIENT PHYSICAL THERAPY      PLAN OF TREATMENT FOR OUTPATIENT REHABILITATION   Patient's Last Name, First Name, Ruy Alvarado YOB: 1945   Provider's Name   [facility]   Medical Record No.  [id number]     Onset Date: 05/30/23 (Date recorded is date of referral. Patient reports many year history of symptoms.)  Start of Care Date: 06/22/23     Medical Diagnosis:  Back pain, unspecified back location, unspecified back pain laterality, unspecified chronicity      PT Treatment Diagnosis:    Plan of Treatment  Frequency/Duration: 1 time/week/ 8 weeks    Certification date from 06/22/23 to 08/17/23         See note for plan of treatment details and functional goals     Jarred Shannon, PT                         I CERTIFY THE NEED FOR THESE SERVICES FURNISHED UNDER        THIS PLAN OF TREATMENT AND WHILE UNDER MY CARE     (Physician attestation of this document indicates review and certification of the therapy plan).                  Referring Provider:  Gerald Mota      Initial Assessment  See Epic Evaluation- Start of Care Date: 06/22/23

## 2023-06-27 ENCOUNTER — TRANSFERRED RECORDS (OUTPATIENT)
Dept: HEALTH INFORMATION MANAGEMENT | Facility: CLINIC | Age: 78
End: 2023-06-27
Payer: COMMERCIAL

## 2023-06-29 ENCOUNTER — THERAPY VISIT (OUTPATIENT)
Dept: PHYSICAL THERAPY | Facility: REHABILITATION | Age: 78
End: 2023-06-29
Attending: PHYSICIAN ASSISTANT
Payer: COMMERCIAL

## 2023-06-29 DIAGNOSIS — M54.9 BACK PAIN, UNSPECIFIED BACK LOCATION, UNSPECIFIED BACK PAIN LATERALITY, UNSPECIFIED CHRONICITY: Primary | ICD-10-CM

## 2023-06-29 PROCEDURE — 97110 THERAPEUTIC EXERCISES: CPT | Mod: GP

## 2023-07-11 ENCOUNTER — THERAPY VISIT (OUTPATIENT)
Dept: PHYSICAL THERAPY | Facility: REHABILITATION | Age: 78
End: 2023-07-11
Payer: COMMERCIAL

## 2023-07-11 DIAGNOSIS — M54.9 BACK PAIN, UNSPECIFIED BACK LOCATION, UNSPECIFIED BACK PAIN LATERALITY, UNSPECIFIED CHRONICITY: Primary | ICD-10-CM

## 2023-07-11 PROCEDURE — 97110 THERAPEUTIC EXERCISES: CPT | Mod: GP

## 2023-07-18 ENCOUNTER — THERAPY VISIT (OUTPATIENT)
Dept: PHYSICAL THERAPY | Facility: REHABILITATION | Age: 78
End: 2023-07-18
Payer: COMMERCIAL

## 2023-07-18 DIAGNOSIS — M54.9 BACK PAIN, UNSPECIFIED BACK LOCATION, UNSPECIFIED BACK PAIN LATERALITY, UNSPECIFIED CHRONICITY: Primary | ICD-10-CM

## 2023-07-18 PROCEDURE — 97110 THERAPEUTIC EXERCISES: CPT | Mod: GP

## 2023-07-18 NOTE — PROGRESS NOTES
PLAN  Continue therapy per current plan of care.    Beginning/End Dates of Progress Note Reporting Period:  06/22/2023  to 07/18/2023    Referring Provider:  Gerald Mota           07/18/23 0500   Appointment Info   Signing clinician's name / credentials Jarred Shannon, PT   Visits Used 4   Medical Diagnosis Back pain, unspecified back location, unspecified back pain laterality, unspecified chronicity   PT Tx Diagnosis Low back and core flexibility, mobility, and strength deficits with associated gait and posture impairments   Progress Note/Certification   Start of Care Date 06/22/23   Onset of illness/injury or Date of Surgery 05/30/23  (Date recorded is date of referral. Patient reports many year history of symptoms.)   Therapy Frequency 1 time/week   Predicted Duration 8 weeks   Certification date from 06/22/23   Certification date to 08/17/23   Progress Note Due Date 08/17/23   Progress Note Completed Date 07/18/23   PT Goal 1   Goal Identifier HEP   Goal Description Ruy will demonstrate mastery of and compliance with his home exercise program to facilitate increased rate of improvement.   Goal Progress In progress: demonstrates mostly good technique, but still benefits from minimal occasional cueing   Target Date 07/20/23   PT Goal 2   Goal Identifier Golf   Goal Description Ruy will demonstrate improved tolerance to desired recreational activities as evidenced by his ability to play at least 9 holes of golf without pain or limitation.   Goal Progress Still unattempted; will trial next week   Target Date 08/17/23   PT Goal 3   Goal Identifier Gait   Goal Description Ruy will demonstrate improved tolerance to functional mobility as evidenced by his ability to walk for up to 20 minutes with improved upright posture/decreased trunk lean.   Goal Progress In progress; still walks with excessive trunk flexion, but improving   Target Date 08/17/23   Subjective Report   Subjective Report Ruy continues  "to report variable tightness, with some days feeling really good and other days feeling more tight.   Objective Measures   Objective Measures Objective Measure 1;Objective Measure 2;Objective Measure 3;Objective Measure 4;Objective Measure 5   Objective Measure 1   Objective Measure Lumbar Extension AROM   Details 75% to 99%   Objective Measure 2   Objective Measure Lumbar Rotation AROM   Details Bilateral: 75% to 99%.   Objective Measure 3   Objective Measure Hip IR PROM   Details Bilateral: mod to max limited   Objective Measure 4   Objective Measure Core Strength   Details Limited: unable to perform supine to sit without upper extremity assist   Objective Measure 5   Objective Measure Hip Extension Strength   Details Bilateral: 4/5   Therapeutic Procedure/Exercise   Therapeutic Procedures: strength, endurance, ROM, flexibillity minutes (28246) 38   Therapeutic Procedures Ther Proc 9;Ther Proc 10   Ther Proc 1 Nustep   Ther Proc 1 - Details 5 minutes (level 5)   Ther Proc 2 Lower trunk rotation   Ther Proc 2 - Details 2 minutes   Ther Proc 3 Objective measures   Ther Proc 3 - Details See objective measures   Ther Proc 4 Standing glut set   Ther Proc 4 - Details 1 x 10 x 10 second holds   Ther Proc 5 Treadmill   Ther Proc 5 - Details Attempted reverse walking   Ther Proc 6 Standing hip flexor stretch   Ther Proc 6 - Details Not performed today   Ther Proc 7 Prone laying   Ther Proc 7 - Details 3 minutes   Ther Proc 8 Standing hip extension   Ther Proc 8 - Details 3 x 12 bilateral   Ther Proc 9 Backwards walking in vasquez for active hip flexor stretch along with lateral stepping for increased trunk extension + hip abductor strengthening   Ther Proc 9 - Details Performed backwards walking 120 feet x 3 (extensive verbal and tactile cueing for improve upright posture/decreased trunk flexion, backward \"reach\" with step, etcetera)   Ther Proc 10 Prone on elbows + HS curl   Ther Proc 10 - Details Still demonstrates noted " hip hike due to decreased hip flexor length; performed 2 x 15 on each LE moving through approximately 40% to 50% of ROM due to muscle thightness, reporting decreased tightness with activity.   Skilled Intervention Verbal cues, demonstration, skilled exercise progression   Patient Response/Progress Added static prone laying, and standing hip extension. He had difficulty coordinating reverse walking on treadmill, so it was stopped and replaced with reverse walking on ground, but he tolerated all other activities well with cueing provided for proper form and no increase in symptoms.   Education   Learner/Method Patient;Listening;Reading;Demonstration;Pictures/Video;No Barriers to Learning   Plan   Home program See PTRx.   Plan for next session Continue to progress core, low back, and hip/leg mobility, flexibility, and strength/stabilization exercises. Trial manual therapy as appropriate (passive hip extension stretching).   Comments   Comments Assessment: Ruy continues to report no back pain, just tightness. However, he reports decreased tightness overall since start of physical therapy and he said he feels like he walks with improved posture. This is demonstrated in clinic with notable remaining, but improved, forward trunk flexion during gait. He responds well to exercises in clinic, and he demonstrates improved static standing posture and gait mechanics even within session. He also demonstrate notable improved lumbar extension AROM. He has not yet attempted golf, but he said he will within th next week. He will continue to benefit from physical therapy to progress toward his goals and improve his function.

## 2023-07-25 ENCOUNTER — THERAPY VISIT (OUTPATIENT)
Dept: PHYSICAL THERAPY | Facility: REHABILITATION | Age: 78
End: 2023-07-25
Payer: COMMERCIAL

## 2023-07-25 DIAGNOSIS — M54.9 BACK PAIN, UNSPECIFIED BACK LOCATION, UNSPECIFIED BACK PAIN LATERALITY, UNSPECIFIED CHRONICITY: Primary | ICD-10-CM

## 2023-07-25 PROCEDURE — 97110 THERAPEUTIC EXERCISES: CPT | Mod: GP

## 2023-07-25 PROCEDURE — 97140 MANUAL THERAPY 1/> REGIONS: CPT | Mod: GP

## 2023-08-01 ENCOUNTER — THERAPY VISIT (OUTPATIENT)
Dept: PHYSICAL THERAPY | Facility: REHABILITATION | Age: 78
End: 2023-08-01
Payer: COMMERCIAL

## 2023-08-01 DIAGNOSIS — M54.9 BACK PAIN, UNSPECIFIED BACK LOCATION, UNSPECIFIED BACK PAIN LATERALITY, UNSPECIFIED CHRONICITY: Primary | ICD-10-CM

## 2023-08-01 PROCEDURE — 97110 THERAPEUTIC EXERCISES: CPT | Mod: GP

## 2023-08-01 PROCEDURE — 97140 MANUAL THERAPY 1/> REGIONS: CPT | Mod: GP

## 2023-08-08 ENCOUNTER — THERAPY VISIT (OUTPATIENT)
Dept: PHYSICAL THERAPY | Facility: REHABILITATION | Age: 78
End: 2023-08-08
Payer: COMMERCIAL

## 2023-08-08 DIAGNOSIS — M54.9 BACK PAIN, UNSPECIFIED BACK LOCATION, UNSPECIFIED BACK PAIN LATERALITY, UNSPECIFIED CHRONICITY: Primary | ICD-10-CM

## 2023-08-08 PROCEDURE — 97140 MANUAL THERAPY 1/> REGIONS: CPT | Mod: GP

## 2023-08-08 PROCEDURE — 97110 THERAPEUTIC EXERCISES: CPT | Mod: GP

## 2023-08-15 ENCOUNTER — THERAPY VISIT (OUTPATIENT)
Dept: PHYSICAL THERAPY | Facility: REHABILITATION | Age: 78
End: 2023-08-15
Payer: COMMERCIAL

## 2023-08-15 DIAGNOSIS — M54.9 BACK PAIN, UNSPECIFIED BACK LOCATION, UNSPECIFIED BACK PAIN LATERALITY, UNSPECIFIED CHRONICITY: Primary | ICD-10-CM

## 2023-08-15 PROCEDURE — 97110 THERAPEUTIC EXERCISES: CPT | Mod: GP

## 2023-08-22 ENCOUNTER — THERAPY VISIT (OUTPATIENT)
Dept: PHYSICAL THERAPY | Facility: REHABILITATION | Age: 78
End: 2023-08-22
Payer: COMMERCIAL

## 2023-08-22 DIAGNOSIS — M54.9 BACK PAIN, UNSPECIFIED BACK LOCATION, UNSPECIFIED BACK PAIN LATERALITY, UNSPECIFIED CHRONICITY: Primary | ICD-10-CM

## 2023-08-22 PROCEDURE — 97110 THERAPEUTIC EXERCISES: CPT | Mod: GP

## 2023-08-22 PROCEDURE — 97140 MANUAL THERAPY 1/> REGIONS: CPT | Mod: GP

## 2023-08-22 NOTE — PROGRESS NOTES
DISCHARGE  Reason for Discharge: Patient has met all goals.  Patient chooses to discontinue therapy.    Equipment Issued: NA    Discharge Plan: Patient to continue home program.    Referring Provider:  Gerald Mota          08/22/23 0500   Appointment Info   Signing clinician's name / credentials Jarred Shannon, ALEXIS   Visits Used 9   Medical Diagnosis Back pain, unspecified back location, unspecified back pain laterality, unspecified chronicity   PT Tx Diagnosis Low back and core flexibility, mobility, and strength deficits with associated gait and posture impairments   Progress Note/Certification   Start of Care Date 06/22/23   Onset of illness/injury or Date of Surgery 05/30/23  (Date recorded is date of referral. Patient reports many year history of symptoms.)   Therapy Frequency 1 time/week   Predicted Duration 1 week   Certification date from 08/17/23   Certification date to 08/24/23   Progress Note Due Date 08/24/23   Progress Note Completed Date 08/22/23   PT Goal 1   Goal Identifier HEP   Goal Description Ruy will demonstrate mastery of and compliance with his home exercise program to facilitate increased rate of improvement.   Goal Progress Reports compliance with HEP and demonstrates good form with minimal cueing.   Target Date 07/20/23   Date Met 08/22/23   PT Goal 2   Goal Identifier Golf   Goal Description Ruy will demonstrate improved tolerance to desired recreational activities as evidenced by his ability to play at least 9 holes of golf without pain or limitation.   Goal Progress Still unattempted; will trial next week   Target Date 08/17/23   PT Goal 3   Goal Identifier Gait   Goal Description Ruy will demonstrate improved tolerance to functional mobility as evidenced by his ability to walk for up to 20 minutes with improved upright posture/decreased trunk lean.   Goal Progress Notable trunk flexion during  gait, but singnificant improvement since initial evaluation   Target Date  08/17/23   Date Met 08/22/23   Subjective Report   Subjective Report Ruy reported no symptom  provocation since his last session. He reports feeling comfortable continuing his HEP/maintenance independently after discharge today.   Objective Measures   Objective Measures Objective Measure 1;Objective Measure 2;Objective Measure 3;Objective Measure 4;Objective Measure 5   Objective Measure 1   Objective Measure Lumbar Extension AROM   Details 75% to 99%   Objective Measure 2   Objective Measure Lumbar Rotation AROM   Details Bilateral: WNL   Objective Measure 3   Objective Measure Hip IR PROM   Details R:mod limited L: max limited   Objective Measure 4   Objective Measure Core Strength   Details Limited: unable to perform supine to sit without upper extremity assist   Objective Measure 5   Objective Measure Hip Extension Strength   Details R: 4+/5 L: 4/5   Therapeutic Procedure/Exercise   Therapeutic Procedures: strength, endurance, ROM, flexibillity minutes (10124) 16   Ther Proc 1 Treadmill + subjective report   Ther Proc 1 - Details 5 min   Ther Proc 2 Objective measures testing   Ther Proc 2 - Details See objective measures   Ther Proc 3 Education   Ther Proc 3 - Details Discharge planning   Manual Therapy   Manual Therapy: Mobilization, MFR, MLD, friction massage minutes (37191) 9   Manual Therapy 1 Manual stretching   Manual Therapy 1 - Details Manual hip flexor and quad stretching to both hips in contralateral side-lying.   Skilled Intervention Manual stretching to decrease tissue tightness and improve trunk/hip extension with gait and standing posture   Patient Response/Progress Ruy reported decreased tightness and demonstrated slightly improved hip/trunk extension posture during gait following.   Education   Learner/Method Patient;Listening;Reading;Demonstration;Pictures/Video;No Barriers to Learning   Plan   Home program See PTRx.   Updates to plan of care Discharge to HEP   Comments   Comments  Assessment: Ruy reports no symptom provocation since his last session. Session focused on discharge planning and assessing objective measures to evaluate progress towards goals. He reports that he is able to walk for greater than 20 minutes with no pain and a residual, but improved, forward trunk lean. Ruy also reports compliance with his HEP and he is confident that he can continue his strength/maintenance program independently going forward. He is now appropriate for discharge, but he requires recertification for today's visit.   Total Session Time   Timed Code Treatment Minutes 25   Total Treatment Time (sum of timed and untimed services) 25

## 2023-08-22 NOTE — PROGRESS NOTES
[facility]                                                                                   OUTPATIENT PHYSICAL THERAPY    PLAN OF TREATMENT FOR OUTPATIENT REHABILITATION   Patient's Last Name, First Name, Ruy Alvarado YOB: 1945   Provider's Name   [facility]   Medical Record No.  [id number]     Onset Date: 05/30/23 (Date recorded is date of referral. Patient reports many year history of symptoms.)  Start of Care Date: 06/22/23     Medical Diagnosis:  Back pain, unspecified back location, unspecified back pain laterality, unspecified chronicity      PT Treatment Diagnosis:  Low back and core flexibility, mobility, and strength deficits with associated gait and posture impairments Plan of Treatment  Frequency/Duration: 1 time/week/ 1 week    Certification date from 08/17/23 to 08/24/23         See note for plan of treatment details and functional goals     Jarred Shannon, PT                         I CERTIFY THE NEED FOR THESE SERVICES FURNISHED UNDER        THIS PLAN OF TREATMENT AND WHILE UNDER MY CARE     (Physician attestation of this document indicates review and certification of the therapy plan).                Referring Provider:  Gerald Mota      Initial Assessment  See Epic Evaluation- Start of Care Date: 06/22/23 08/22/23 0500   Appointment Info   Signing clinician's name / credentials Jarred Shannon, PT   Visits Used 9   Medical Diagnosis Back pain, unspecified back location, unspecified back pain laterality, unspecified chronicity   PT Tx Diagnosis Low back and core flexibility, mobility, and strength deficits with associated gait and posture impairments   Progress Note/Certification   Start of Care Date 06/22/23   Onset of illness/injury or Date of Surgery 05/30/23  (Date recorded is date of referral. Patient reports many year history of symptoms.)   Therapy Frequency 1 time/week   Predicted  Duration 1 week   Certification date from 08/17/23   Certification date to 08/24/23   Progress Note Due Date 08/24/23   Progress Note Completed Date 08/22/23   PT Goal 1   Goal Identifier HEP   Goal Description Ruy will demonstrate mastery of and compliance with his home exercise program to facilitate increased rate of improvement.   Goal Progress Reports compliance with HEP and demonstrates good form with minimal cueing.   Target Date 07/20/23   Date Met 08/22/23   PT Goal 2   Goal Identifier Golf   Goal Description Ruy will demonstrate improved tolerance to desired recreational activities as evidenced by his ability to play at least 9 holes of golf without pain or limitation.   Goal Progress Still unattempted; will trial next week   Target Date 08/17/23   PT Goal 3   Goal Identifier Gait   Goal Description Ruy will demonstrate improved tolerance to functional mobility as evidenced by his ability to walk for up to 20 minutes with improved upright posture/decreased trunk lean.   Goal Progress Notable trunk flexion during  gait, but singnificant improvement since initial evaluation   Target Date 08/17/23   Date Met 08/22/23   Subjective Report   Subjective Report Ruy reported no symptom  provocation since his last session. He reports feeling comfortable continuing his HEP/maintenance independently after discharge today.   Objective Measures   Objective Measures Objective Measure 1;Objective Measure 2;Objective Measure 3;Objective Measure 4;Objective Measure 5   Objective Measure 1   Objective Measure Lumbar Extension AROM   Details 75% to 99%  (Simultaneous filing. User may not have seen previous data.)   Objective Measure 2   Objective Measure Lumbar Rotation AROM   Details Bilateral: WNL   Objective Measure 3   Objective Measure Hip IR PROM   Details R:mod limited L: max limited   Objective Measure 4   Objective Measure Core Strength   Details Limited: unable to perform supine to sit without upper  extremity assist   Objective Measure 5   Objective Measure Hip Extension Strength   Details R: 4+/5 L: 4/5   Therapeutic Procedure/Exercise   Therapeutic Procedures: strength, endurance, ROM, flexibillity minutes (15627) 16   Ther Proc 1 Treadmill + subjective report   Ther Proc 1 - Details 5 min   Ther Proc 2 Objective measures testing   Ther Proc 2 - Details See objective measures   Ther Proc 3 Education   Ther Proc 3 - Details Discharge planning   Manual Therapy   Manual Therapy: Mobilization, MFR, MLD, friction massage minutes (30852) 9   Manual Therapy 1 Manual stretching   Manual Therapy 1 - Details Manual hip flexor and quad stretching to both hips in contralateral side-lying.   Skilled Intervention Manual stretching to decrease tissue tightness and improve trunk/hip extension with gait and standing posture   Patient Response/Progress Ruy reported decreased tightness and demonstrated slightly improved hip/trunk extension posture during gait following.   Education   Learner/Method Patient;Listening;Reading;Demonstration;Pictures/Video;No Barriers to Learning   Plan   Home program See PTRx.   Updates to plan of care Discharge to [facility]   Comments   Comments Assessment: Ruy reports no symptom provocation since his last session. Session focused on discharge planning and assessing objective measures to evaluate progress towards goals. He reports that he is able to walk for greater than 20 minutes with no pain and a residual, but improved, forward trunk lean. Ruy also reports compliance with his HEP and he is confident that he can continue his strength/maintenance program independently going forward. He is now appropriate for discharge, but he requires recertification for today's visit.   Total Session Time   Timed Code Treatment Minutes 25   Total Treatment Time (sum of timed and untimed services) 25

## 2023-09-07 ENCOUNTER — TRANSFERRED RECORDS (OUTPATIENT)
Dept: HEALTH INFORMATION MANAGEMENT | Facility: CLINIC | Age: 78
End: 2023-09-07
Payer: COMMERCIAL

## 2023-09-07 LAB — RETINOPATHY: NEGATIVE

## 2023-09-25 ENCOUNTER — TELEPHONE (OUTPATIENT)
Dept: FAMILY MEDICINE | Facility: CLINIC | Age: 78
End: 2023-09-25

## 2023-09-25 NOTE — TELEPHONE ENCOUNTER
Order/Referral Request    Who is requesting: patient    Orders being requested: physical therapy for hamstring, left leg    Reason service is needed/diagnosis: reoccuring pain    When are orders needed by: asap    Has this been discussed with Provider: Yes, patient has been seen for PT before     Does patient have a preference on a Group/Provider/Facility? [facility]    Does patient have an appointment scheduled?: No    Where to send orders: Place orders within Epic    Could we send this information to you in [facility] or would you prefer to receive a phone call?:   Patient would prefer a phone call   Okay to leave a detailed message?: Yes at Cell number on file:    Telephone Information:   Mobile [phone]

## 2023-09-26 NOTE — TELEPHONE ENCOUNTER
Patient informed of message, pt states that he has some constant ache in back on thigh and he is looking to get taken care of soon. Pt informed of additional options [facility] walk in [facility] and he is going to try that route first and he will contact us if he needs further assistance.

## 2023-10-12 PROBLEM — M54.9 BACK PAIN, UNSPECIFIED BACK LOCATION, UNSPECIFIED BACK PAIN LATERALITY, UNSPECIFIED CHRONICITY: Status: RESOLVED | Noted: 2023-06-22 | Resolved: 2023-10-12

## 2023-11-20 ENCOUNTER — TRANSFERRED RECORDS (OUTPATIENT)
Dept: HEALTH INFORMATION MANAGEMENT | Facility: CLINIC | Age: 78
End: 2023-11-20
Payer: COMMERCIAL

## 2023-11-27 DIAGNOSIS — I10 BENIGN ESSENTIAL HYPERTENSION: ICD-10-CM

## 2023-11-27 RX ORDER — LOSARTAN POTASSIUM 100 MG/1
100 TABLET ORAL DAILY
Qty: 90 TABLET | Refills: 1 | Status: SHIPPED | OUTPATIENT
Start: 2023-11-27 | End: 2024-05-15

## 2023-12-11 DIAGNOSIS — E78.5 HYPERLIPIDEMIA, UNSPECIFIED HYPERLIPIDEMIA TYPE: ICD-10-CM

## 2023-12-11 RX ORDER — ATORVASTATIN CALCIUM 40 MG/1
40 TABLET, FILM COATED ORAL DAILY
Qty: 90 TABLET | Refills: 0 | Status: SHIPPED | OUTPATIENT
Start: 2023-12-11 | End: 2024-05-15

## 2023-12-11 NOTE — TELEPHONE ENCOUNTER
Please call patient and let them know they are due for a visit.    3 month sent to the pharmacy

## 2023-12-24 ENCOUNTER — HEALTH MAINTENANCE LETTER (OUTPATIENT)
Age: 78
End: 2023-12-24

## 2024-01-10 ENCOUNTER — OFFICE VISIT (OUTPATIENT)
Dept: FAMILY MEDICINE | Facility: CLINIC | Age: 79
End: 2024-01-10
Payer: COMMERCIAL

## 2024-01-10 VITALS
TEMPERATURE: 98.1 F | OXYGEN SATURATION: 98 % | BODY MASS INDEX: 28.11 KG/M2 | RESPIRATION RATE: 14 BRPM | SYSTOLIC BLOOD PRESSURE: 112 MMHG | HEIGHT: 74 IN | DIASTOLIC BLOOD PRESSURE: 68 MMHG | WEIGHT: 219 LBS | HEART RATE: 64 BPM

## 2024-01-10 DIAGNOSIS — E78.5 HYPERLIPIDEMIA, UNSPECIFIED HYPERLIPIDEMIA TYPE: ICD-10-CM

## 2024-01-10 DIAGNOSIS — E11.9 DIABETES MELLITUS TYPE 2, NONINSULIN DEPENDENT (H): ICD-10-CM

## 2024-01-10 DIAGNOSIS — I10 BENIGN ESSENTIAL HYPERTENSION: Primary | ICD-10-CM

## 2024-01-10 DIAGNOSIS — K21.00 GASTROESOPHAGEAL REFLUX DISEASE WITH ESOPHAGITIS WITHOUT HEMORRHAGE: ICD-10-CM

## 2024-01-10 LAB
ERYTHROCYTE [DISTWIDTH] IN BLOOD BY AUTOMATED COUNT: 12.5 % (ref 10–15)
HBA1C MFR BLD: 6.7 % (ref 0–5.6)
HCT VFR BLD AUTO: 46.2 % (ref 40–53)
HGB BLD-MCNC: 15.3 G/DL (ref 13.3–17.7)
MCH RBC QN AUTO: 30.6 PG (ref 26.5–33)
MCHC RBC AUTO-ENTMCNC: 33.1 G/DL (ref 31.5–36.5)
MCV RBC AUTO: 92 FL (ref 78–100)
PLATELET # BLD AUTO: 179 10E3/UL (ref 150–450)
RBC # BLD AUTO: 5 10E6/UL (ref 4.4–5.9)
WBC # BLD AUTO: 6.3 10E3/UL (ref 4–11)

## 2024-01-10 PROCEDURE — 36415 COLL VENOUS BLD VENIPUNCTURE: CPT | Performed by: PHYSICIAN ASSISTANT

## 2024-01-10 PROCEDURE — 99214 OFFICE O/P EST MOD 30 MIN: CPT | Performed by: PHYSICIAN ASSISTANT

## 2024-01-10 PROCEDURE — 80053 COMPREHEN METABOLIC PANEL: CPT | Performed by: PHYSICIAN ASSISTANT

## 2024-01-10 PROCEDURE — 82043 UR ALBUMIN QUANTITATIVE: CPT | Performed by: PHYSICIAN ASSISTANT

## 2024-01-10 PROCEDURE — 80061 LIPID PANEL: CPT | Performed by: PHYSICIAN ASSISTANT

## 2024-01-10 PROCEDURE — 85027 COMPLETE CBC AUTOMATED: CPT | Performed by: PHYSICIAN ASSISTANT

## 2024-01-10 PROCEDURE — 83036 HEMOGLOBIN GLYCOSYLATED A1C: CPT | Performed by: PHYSICIAN ASSISTANT

## 2024-01-10 PROCEDURE — 82570 ASSAY OF URINE CREATININE: CPT | Performed by: PHYSICIAN ASSISTANT

## 2024-01-10 RX ORDER — RESPIRATORY SYNCYTIAL VIRUS VACCINE 120MCG/0.5
0.5 KIT INTRAMUSCULAR ONCE
Qty: 1 EACH | Refills: 0 | Status: CANCELLED | OUTPATIENT
Start: 2024-01-10 | End: 2024-01-10

## 2024-01-10 ASSESSMENT — ENCOUNTER SYMPTOMS
FEVER: 0
HEADACHES: 0
SHORTNESS OF BREATH: 0
FREQUENCY: 0
WEAKNESS: 0
ARTHRALGIAS: 0
EYE PAIN: 0
DIARRHEA: 0
DIZZINESS: 0
COUGH: 0
ABDOMINAL PAIN: 0
NERVOUS/ANXIOUS: 0
SORE THROAT: 0
DYSURIA: 0
PALPITATIONS: 0
CHILLS: 0
BRUISES/BLEEDS EASILY: 0
PARESTHESIAS: 0
CONSTIPATION: 0
ALLERGIC/IMMUNOLOGIC NEGATIVE: 1
MYALGIAS: 0

## 2024-01-10 NOTE — PROGRESS NOTES
Assessment & Plan     Benign essential hypertension  Pressure well-controlled at 112/68.  Continue losartan 100 mg daily.  Diet and exercise discussed.  He is to watch salt and alcohol intake.  - CBC with platelets; Future  - Comprehensive metabolic panel (BMP + Alb, Alk Phos, ALT, AST, Total. Bili, TP); Future  - CBC with platelets  - Comprehensive metabolic panel (BMP + Alb, Alk Phos, ALT, AST, Total. Bili, TP)    Diabetes mellitus type 2, noninsulin dependent (H)  Last A1c 5/23 was stable at 6.8.  Currently diet controlled.  He does watch his carbohydrates and sugars.  He is trying to stay active.  Today we will check a complete metabolic panel, A1c and a microalbumin.  Last microalbumin 11/2022 showed some mild proteinuria.  - Comprehensive metabolic panel (BMP + Alb, Alk Phos, ALT, AST, Total. Bili, TP); Future  - Hemoglobin A1c; Future  - Albumin Random Urine Quantitative with Creat Ratio; Future  - Comprehensive metabolic panel (BMP + Alb, Alk Phos, ALT, AST, Total. Bili, TP)  - Hemoglobin A1c  - Albumin Random Urine Quantitative with Creat Ratio    Hemoglobin A1C   Date Value Ref Range Status   01/10/2024 6.7 (H) 0.0 - 5.6 % Final     Comment:     Normal <5.7%   Prediabetes 5.7-6.4%    Diabetes 6.5% or higher     Note: Adopted from ADA consensus guidelines.     Gastroesophageal reflux disease with esophagitis without hemorrhage  Well-controlled on omeprazole    Hyperlipidemia, unspecified hyperlipidemia type  Last lipid panel as of 11/2022 was stable with an LDL of 64, triglyceride of 105, HDL of 41.  Diet and exercise discussed today.  Will check lipid panel today  - Lipid panel reflex to direct LDL Fasting; Future  - Lipid panel reflex to direct LDL Fasting  Recent Labs   Lab Test 11/30/22  0959 09/30/21  1236   CHOL 126 129   HDL 41 41   LDL 64 68   TRIG 105 98      Follow-up Visit   Expected date:  Jul 10, 2024 (Approximate)      Follow Up Appointment Details:     Follow-up with whom?: Me    Follow-Up  "for what?: Medicare Wellness    Welcome or Annual?: Annual Wellness    How?: In Person                     Current Outpatient Medications   Medication    atorvastatin (LIPITOR) 40 MG tablet    Chondroitin Sulfate 400 MG CAPS    Glucos-MSM-C-Me-Hehfrn-Yqxpth (GLUCOSAMINE MSM COMPLEX) TABS tablet    losartan (COZAAR) 100 MG tablet    multivitamin therapeutic (THERAGRAN) tablet    omeprazole (PRILOSEC) 20 MG DR capsule     No current facility-administered medications for this visit.          BMI:   Estimated body mass index is 28.12 kg/m  as calculated from the following:    Height as of this encounter: 1.88 m (6' 2\").    Weight as of this encounter: 99.3 kg (219 lb).   Weight management plan: Discussed healthy diet and exercise guidelines      [facility]    Tyrone Redmond is a 78 year old, presenting for the following health issues:  Hypertension (HTN med check. Pt reports he does not check BP at home)        1/10/2024    11:40 AM   Additional Questions   Roomed by Muna Castrejon   Accompanied by brendan       Ruy is a pleasant 78-year-old male who presents to the clinic today for follow-up on chronic disease management.  Past medical history significant for hypertension, hyperlipidemia, type 2 diabetes, GERD.  Overall doing well.    Last visit 5/23 A1c stable at 6.8.  Kidney function was stable at that time as well.  He is currently diet controlled.  He has been watching his carbohydrates and sugars.  He is trying to stay active    He is also on Lipitor 40 mg daily for cholesterol management and losartan 100 mg daily for blood pressure management.  He also takes omeprazole for GERD.    History of Present Illness       Reason for visit:  Labs    He eats 0-1 servings of fruits and vegetables daily.He consumes 1 sweetened beverage(s) daily.He exercises with enough effort to increase his heart rate 20 to 29 minutes per day.  He exercises with enough effort to " "increase his heart rate 5 days per week.   He is taking medications regularly.       Review of Systems   Constitutional:  Negative for chills and fever.   HENT:  Negative for congestion, ear pain, hearing loss and sore throat.    Eyes:  Negative for pain and visual disturbance.   Respiratory:  Negative for cough and shortness of breath.    Cardiovascular:  Negative for chest pain, palpitations and peripheral edema.   Gastrointestinal:  Negative for abdominal pain, constipation and diarrhea.   Endocrine: Negative for polyuria.   Genitourinary:  Negative for dysuria and frequency.   Musculoskeletal:  Negative for arthralgias and myalgias.   Skin:  Negative for rash.   Allergic/Immunologic: Negative.    Neurological:  Negative for dizziness, weakness, headaches and paresthesias.   Hematological:  Does not bruise/bleed easily.   Psychiatric/Behavioral:  Negative for mood changes. The patient is not nervous/anxious.             Objective    /68 (BP Location: Right arm, Patient Position: Sitting, Cuff Size: Adult Regular)   Pulse 64   Temp 98.1  F (36.7  C) (Oral)   Resp 14   Ht 1.88 m (6' 2\")   Wt 99.3 kg (219 lb)   SpO2 98%   BMI 28.12 kg/m    Body mass index is 28.12 kg/m .  Physical Exam  Vitals and nursing note reviewed.   Constitutional:       Appearance: Normal appearance.   HENT:      Head: Normocephalic and atraumatic.   Eyes:      Conjunctiva/sclera: Conjunctivae normal.   Cardiovascular:      Rate and Rhythm: Normal rate and regular rhythm.      Heart sounds: No murmur heard.     No friction rub. No gallop.   Pulmonary:      Effort: Pulmonary effort is normal.      Breath sounds: No wheezing, rhonchi or rales.   Neurological:      General: No focal deficit present.      Mental Status: He is alert and oriented to person, place, and time.      Motor: No weakness.      Gait: Gait normal.   Psychiatric:         Mood and Affect: Mood normal.         Behavior: Behavior normal.         Thought Content: " Thought content normal.         Judgment: Judgment normal.

## 2024-01-11 LAB
ALBUMIN SERPL BCG-MCNC: 4.5 G/DL (ref 3.5–5.2)
ALP SERPL-CCNC: 67 U/L (ref 40–150)
ALT SERPL W P-5'-P-CCNC: 21 U/L (ref 0–70)
ANION GAP SERPL CALCULATED.3IONS-SCNC: 10 MMOL/L (ref 7–15)
AST SERPL W P-5'-P-CCNC: 22 U/L (ref 0–45)
BILIRUB SERPL-MCNC: 0.9 MG/DL
BUN SERPL-MCNC: 22.5 MG/DL (ref 8–23)
CALCIUM SERPL-MCNC: 9.8 MG/DL (ref 8.8–10.2)
CHLORIDE SERPL-SCNC: 104 MMOL/L (ref 98–107)
CHOLEST SERPL-MCNC: 130 MG/DL
CREAT SERPL-MCNC: 0.81 MG/DL (ref 0.67–1.17)
CREAT UR-MCNC: 164 MG/DL
DEPRECATED HCO3 PLAS-SCNC: 27 MMOL/L (ref 22–29)
EGFRCR SERPLBLD CKD-EPI 2021: 90 ML/MIN/1.73M2
FASTING STATUS PATIENT QL REPORTED: NORMAL
GLUCOSE SERPL-MCNC: 126 MG/DL (ref 70–99)
HDLC SERPL-MCNC: 42 MG/DL
LDLC SERPL CALC-MCNC: 67 MG/DL
MICROALBUMIN UR-MCNC: 14.1 MG/L
MICROALBUMIN/CREAT UR: 8.6 MG/G CR (ref 0–17)
NONHDLC SERPL-MCNC: 88 MG/DL
POTASSIUM SERPL-SCNC: 4.9 MMOL/L (ref 3.4–5.3)
PROT SERPL-MCNC: 6.9 G/DL (ref 6.4–8.3)
SODIUM SERPL-SCNC: 141 MMOL/L (ref 135–145)
TRIGL SERPL-MCNC: 105 MG/DL

## 2024-02-21 DIAGNOSIS — K21.00 GASTROESOPHAGEAL REFLUX DISEASE WITH ESOPHAGITIS WITHOUT HEMORRHAGE: ICD-10-CM

## 2024-02-21 NOTE — TELEPHONE ENCOUNTER
Pending Prescriptions:                       Disp   Refills    omeprazole (PRILOSEC) 20 MG DR capsule    90 cap*3            Sig: Take 1 capsule (20 mg) by mouth every other day           Take first thing in AM on empty stomach

## 2024-02-24 NOTE — TELEPHONE ENCOUNTER
Called Rx to pharmacist for Omeprazole that failed transmission to pharmacy on 2/22/2024.  Darlene Martins RN

## 2024-05-15 DIAGNOSIS — K21.00 GASTROESOPHAGEAL REFLUX DISEASE WITH ESOPHAGITIS WITHOUT HEMORRHAGE: ICD-10-CM

## 2024-05-15 DIAGNOSIS — E78.5 HYPERLIPIDEMIA, UNSPECIFIED HYPERLIPIDEMIA TYPE: ICD-10-CM

## 2024-05-15 DIAGNOSIS — I10 BENIGN ESSENTIAL HYPERTENSION: ICD-10-CM

## 2024-05-15 NOTE — TELEPHONE ENCOUNTER
Pending Prescriptions:                       Disp   Refills    omeprazole (PRILOSEC) 20 MG DR capsule    90 cap*1            Sig: Take 1 capsule (20 mg) by mouth every other day           Take first thing in AM on empty stomach    losartan (COZAAR) 100 MG tablet           90 tab*1            Sig: Take 1 tablet (100 mg) by mouth daily    atorvastatin (LIPITOR) 40 MG tablet       90 tab*0            Sig: Take 1 tablet (40 mg) by mouth daily

## 2024-05-17 RX ORDER — LOSARTAN POTASSIUM 100 MG/1
100 TABLET ORAL DAILY
Qty: 90 TABLET | Refills: 1 | Status: SHIPPED | OUTPATIENT
Start: 2024-05-17

## 2024-05-17 RX ORDER — ATORVASTATIN CALCIUM 40 MG/1
40 TABLET, FILM COATED ORAL DAILY
Qty: 90 TABLET | Refills: 0 | Status: SHIPPED | OUTPATIENT
Start: 2024-05-17 | End: 2024-09-03

## 2024-07-08 SDOH — HEALTH STABILITY: PHYSICAL HEALTH: ON AVERAGE, HOW MANY MINUTES DO YOU ENGAGE IN EXERCISE AT THIS LEVEL?: 30 MIN

## 2024-07-08 SDOH — HEALTH STABILITY: PHYSICAL HEALTH: ON AVERAGE, HOW MANY DAYS PER WEEK DO YOU ENGAGE IN MODERATE TO STRENUOUS EXERCISE (LIKE A BRISK WALK)?: 3 DAYS

## 2024-07-08 ASSESSMENT — SOCIAL DETERMINANTS OF HEALTH (SDOH): HOW OFTEN DO YOU GET TOGETHER WITH FRIENDS OR RELATIVES?: TWICE A WEEK

## 2024-07-11 ENCOUNTER — TRANSFERRED RECORDS (OUTPATIENT)
Dept: HEALTH INFORMATION MANAGEMENT | Facility: CLINIC | Age: 79
End: 2024-07-11
Payer: COMMERCIAL

## 2024-07-12 ENCOUNTER — OFFICE VISIT (OUTPATIENT)
Dept: FAMILY MEDICINE | Facility: CLINIC | Age: 79
End: 2024-07-12
Attending: PHYSICIAN ASSISTANT
Payer: COMMERCIAL

## 2024-07-12 VITALS
OXYGEN SATURATION: 96 % | SYSTOLIC BLOOD PRESSURE: 122 MMHG | DIASTOLIC BLOOD PRESSURE: 64 MMHG | TEMPERATURE: 98.3 F | WEIGHT: 216 LBS | RESPIRATION RATE: 14 BRPM | HEART RATE: 56 BPM | HEIGHT: 74 IN | BODY MASS INDEX: 27.72 KG/M2

## 2024-07-12 DIAGNOSIS — M54.50 CHRONIC RIGHT-SIDED LOW BACK PAIN WITHOUT SCIATICA: ICD-10-CM

## 2024-07-12 DIAGNOSIS — Z00.00 MEDICARE ANNUAL WELLNESS VISIT, SUBSEQUENT: Primary | ICD-10-CM

## 2024-07-12 DIAGNOSIS — E11.9 DIABETES MELLITUS TYPE 2, NONINSULIN DEPENDENT (H): ICD-10-CM

## 2024-07-12 DIAGNOSIS — G89.29 CHRONIC RIGHT-SIDED LOW BACK PAIN WITHOUT SCIATICA: ICD-10-CM

## 2024-07-12 DIAGNOSIS — I10 BENIGN ESSENTIAL HYPERTENSION: ICD-10-CM

## 2024-07-12 DIAGNOSIS — E78.5 HYPERLIPIDEMIA, UNSPECIFIED HYPERLIPIDEMIA TYPE: ICD-10-CM

## 2024-07-12 DIAGNOSIS — K21.00 GASTROESOPHAGEAL REFLUX DISEASE WITH ESOPHAGITIS WITHOUT HEMORRHAGE: ICD-10-CM

## 2024-07-12 LAB — HBA1C MFR BLD: 6.6 % (ref 0–5.6)

## 2024-07-12 PROCEDURE — 99214 OFFICE O/P EST MOD 30 MIN: CPT | Mod: 25 | Performed by: PHYSICIAN ASSISTANT

## 2024-07-12 PROCEDURE — 83036 HEMOGLOBIN GLYCOSYLATED A1C: CPT | Performed by: PHYSICIAN ASSISTANT

## 2024-07-12 PROCEDURE — G0439 PPPS, SUBSEQ VISIT: HCPCS | Performed by: PHYSICIAN ASSISTANT

## 2024-07-12 PROCEDURE — 36415 COLL VENOUS BLD VENIPUNCTURE: CPT | Performed by: PHYSICIAN ASSISTANT

## 2024-07-12 PROCEDURE — 80048 BASIC METABOLIC PNL TOTAL CA: CPT | Performed by: PHYSICIAN ASSISTANT

## 2024-07-12 RX ORDER — CYCLOBENZAPRINE HCL 5 MG
5-10 TABLET ORAL 3 TIMES DAILY PRN
Qty: 30 TABLET | Refills: 2 | Status: SHIPPED | OUTPATIENT
Start: 2024-07-12

## 2024-07-12 ASSESSMENT — ENCOUNTER SYMPTOMS
COLOR CHANGE: 0
HEMATURIA: 0
COUGH: 0
VOMITING: 0
PALPITATIONS: 0
DYSURIA: 0
SHORTNESS OF BREATH: 0
ABDOMINAL PAIN: 0
CHILLS: 0
SORE THROAT: 0
BACK PAIN: 1
SEIZURES: 0
EYE PAIN: 0
FEVER: 0
ARTHRALGIAS: 0

## 2024-07-12 NOTE — PROGRESS NOTES
Preventive Care Visit  [facility]  Gerald Mota PA-C, Family Medicine  Jul 12, 2024      Assessment & Plan     Medicare annual wellness visit, subsequent  Overall doing well.  Will update screening labs.    Benign essential hypertension  Blood pressure well-controlled at 122/64 continue with losartan.    Hyperlipidemia, unspecified hyperlipidemia type  Currently on Lipitor.  Last lipid panel was at goal.  Continue to watch diet and stay active.  Recent Labs   Lab Test 01/10/24  1200 11/30/22  0959   CHOL 130 126   HDL 42 41   LDL 67 64   TRIG 105 105       Diabetes mellitus type 2, noninsulin dependent (H)  Currently diet controlled.  Last A1c 1/24 6.7.  A1c has been as high as 6.9.  He states that he is staying active and watching his diet to prevent him from starting medications.  Recommended annual diabetic eye exams.  Today we will check a BNP and an A1c.  Last microalbumin 6 months ago did show some mild proteinuria will continue to monitor this and recheck in 6 months.  - Basic metabolic panel; Future  - Hemoglobin A1c; Future  - Basic metabolic panel  - Hemoglobin A1c  Hemoglobin A1C   Date Value Ref Range Status   07/12/2024 6.6 (H) 0.0 - 5.6 % Final     Comment:     Normal <5.7%   Prediabetes 5.7-6.4%    Diabetes 6.5% or higher     Note: Adopted from ADA consensus guidelines.     Gastroesophageal reflux disease with esophagitis without hemorrhage  Symptoms well-controlled at this time.  Currently using omeprazole    Chronic right-sided low back pain without sciatica  Chronic lower back pain.  States that his back pain is more so on the left more than the right.  No radiculopathy type symptoms.  He was doing physical therapy for the last 6 to 7 months and states that there has been some improvement but the pain continues.  No injury that he can think of.  No palpable tenderness to the thoracic or lumbar spine with palpation.  No pain throughout the hip girdle.  Negative  straight leg raise bilaterally.  Since he has been having ongoing pain for about 2 years he would be willing to do an MRI of his lumbar spine.  Also will treat him with Flexeril 5 to 10 mg up to 3 times a day as needed.  Recommended not taking this if he is going to be driving or operate heavy equipment as it can cause drowsiness.  - MR Lumbar Spine w/o Contrast; Future  - cyclobenzaprine (FLEXERIL) 5 MG tablet; Take 1-2 tablets (5-10 mg) by mouth 3 times daily as needed for muscle spasms    Colon cancer screening  Up-to-date on colonoscopy    Prostate cancer screening  Declined any further screening at this time.     Follow-up Visit   Expected date:  Jan 12, 2025 (Approximate)      Follow Up Appointment Details:     Follow-up with whom?: Me    Follow-Up for what?: Chronic Disease f/u    Chronic Disease f/u: General (Other)    How?: In Person                     Current Outpatient Medications   Medication Sig Dispense Refill    atorvastatin (LIPITOR) 40 MG tablet Take 1 tablet (40 mg) by mouth daily 90 tablet 0    Chondroitin Sulfate 400 MG CAPS Take 1,200 mg by mouth      cyclobenzaprine (FLEXERIL) 5 MG tablet Take 1-2 tablets (5-10 mg) by mouth 3 times daily as needed for muscle spasms 30 tablet 2    Glucos-MSM-C-Mf-Egpfoy-Cjtlrg (GLUCOSAMINE MSM COMPLEX) TABS tablet Take by mouth daily      losartan (COZAAR) 100 MG tablet Take 1 tablet (100 mg) by mouth daily 90 tablet 1    multivitamin therapeutic (THERAGRAN) tablet [MULTIVITAMIN THERAPEUTIC (THERAGRAN) TABLET] Take 1 tablet by mouth daily.      omeprazole (PRILOSEC) 20 MG DR capsule Take 1 capsule (20 mg) by mouth every other day Take first thing in AM on empty stomach 90 capsule 1     No current facility-administered medications for this visit.         Patient has been advised of split billing requirements and indicates understanding: Yes        BMI  Estimated body mass index is 27.73 kg/m  as calculated from the following:    Height as of this encounter: 1.88  "m (6' 2\").    Weight as of this encounter: 98 kg (216 lb).   Weight management plan: Discussed healthy diet and exercise guidelines    Counseling  Appropriate preventive services were discussed with this patient, including applicable screening as appropriate for fall prevention, nutrition, physical activity, Tobacco-use cessation, weight loss and cognition.  Checklist reviewing preventive services available has been given to the patient.  Reviewed patient's diet, addressing concerns and/or questions.   He is at risk for lack of exercise and has been provided with information to increase physical activity for the benefit of his well-being.           Subjective   Ruy is a 79 year old, presenting for the following:  Annual Visit (Fasting Annual Medicare Wellness)        7/12/2024    10:26 AM   Additional Questions   Roomed by Muna Castrejon   Accompanied by none         Health Care Directive  Patient does not have a Health Care Directive or Living Will: Discussed advance care planning with patient; however, patient declined at this time.    Ruy is a pleasant 79-year-old male who presents to the clinic today for Medicare annual wellness visit.  Past medical history significant for hypertension, hyperlipidemia, type 2 diabetes currently diet controlled and GERD.  Overall doing well.    He has been dealing with chronic lower back pain on and off for 2 to 3 years now.  He did physical therapy starting in January which he feels helped but he continues to have ongoing pain.  States that the pain is more so on the right side than the left.  He denies any numbness or tingling down the leg.  The pain does go into the buttocks at times.  No injury that he can think of.  He denies any bowel or bladder dysfunction.  The pain is worse after doing yard work.  He is able to sleep at night.    He does have type 2 diabetes but currently diet controlled.  Labs as of 1/24 A1c was 6.7 at that time.            7/8/2024   General Health "   How would you rate your overall physical health? Good   Feel stress (tense, anxious, or unable to sleep) Not at all            7/8/2024   Nutrition   Diet: I don't know            7/8/2024   Exercise   Days per week of moderate/strenous exercise 3 days   Average minutes spent exercising at this level 30 min            7/8/2024   Social Factors   Frequency of gathering with friends or relatives Twice a week   Worry food won't last until get money to buy more No    No   Food not last or not have enough money for food? No    No   Do you have housing? (Housing is defined as stable permanent housing and does not include staying ouside in a car, in a tent, in an abandoned building, in an overnight shelter, or couch-surfing.) Yes    Yes   Are you worried about losing your housing? No    No   Lack of transportation? No    No   Unable to get utilities (heat,electricity)? No    No       Multiple values from one day are sorted in reverse-chronological order         7/12/2024   Fall Risk   Gait Speed Test (Document in seconds) 4.02   Gait Speed Test Interpretation Less than or equal to 5.00 seconds - PASS             7/8/2024   Activities of Daily Living- Home Safety   Needs help with the following daily activites None of the above   Safety concerns in the home None of the above            7/8/2024   Dental   Dentist two times every year? Yes            7/8/2024   Hearing Screening   Hearing concerns? None of the above            7/8/2024   Driving Risk Screening   Patient/family members have concerns about driving No            7/8/2024   General Alertness/Fatigue Screening   Have you been more tired than usual lately? No            7/8/2024   Urinary Incontinence Screening   Bothered by leaking urine in past 6 months No            7/8/2024   TB Screening   Were you born outside of the [place]? No            Today's PHQ-2 Score:       7/12/2024    10:25 AM   PHQ-2 ( 1999 [employer])   Q1: Little interest or pleasure in doing things 0    Q2: Feeling down, depressed or hopeless 0   PHQ-2 Score 0   Q1: Little interest or pleasure in doing things Not at all   Q2: Feeling down, depressed or hopeless Not at all   PHQ-2 Score 0           2024   Substance Use   Alcohol more than 3/day or more than 7/wk No   Do you have a current opioid prescription? No   How severe/bad is pain from 1 to 10? 2/10   Do you use any other substances recreationally? No        Social History     Tobacco Use    Smoking status: Former     Current packs/day: 0.00     Types: Cigarettes     Quit date: 1969     Years since quittin.5     Passive exposure: Never    Smokeless tobacco: Never   Vaping Use    Vaping status: Never Used   Substance Use Topics    Alcohol use: Yes     Alcohol/week: 1.0 standard drink of alcohol     Comment: Alcoholic Drinks/day: occassional    Drug use: No       ASCVD Risk   The 10-year ASCVD risk score (Christian CALDERÓN, et al., 2019) is: 52.2%    Values used to calculate the score:      Age: 79 years      Sex: Male      Is Non- : No      Diabetic: Yes      Tobacco smoker: No      Systolic Blood Pressure: 122 mmHg      Is BP treated: Yes      HDL Cholesterol: 42 mg/dL      Total Cholesterol: 130 mg/dL          Reviewed and updated as needed this visit by Provider                    Past Medical History:   Diagnosis Date    Benign essential hypertension 2018    Diabetes mellitus type 2, noninsulin dependent (H) 2018    Hyperlipemia     Reflux esophagitis      Past Surgical History:   Procedure Laterality Date    CHOLECYSTECTOMY  2014    dr. [facility]    HC REMOVAL OF TONSILS,<11 Y/O      Description: Tonsillectomy;  Recorded: 2008;    INGUINAL HERNIA REPAIR Left 2016    Procedure: LEFT INGUINAL HERNIA REPAIR;  Surgeon: Drew Way MD;  Location: [facility];  Service:     OTHER SURGICAL HISTORY [facility] right  inferior oblique myomectomy at same time    TONSILLECTOMY       Current providers sharing in care for this patient include:  Patient Care Team:  Gerald Mota PA-C as PCP - General (Family Medicine)  Gerald Mota PA-C as Assigned PCP    The following health maintenance items are reviewed in Epic and correct as of today:  Health Maintenance   Topic Date Due    DTAP/TDAP/TD IMMUNIZATION (2 - Td or Tdap) 05/29/2022    DIABETIC FOOT EXAM  09/30/2022    ANNUAL REVIEW OF HM ORDERS  05/18/2023    COVID-19 Vaccine (8 - 2023-24 season) 03/21/2024    A1C  07/10/2024    INFLUENZA VACCINE (1) 09/01/2024    EYE EXAM  09/07/2024    BMP  01/10/2025    LIPID  01/10/2025    MICROALBUMIN  01/10/2025    MEDICARE ANNUAL WELLNESS VISIT  07/12/2025    FALL RISK ASSESSMENT  07/12/2025    ADVANCE CARE PLANNING  05/30/2028    COLORECTAL CANCER SCREENING  06/27/2028    HEPATITIS C SCREENING  Completed    PHQ-2 (once per calendar year)  Completed    Pneumococcal Vaccine: 65+ Years  Completed    ZOSTER IMMUNIZATION  Completed    RSV VACCINE (Pregnancy & 60+)  Completed    IPV IMMUNIZATION  Aged Out    HPV IMMUNIZATION  Aged Out    MENINGITIS IMMUNIZATION  Aged Out    RSV MONOCLONAL ANTIBODY  Aged Out       Review of Systems   Constitutional:  Negative for chills and fever.   HENT:  Negative for ear pain and sore throat.    Eyes:  Negative for pain and visual disturbance.   Respiratory:  Negative for cough and shortness of breath.    Cardiovascular:  Negative for chest pain and palpitations.   Gastrointestinal:  Negative for abdominal pain and vomiting.   Genitourinary:  Negative for dysuria and hematuria.   Musculoskeletal:  Positive for back pain. Negative for arthralgias.   Skin:  Negative for color change and rash.   Neurological:  Negative for seizures and syncope.   All other systems reviewed and are negative.         Objective    Exam  /64 (BP Location: Left arm, Patient Position: Sitting, Cuff Size: Adult Regular)    "Pulse 56   Temp 98.3  F (36.8  C) (Oral)   Resp 14   Ht 1.88 m (6' 2\")   Wt 98 kg (216 lb)   SpO2 96%   BMI 27.73 kg/m     Estimated body mass index is 27.73 kg/m  as calculated from the following:    Height as of this encounter: 1.88 m (6' 2\").    Weight as of this encounter: 98 kg (216 lb).    Physical Exam  Vitals and nursing note reviewed.   Constitutional:       General: He is not in acute distress.     Appearance: Normal appearance. He is well-developed and well-groomed. He is not ill-appearing or toxic-appearing.   HENT:      Head: Normocephalic and atraumatic.      Right Ear: Tympanic membrane, ear canal and external ear normal.      Left Ear: Tympanic membrane, ear canal and external ear normal.      Mouth/Throat:      Lips: Pink.      Mouth: Mucous membranes are moist.      Palate: No mass.      Pharynx: Oropharynx is clear. Uvula midline.      Tonsils: No tonsillar exudate or tonsillar abscesses.   Eyes:      General: Lids are normal.         Right eye: No discharge.         Left eye: No discharge.   Neck:      Trachea: Trachea normal.   Cardiovascular:      Rate and Rhythm: Normal rate and regular rhythm.      Heart sounds: S1 normal and S2 normal. No murmur heard.  Pulmonary:      Effort: Pulmonary effort is normal.      Breath sounds: Normal breath sounds and air entry.   Abdominal:      General: Bowel sounds are normal. There is no distension.      Palpations: Abdomen is soft.      Tenderness: There is no abdominal tenderness. There is no guarding or rebound.   Musculoskeletal:      Cervical back: Full passive range of motion without pain and neck supple.      Right lower leg: No edema.      Left lower leg: No edema.      Comments: Noticed thoracic or lumbar tenderness with palpation.  No pain to the paravertebral muscles to the lumbar spine bilaterally.  No weakness to the lower extremity.   Lymphadenopathy:      Cervical: No cervical adenopathy.   Skin:     General: Skin is warm and dry.      " Findings: No lesion or rash.   Neurological:      General: No focal deficit present.      Mental Status: He is alert.      Cranial Nerves: No cranial nerve deficit.      Gait: Gait is intact.      Deep Tendon Reflexes:      Reflex Scores:       Patellar reflexes are 2+ on the right side and 2+ on the left side.  Psychiatric:         Attention and Perception: Attention normal.         Mood and Affect: Mood normal.         Speech: Speech normal.             7/12/2024   Mini Cog   Clock Draw Score 2 Normal   3 Item Recall 3 objects recalled   Mini Cog Total Score 5                 Signed Electronically by: Gerald Mota PA-C

## 2024-07-12 NOTE — PATIENT INSTRUCTIONS
Please call the radiology dep at [facility] to schedule your test today at [phone]

## 2024-07-13 LAB
ANION GAP SERPL CALCULATED.3IONS-SCNC: 11 MMOL/L (ref 7–15)
BUN SERPL-MCNC: 21.1 MG/DL (ref 8–23)
CALCIUM SERPL-MCNC: 9.3 MG/DL (ref 8.8–10.2)
CHLORIDE SERPL-SCNC: 107 MMOL/L (ref 98–107)
CREAT SERPL-MCNC: 0.82 MG/DL (ref 0.67–1.17)
DEPRECATED HCO3 PLAS-SCNC: 26 MMOL/L (ref 22–29)
EGFRCR SERPLBLD CKD-EPI 2021: 89 ML/MIN/1.73M2
GLUCOSE SERPL-MCNC: 126 MG/DL (ref 70–99)
POTASSIUM SERPL-SCNC: 4.4 MMOL/L (ref 3.4–5.3)
SODIUM SERPL-SCNC: 144 MMOL/L (ref 135–145)

## 2024-08-13 ENCOUNTER — HOSPITAL ENCOUNTER (OUTPATIENT)
Dept: MRI IMAGING | Facility: CLINIC | Age: 79
Discharge: HOME | End: 2024-08-13
Attending: PHYSICIAN ASSISTANT | Admitting: PHYSICIAN ASSISTANT
Payer: COMMERCIAL

## 2024-08-13 DIAGNOSIS — G89.29 CHRONIC RIGHT-SIDED LOW BACK PAIN WITHOUT SCIATICA: ICD-10-CM

## 2024-08-13 DIAGNOSIS — M54.50 CHRONIC RIGHT-SIDED LOW BACK PAIN WITHOUT SCIATICA: ICD-10-CM

## 2024-08-13 PROCEDURE — 72148 MRI LUMBAR SPINE W/O DYE: CPT

## 2024-08-16 DIAGNOSIS — M48.20 BAASTRUP'S SYNDROME: ICD-10-CM

## 2024-08-16 DIAGNOSIS — M54.9 BACK PAIN, UNSPECIFIED BACK LOCATION, UNSPECIFIED BACK PAIN LATERALITY, UNSPECIFIED CHRONICITY: Primary | ICD-10-CM

## 2024-08-16 DIAGNOSIS — N28.1 RENAL CYST: ICD-10-CM

## 2024-08-19 ENCOUNTER — TRANSFERRED RECORDS (OUTPATIENT)
Dept: HEALTH INFORMATION MANAGEMENT | Facility: CLINIC | Age: 79
End: 2024-08-19
Payer: COMMERCIAL

## 2024-08-20 ENCOUNTER — TELEPHONE (OUTPATIENT)
Dept: FAMILY MEDICINE | Facility: CLINIC | Age: 79
End: 2024-08-20
Payer: COMMERCIAL

## 2024-08-20 NOTE — TELEPHONE ENCOUNTER
Order/Referral Request    Who is requesting: patient    Orders being requested: orthopedic     Reason service is needed/diagnosis: n/a Per patient my referral was send to [facility] but I am wondering if PCP can send it over to [facility] in [place] instead please.     When are orders needed by: asap    Has this been discussed with Provider: No    Does patient have a preference on a Group/Provider/Facility? [facility]     Does patient have an appointment scheduled?: No    Where to send orders:  which ever is soonest and best way    Could we send this information to you in [facility] or would you prefer to receive a phone call?:   Patient would prefer a phone call   Okay to leave a detailed message?: Yes at Cell number on file:    Telephone Information:   Mobile [phone]

## 2024-08-27 ENCOUNTER — HOSPITAL ENCOUNTER (OUTPATIENT)
Dept: ULTRASOUND IMAGING | Facility: CLINIC | Age: 79
Discharge: HOME | End: 2024-08-27
Attending: PHYSICIAN ASSISTANT | Admitting: PHYSICIAN ASSISTANT
Payer: COMMERCIAL

## 2024-08-27 DIAGNOSIS — N28.1 RENAL CYST: Primary | ICD-10-CM

## 2024-08-27 DIAGNOSIS — N28.1 RENAL CYST: ICD-10-CM

## 2024-08-27 PROCEDURE — 76770 US EXAM ABDO BACK WALL COMP: CPT

## 2024-09-03 DIAGNOSIS — E78.5 HYPERLIPIDEMIA, UNSPECIFIED HYPERLIPIDEMIA TYPE: ICD-10-CM

## 2024-09-03 RX ORDER — ATORVASTATIN CALCIUM 40 MG/1
40 TABLET, FILM COATED ORAL DAILY
Qty: 90 TABLET | Refills: 2 | Status: SHIPPED | OUTPATIENT
Start: 2024-09-03

## 2024-09-04 RX ORDER — ATORVASTATIN CALCIUM 40 MG/1
40 TABLET, FILM COATED ORAL DAILY
Qty: 90 TABLET | Refills: 0 | OUTPATIENT
Start: 2024-09-04

## 2024-10-02 ENCOUNTER — TRANSFERRED RECORDS (OUTPATIENT)
Dept: HEALTH INFORMATION MANAGEMENT | Facility: CLINIC | Age: 79
End: 2024-10-02
Payer: COMMERCIAL

## 2024-11-05 ENCOUNTER — HOSPITAL ENCOUNTER (EMERGENCY)
Facility: CLINIC | Age: 79
Discharge: HOME | End: 2024-11-05
Attending: EMERGENCY MEDICINE | Admitting: EMERGENCY MEDICINE
Payer: COMMERCIAL

## 2024-11-05 VITALS
HEART RATE: 142 BPM | RESPIRATION RATE: 17 BRPM | OXYGEN SATURATION: 97 % | TEMPERATURE: 97.8 F | SYSTOLIC BLOOD PRESSURE: 151 MMHG | DIASTOLIC BLOOD PRESSURE: 91 MMHG

## 2024-11-05 DIAGNOSIS — I48.91 NEW ONSET ATRIAL FIBRILLATION (H): ICD-10-CM

## 2024-11-05 LAB
ANION GAP SERPL CALCULATED.3IONS-SCNC: 11 MMOL/L (ref 7–15)
BASOPHILS # BLD AUTO: 0 10E3/UL (ref 0–0.2)
BASOPHILS NFR BLD AUTO: 0 %
BUN SERPL-MCNC: 20 MG/DL (ref 8–23)
CALCIUM SERPL-MCNC: 9.2 MG/DL (ref 8.8–10.4)
CHLORIDE SERPL-SCNC: 105 MMOL/L (ref 98–107)
CREAT SERPL-MCNC: 0.84 MG/DL (ref 0.67–1.17)
EGFRCR SERPLBLD CKD-EPI 2021: 89 ML/MIN/1.73M2
EOSINOPHIL # BLD AUTO: 0.3 10E3/UL (ref 0–0.7)
EOSINOPHIL NFR BLD AUTO: 4 %
ERYTHROCYTE [DISTWIDTH] IN BLOOD BY AUTOMATED COUNT: 13.1 % (ref 10–15)
GLUCOSE SERPL-MCNC: 107 MG/DL (ref 70–99)
HCO3 SERPL-SCNC: 26 MMOL/L (ref 22–29)
HCT VFR BLD AUTO: 45.9 % (ref 40–53)
HGB BLD-MCNC: 14.8 G/DL (ref 13.3–17.7)
IMM GRANULOCYTES # BLD: 0 10E3/UL
IMM GRANULOCYTES NFR BLD: 0 %
LYMPHOCYTES # BLD AUTO: 1.6 10E3/UL (ref 0.8–5.3)
LYMPHOCYTES NFR BLD AUTO: 21 %
MAGNESIUM SERPL-MCNC: 2.1 MG/DL (ref 1.7–2.3)
MCH RBC QN AUTO: 30 PG (ref 26.5–33)
MCHC RBC AUTO-ENTMCNC: 32.2 G/DL (ref 31.5–36.5)
MCV RBC AUTO: 93 FL (ref 78–100)
MONOCYTES # BLD AUTO: 0.6 10E3/UL (ref 0–1.3)
MONOCYTES NFR BLD AUTO: 8 %
NEUTROPHILS # BLD AUTO: 4.9 10E3/UL (ref 1.6–8.3)
NEUTROPHILS NFR BLD AUTO: 67 %
NRBC # BLD AUTO: 0 10E3/UL
NRBC BLD AUTO-RTO: 0 /100
NT-PROBNP SERPL-MCNC: 104 PG/ML (ref 0–1800)
PLATELET # BLD AUTO: 181 10E3/UL (ref 150–450)
POTASSIUM SERPL-SCNC: 3.7 MMOL/L (ref 3.4–5.3)
RBC # BLD AUTO: 4.94 10E6/UL (ref 4.4–5.9)
SODIUM SERPL-SCNC: 142 MMOL/L (ref 135–145)
TROPONIN T SERPL HS-MCNC: 23 NG/L
TROPONIN T SERPL HS-MCNC: 26 NG/L
TSH SERPL DL<=0.005 MIU/L-ACNC: 0.99 UIU/ML (ref 0.3–4.2)
WBC # BLD AUTO: 7.3 10E3/UL (ref 4–11)

## 2024-11-05 PROCEDURE — 84484 ASSAY OF TROPONIN QUANT: CPT | Performed by: EMERGENCY MEDICINE

## 2024-11-05 PROCEDURE — 83880 ASSAY OF NATRIURETIC PEPTIDE: CPT | Performed by: EMERGENCY MEDICINE

## 2024-11-05 PROCEDURE — 83735 ASSAY OF MAGNESIUM: CPT | Performed by: EMERGENCY MEDICINE

## 2024-11-05 PROCEDURE — 82947 ASSAY GLUCOSE BLOOD QUANT: CPT | Performed by: EMERGENCY MEDICINE

## 2024-11-05 PROCEDURE — 80048 BASIC METABOLIC PNL TOTAL CA: CPT | Performed by: EMERGENCY MEDICINE

## 2024-11-05 PROCEDURE — 93005 ELECTROCARDIOGRAM TRACING: CPT | Performed by: EMERGENCY MEDICINE

## 2024-11-05 PROCEDURE — 84443 ASSAY THYROID STIM HORMONE: CPT | Performed by: EMERGENCY MEDICINE

## 2024-11-05 PROCEDURE — 99283 EMERGENCY DEPT VISIT LOW MDM: CPT | Performed by: EMERGENCY MEDICINE

## 2024-11-05 PROCEDURE — 85004 AUTOMATED DIFF WBC COUNT: CPT | Performed by: EMERGENCY MEDICINE

## 2024-11-05 PROCEDURE — 36415 COLL VENOUS BLD VENIPUNCTURE: CPT | Performed by: EMERGENCY MEDICINE

## 2024-11-05 ASSESSMENT — ACTIVITIES OF DAILY LIVING (ADL)
ADLS_ACUITY_SCORE: 0

## 2024-11-05 ASSESSMENT — COLUMBIA-SUICIDE SEVERITY RATING SCALE - C-SSRS
6. HAVE YOU EVER DONE ANYTHING, STARTED TO DO ANYTHING, OR PREPARED TO DO ANYTHING TO END YOUR LIFE?: NO
1. IN THE PAST MONTH, HAVE YOU WISHED YOU WERE DEAD OR WISHED YOU COULD GO TO SLEEP AND NOT WAKE UP?: NO
2. HAVE YOU ACTUALLY HAD ANY THOUGHTS OF KILLING YOURSELF IN THE PAST MONTH?: NO

## 2024-11-05 NOTE — ED PROVIDER NOTES
Emergency Department Encounter   NAME: Ruy Gibbs ; AGE: 79 year old male ; YOB: 1945 ; MRN: [id number] ; PCP: Gerald Mota   ED PROVIDER: Pam Butterfield PA-C    Evaluation Date & Time:   No admission date for patient encounter.    CHIEF COMPLAINT:  Irregular Heart Beat      Impression and Plan   MDM: Ruy Gibbs is a 79 year old male who presents to the ED for evaluation of ***.       Medical Decision Making  Obtained supplemental history:Supplemental history obtained?: No  Reviewed external records: {External records reviewed?:444324}  Care impacted by chronic illness:Diabetes, Hyperlipidemia, and Hypertension  {Did you consider but not order tests?:467996}  {Did you interpret images independently?:601327}  Consultation discussion with other provider:{Did you involve another provider (consultant, MH, pharmacy, etc.)?:326708}  {ADMIT VS D/C:134558}    MIPS: {ECC MIPS DOCUMENTATION:938638}      ED COURSE:  11:41 AM I met and introduced myself to the patient. I gathered initial history and performed my physical exam. We discussed plan for initial workup.   11:41 AM I have staffed the patient with Sander Dockery, ED MD, who has evaluated the patient and agrees with all aspects of today's care.   *** I rechecked the patient and discussed results, discharge, follow up, and reasons to return to the ED.     At the conclusion of the encounter I discussed the results of all the tests and the disposition. The questions were answered. The patient or family acknowledged understanding and was agreeable with the care plan.    FINAL IMPRESSION:  No diagnosis found.      MEDICATIONS GIVEN IN THE EMERGENCY DEPARTMENT:  Medications - No data to display      NEW PRESCRIPTIONS STARTED AT TODAY'S ED VISIT:  New Prescriptions    No medications on file         HPI   Use of Intrepreter: N/A     Ruy Gibbs is a 79 year old male with a pertinent history of hyperlipidemia, type 2 diabetes, hypertension  who presents to the ED by personal vehicle for evaluation of an Irregular heart beat.     Patient presets to the ED for concerns of A-Fib for an unknown amount of time. He was at an outpatient surgery center for an ablation of a nerve in his back and they determined that he was in A-fib. He does not have any symptoms other than the lower back pain that his ablation was for. He states that a similar situation happened around x10 years ago when he was getting gallbladder surgery and he was determined to be in A-fib. It was treated medicinally in the past. He is not currently on any medication for this. He is not anticoagulated.      Patient denies any chest pain, shortness of breath, and dizziness. Patient states no other complaints or concerns at this time.       Medical History     Past Medical History:   Diagnosis Date    Benign essential hypertension 2018    Diabetes mellitus type 2, noninsulin dependent (H) 2018    Hyperlipemia     Reflux esophagitis        Past Surgical History:   Procedure Laterality Date    CHOLECYSTECTOMY  2014    dr. Way at [facility]    HC REMOVAL OF TONSILS,<13 Y/O      Description: Tonsillectomy;  Recorded: 2008;    INGUINAL HERNIA REPAIR Left 2016    Procedure: LEFT INGUINAL HERNIA REPAIR;  Surgeon: Drew Way MD;  Location: [facility];  Service:     OTHER [facility]ORY [facility]    TONSILLECTOMY         Family History   Problem Relation Age of Onset    Prostate Cancer Father 71.00        early 70s       Social History     Tobacco Use    Smoking status: Former     Current packs/day: 0.00     Types: Cigarettes     Quit date: 1969     Years since quittin.8     Passive exposure: Never    Smokeless tobacco: Never   Vaping Use    Vaping status: Never Used   Substance Use Topics    Alcohol use: Yes     Alcohol/week: 1.0 standard  drink of alcohol     Comment: Alcoholic Drinks/day: occassional    Drug use: No       atorvastatin (LIPITOR) 40 MG tablet  Chondroitin Sulfate 400 MG CAPS  cyclobenzaprine (FLEXERIL) 5 MG tablet  Glucos-MSM-C-Pp-Loohsy-Yfjrsn (GLUCOSAMINE MSM COMPLEX) TABS tablet  losartan (COZAAR) 100 MG tablet  multivitamin therapeutic (THERAGRAN) tablet  omeprazole (PRILOSEC) 20 MG DR capsule          Physical Exam     First Vitals:  Patient Vitals for the past 24 hrs:   BP Temp Temp src Pulse Resp SpO2   11/05/24 1053 138/84 -- -- -- -- --   11/05/24 1052 -- 97.8  F (36.6  C) Oral 111 18 98 %         PHYSICAL EXAM:   Physical Exam    Constitutional: alert, *** no acute distress, *** not ill-appearing  Head: normocephalic, atraumatic  Eyes: EOM intact   Neck: ROM normal  Cardio: regular rate  Pulmonary: effort normal   Abdominal: flat, no distention  MSK: no obvious swelling or deformity  Skin: no visible rashes or erythema   Neuro: no gross focal deficit, acting per baseline   Psychiatric: mood and behavior within normal limit    Results     LAB:  All pertinent labs reviewed and interpreted  Labs Ordered and Resulted from Time of ED Arrival to Time of ED Departure   CBC WITH PLATELETS AND DIFFERENTIAL       Result Value    WBC Count 7.3      RBC Count 4.94      Hemoglobin 14.8      Hematocrit 45.9      MCV 93      MCH 30.0      MCHC 32.2      RDW 13.1      Platelet Count 181      % Neutrophils 67      % Lymphocytes 21      % Monocytes 8      % Eosinophils 4      % Basophils 0      % Immature Granulocytes 0      NRBCs per 100 WBC 0      Absolute Neutrophils 4.9      Absolute Lymphocytes 1.6      Absolute Monocytes 0.6      Absolute Eosinophils 0.3      Absolute Basophils 0.0      Absolute Immature Granulocytes 0.0      Absolute NRBCs 0.0     BASIC METABOLIC PANEL   MAGNESIUM   TROPONIN T, HIGH SENSITIVITY   TSH WITH FREE T4 REFLEX   NT PROBNP INPATIENT        RADIOLOGY:  No orders to display       EKG results reviewed and  interpreted by  ***, ED MD.     PROCEDURES:  ***      I, Nando Saini, am serving as a scribe to document services personally performed by Pam Butterfield PA-C, based on my observation and the provider's statements to me. I, Pam Butterfield PA-C attest that Nando Saini is acting in a scribe capacity, has observed my performance of the services and has documented them in accordance with my direction.       Pam Butterfiled PA-C   Emergency Medicine   [facility] EMERGENCY ROOM

## 2024-11-05 NOTE — ED NOTES
Writer went through discharge instructions and discharge education with patient. Patient verbalized understanding and denied questions. Electronic script sent to [facility] pharmacy. Patient stated understanding of new medication and denied questions.

## 2024-11-05 NOTE — DISCHARGE INSTRUCTIONS
As we discussed today, we will start you on a blood thinner for your atrial fibrillation.  This blood thinner is called Eliquis. You will take 5 mg of this twice daily - one in the morning and one at night.   Know that while taking this medication, you are more at risk for bleeding and will bleed more easily.  It is important that you follow-up with the cardiology clinic.  I have placed the referral to the rapid access cardiology clinic today.  They will call you early this week.  If they do not call you within the next 2 days, please call them at [phone].     In the meantime, return to the emergency department for any development of symptoms including heart fluttering, chest pain, shortness of breath, other concerning symptoms.

## 2024-11-05 NOTE — ED TRIAGE NOTES
Pt with hx of afib in the past but not on anything for it including no anticoagulants was at outpatient surgery center for ablation of nerve in his back and noted to be in afib. No CP or SOB. Did not know he was in afib. Sent to ER for further eval.      Triage Assessment (Adult)       Row Name 11/05/24 1053          Triage Assessment    Airway WDL WDL        Respiratory WDL    Respiratory WDL WDL        Skin Circulation/Temperature WDL    Skin Circulation/Temperature WDL WDL        Cardiac WDL    Cardiac WDL X;rhythm     Pulse Rate & Regularity apical pulse irregular     Cardiac Rhythm Atrial fibrillation        Peripheral/Neurovascular WDL    Peripheral Neurovascular WDL WDL        Cognitive/Neuro/Behavioral WDL    Cognitive/Neuro/Behavioral WDL WDL

## 2024-11-06 ENCOUNTER — PATIENT OUTREACH (OUTPATIENT)
Dept: FAMILY MEDICINE | Facility: CLINIC | Age: 79
End: 2024-11-06
Payer: COMMERCIAL

## 2024-11-06 NOTE — TELEPHONE ENCOUNTER
Patient called back to the clinic, on 11/6/24, to discuss hospital follow up screening questions after and ED visit yesterday, to the [facility] ED for new onset atrial fibrillation.    Patient has an appointment scheduled tomorrow, Thursday, 11/7/24, Lynsey Peralta MD, at 11:20 am, at the [facility].    Denies other questions or concerns at this time.      Transitions of Care Outreach  Chief Complaint   Patient presents with    Hospital F/U    Call Back       Most Recent Admission Date: 11/5/2024   Most Recent Admission Diagnosis:  Irregular Heart Beat    Most Recent Discharge Date: 11/5/2024   Most Recent Discharge Diagnosis: New onset atrial fibrillation (H) - I48.91     Transitions of Care Assessment    Discharge Assessment  How are you doing now that you are home?: not too bad  How are your symptoms? (Red Flag symptoms escalate to triage hotline per guidelines): Unchanged  Do you know how to contact your clinic care team if you have future questions or changes to your health status? : Yes  Does the patient have their discharge instructions? : Yes  Does the patient have questions regarding their discharge instructions? : No  Were you started on any new medications or were there changes to any of your previous medications? : Yes  Does the patient have all of their medications?: No (see comment)  Do you have questions regarding any of your medications? : No  Do you have all of your needed medical supplies or equipment (DME)?  (i.e. oxygen tank, CPAP, cane, etc.): Yes    Follow up Plan     Discharge Follow-Up  Discharge follow up appointment scheduled in alignment with recommended follow up timeframe or Transitions of Risk Category? (Low = within 30 days; Moderate= within 14 days; High= within 7 days): Yes  Discharge Follow Up Appointment Date: 11/07/24  Discharge Follow Up Appointment Scheduled with?: Specialty Care Provider    Future Appointments   Date Time Provider Department  Center   11/7/2024 11:20 AM Lynsey Peralta MD [facility]   1/13/2025 10:00 AM Gerald Mota, ARANZA [facility]       Outpatient Plan as outlined on AVS reviewed with patient.    For any urgent concerns, please contact our 24 hour nurse triage line: [phone] ([phone])       Writer will route the above information to the PCP to review.    Shannon Avila, RN, BSN  [facility]

## 2024-11-06 NOTE — TELEPHONE ENCOUNTER
MATEO Marie  called Ruy Gibbs  on November 6, 2024 and left a message to return the call to the clinic for Hospital Discharge Follow Up. If patient calls back, please route to Benavides RN.    Cynthia Erickson RN  [facility]    Notes for RN in case of return call from patient or second attempt call:  Discharged on 11/5/24 from [facility].  ER or Hospital? ED  Diagnosis for visit: Irregular heartbeat  Other information if necessary:     Delete this note after TCM call documentation is completed.

## 2024-11-07 ENCOUNTER — OFFICE VISIT (OUTPATIENT)
Dept: CARDIOLOGY | Facility: CLINIC | Age: 79
End: 2024-11-07
Payer: COMMERCIAL

## 2024-11-07 VITALS
DIASTOLIC BLOOD PRESSURE: 74 MMHG | HEIGHT: 75 IN | SYSTOLIC BLOOD PRESSURE: 116 MMHG | RESPIRATION RATE: 12 BRPM | BODY MASS INDEX: 26.56 KG/M2 | WEIGHT: 213.6 LBS | HEART RATE: 76 BPM

## 2024-11-07 DIAGNOSIS — I48.91 NEW ONSET ATRIAL FIBRILLATION (H): ICD-10-CM

## 2024-11-07 DIAGNOSIS — I10 HYPERTENSION, UNSPECIFIED TYPE: Primary | ICD-10-CM

## 2024-11-07 LAB
ATRIAL RATE - MUSE: 73 BPM
DIASTOLIC BLOOD PRESSURE - MUSE: NORMAL MMHG
INTERPRETATION ECG - MUSE: NORMAL
P AXIS - MUSE: 30 DEGREES
PR INTERVAL - MUSE: 186 MS
QRS DURATION - MUSE: 94 MS
QT - MUSE: 406 MS
QTC - MUSE: 447 MS
R AXIS - MUSE: 48 DEGREES
SYSTOLIC BLOOD PRESSURE - MUSE: NORMAL MMHG
T AXIS - MUSE: 75 DEGREES
VENTRICULAR RATE- MUSE: 73 BPM

## 2024-11-07 PROCEDURE — 93000 ELECTROCARDIOGRAM COMPLETE: CPT | Performed by: INTERNAL MEDICINE

## 2024-11-07 PROCEDURE — 99204 OFFICE O/P NEW MOD 45 MIN: CPT | Performed by: INTERNAL MEDICINE

## 2024-11-07 NOTE — LETTER
11/7/2024    Gerald Mota PA-C  [address]  [place] [place] [address]    RE: Ruy SPRAGUE Sai       Dear Colleague,     I had the pleasure of seeing Ruy Gibbs in the [facility].         [facility] [address], [place] [address]   [email or web address]   OFFICE: [phone]          Thank you Dr. Butterfield for asking the [facility] team to participate in the care of your patient, Ruy Gibbs.     Impression and Plan     1.  Atrial fibrillation.  As noted below, Ruy was recently seen in the Emergency Department by Dr. Sander Gaspar on 5 November 2024. He was at an outpatient surgery center for an ablation of a nerve in his back and they determined that he was in atrial fibrillation.  Ruy was unaware of the rhythm disturbance.  He had a similar experience approximately 10 years prior when having cholecystectomy at which time he was found to have atrial fibrillation. Ruy's [id number] Score is 3 yielding an annual embolic risk of 3.2-4.6%. He was started on apixaban 5 mg twice daily.      Twelve-lead ECG in the office today reveals conversion back to sinus rhythm.  Plan:  Continue apixaban 5 mg twice daily.  Echocardiogram.    2.  Hypertension.  Blood pressure is reasonable in the office today at 116/74 mmHg.  Continue losartan 100 mg daily.    Follow-up and further recommendations pending test results.    35 minutes spent reviewing prior records (including documentation, laboratory studies, cardiac testing/imaging), interview with patient along with physical exam, planning, and subsequent documentation/crafting of note).           History of Present Illness    Once again I would like to thank you again for asking me to participate in the care of your patient, Ruy Gibbs.  As you know, but to reiterate for my own records, Ruy Gibbs is a 79 year old male recently seen in the Emergency  "Department by Dr. Sander Gaspar on 5 November 2024. He was at an outpatient surgery center for an ablation of a nerve in his back and they determined that he was in atrial fibrillation.  Patient was unaware of the rhythm disturbance.  He had a similar experience approximately 10 years prior when having cholecystectomy at which time he was found to have atrial fibrillation.  He was started on apixaban 5 mg twice daily.    Further review of systems is otherwise negative/noncontributory (medical record and 13 point review of systems reviewed as well and pertinent positives noted).         Cardiac Diagnostics      Twelve-lead ECG (personally reviewed) 7 November 2024: Sinus rhythm with heart rate of 73 bpm.    Twelve-lead ECG (personally reviewed) 5 November 2024: Atrial fibrillation with heart rate of 94 bpm.    Twelve-lead ECG (personally reviewed) 12 December 2022: Sinus rhythm with PACs.         Physical Examination       /74 (BP Location: Left arm, Patient Position: Sitting, Cuff Size: Adult Large)   Pulse 76   Resp 12   Ht 1.905 m (6' 3\")   Wt 96.9 kg (213 lb 9.6 oz)   BMI 26.70 kg/m          Wt Readings from Last 3 Encounters:   11/07/24 96.9 kg (213 lb 9.6 oz)   07/12/24 98 kg (216 lb)   01/10/24 99.3 kg (219 lb)     The patient is alert and oriented times three. Sclerae are anicteric. Mucosal membranes are moist. Jugular venous pressure is normal. No significant adenopathy/thyromegally appreciated. Lungs are clear with good expansion. On cardiovascular exam, the patient has a fairly regular S1 and S2. Abdomen is soft and non-tender. Extremities reveal no clubbing, cyanosis, or edema.         Family History/Social History/Risk Factors   Patient does not smoke.  Family history reviewed, and family history includes Prostate Cancer (age of onset: 71.00) in his father.          Medical History  Surgical History Family History Social History   Past Medical History:   Diagnosis Date     Benign essential " hypertension 2018     Diabetes mellitus type 2, noninsulin dependent (H) 2018     Hyperlipemia      Reflux esophagitis      Past Surgical History:   Procedure Laterality Date     CHOLECYSTECTOMY  2014    dr. [facility]     HC REMOVAL OF TONSILS,<13 Y/O      Description: Tonsillectomy;  Recorded: 2008;     INGUINAL HERNIA REPAIR Left 2016    Procedure: LEFT INGUINAL HERNIA REPAIR;  Surgeon: Drew Way MD;  Location: [facility];  Service:      OTHER SURGICAL HISTORY [facility] also right inferior oblique myomectomy at same time     TONSILLECTOMY       Family History   Problem Relation Age of Onset     Prostate Cancer Father 71.00        early 70s        Social History     Socioeconomic History     Marital status:      Spouse name: Not on file     Number of children: Not on file     Years of education: Not on file     Highest education level: Not on file   Occupational History     Not on file   Tobacco Use     Smoking status: Former     Current packs/day: 0.00     Types: Cigarettes     Quit date: 1969     Years since quittin.8     Passive exposure: Never     Smokeless tobacco: Never   Vaping Use     Vaping status: Never Used   Substance and Sexual Activity     Alcohol use: Yes     Alcohol/week: 1.0 standard drink of alcohol     Comment: Alcoholic Drinks/day: occassional     Drug use: No     Sexual activity: Yes   Other Topics Concern     Not on file   Social History Narrative     Not on file     Social Drivers of Health     Financial Resource Strain: Low Risk  (2024)    Financial Resource Strain      Within the past 12 months, have you or your family members you live with been unable to get utilities (heat, electricity) when it was really needed?: No   Food Insecurity: Low Risk  (2024)    Food Insecurity      Within the past 12 months, did you worry that your food would run out before you  got money to buy more?: No      Within the past 12 months, did the food you bought just not last and you didn t have money to get more?: No   Transportation Needs: Low Risk  (7/8/2024)    Transportation Needs      Within the past 12 months, has lack of transportation kept you from medical appointments, getting your medicines, non-medical meetings or appointments, work, or from getting things that you need?: No   Physical Activity: Insufficiently Active (7/8/2024)    Exercise Vital Sign      Days of Exercise per Week: 3 days      Minutes of Exercise per Session: 30 min   Stress: No Stress Concern Present (7/8/2024)    [place] [employer] - Occupational Stress Questionnaire      Feeling of Stress : Not at all   Social Connections: Unknown (7/8/2024)    Social Connection and Isolation Panel [NHANES]      Frequency of Communication with Friends and Family: Not on file      Frequency of Social Gatherings with Friends and Family: Twice a week      Attends Hoahaoism Services: Not on file      Active Member of Clubs or Organizations: Not on file      Attends Club or Organization Meetings: Not on file      Marital Status: Not on file   Interpersonal Safety: Low Risk  (7/12/2024)    Interpersonal Safety      Do you feel physically and emotionally safe where you currently live?: Yes      Within the past 12 months, have you been hit, slapped, kicked or otherwise physically hurt by someone?: No      Within the past 12 months, have you been humiliated or emotionally abused in other ways by your partner or ex-partner?: No   Housing Stability: Low Risk  (7/8/2024)    Housing Stability      Do you have housing? : Yes      Are you worried about losing your housing?: No           Medications  Allergies   Current Outpatient Medications   Medication Sig Dispense Refill     apixaban ANTICOAGULANT (ELIQUIS) 5 MG tablet Take 1 tablet (5 mg) by mouth 2 times daily. 60 tablet 0     atorvastatin (LIPITOR) 40 MG tablet  "TAKE ONE TABLET BY MOUTH ONE TIME DAILY 90 tablet 2     Chondroitin Sulfate 400 MG CAPS Take 1,200 mg by mouth daily.       cyclobenzaprine (FLEXERIL) 5 MG tablet Take 1-2 tablets (5-10 mg) by mouth 3 times daily as needed for muscle spasms 30 tablet 2     Glucos-MSM-C-Xs-Olsavg-Altwuz (GLUCOSAMINE MSM COMPLEX) TABS tablet Take 2 tablets by mouth daily.       losartan (COZAAR) 100 MG tablet Take 1 tablet (100 mg) by mouth daily 90 tablet 1     multivitamin therapeutic (THERAGRAN) tablet [MULTIVITAMIN THERAPEUTIC (THERAGRAN) TABLET] Take 1 tablet by mouth daily.       omeprazole (PRILOSEC) 20 MG DR capsule Take 1 capsule (20 mg) by mouth every other day Take first thing in AM on empty stomach 90 capsule 1     No Known Allergies       Lab Results    Chemistry/lipid CBC Cardiac Enzymes/BNP/TSH/INR   Recent Labs   Lab Test 01/10/24  1200   CHOL 130   HDL 42   LDL 67   TRIG 105     Recent Labs   Lab Test 01/10/24  1200 11/30/22  0959 09/30/21  1236   LDL 67 64 68     Recent Labs   Lab Test 11/05/24  1125      POTASSIUM 3.7   CHLORIDE 105   CO2 26   *   BUN 20.0   CR 0.84   GFRESTIMATED 89   MITALI 9.2     Recent Labs   Lab Test 11/05/24  1125 07/12/24  1127 01/10/24  1200   CR 0.84 0.82 0.81     Recent Labs   Lab Test 07/12/24  1127 01/10/24  1200 05/30/23  0855   A1C 6.6* 6.7* 6.8*          Recent Labs   Lab Test 11/05/24  1125   WBC 7.3   HGB 14.8   HCT 45.9   MCV 93        Recent Labs   Lab Test 11/05/24  1125 01/10/24  1200 11/30/22  0959   HGB 14.8 15.3 15.1    No results for input(s): \"TROPONINI\" in the last 74472 hours.  Recent Labs   Lab Test 11/05/24  1125   NTBNPI 104     Recent Labs   Lab Test 11/05/24  1125   TSH 0.99     No results for input(s): \"INR\" in the last 34340 hours.       Medications  Allergies   Current Outpatient Medications   Medication Sig Dispense Refill     apixaban ANTICOAGULANT (ELIQUIS) 5 MG tablet Take 1 tablet (5 mg) by mouth 2 times daily. 60 tablet 0     atorvastatin " "(LIPITOR) 40 MG tablet TAKE ONE TABLET BY MOUTH ONE TIME DAILY 90 tablet 2     Chondroitin Sulfate 400 MG CAPS Take 1,200 mg by mouth daily.       cyclobenzaprine (FLEXERIL) 5 MG tablet Take 1-2 tablets (5-10 mg) by mouth 3 times daily as needed for muscle spasms 30 tablet 2     Glucos-MSM-C-Ku-Rwmkap-Aoaxab (GLUCOSAMINE MSM COMPLEX) TABS tablet Take 2 tablets by mouth daily.       losartan (COZAAR) 100 MG tablet Take 1 tablet (100 mg) by mouth daily 90 tablet 1     multivitamin therapeutic (THERAGRAN) tablet [MULTIVITAMIN THERAPEUTIC (THERAGRAN) TABLET] Take 1 tablet by mouth daily.       omeprazole (PRILOSEC) 20 MG DR capsule Take 1 capsule (20 mg) by mouth every other day Take first thing in AM on empty stomach 90 capsule 1      No Known Allergies       Lab Results   Lab Results   Component Value Date     11/05/2024    CO2 26 11/05/2024    CO2 27 05/18/2022    BUN 20.0 11/05/2024    BUN 25 05/18/2022     Lab Results   Component Value Date    WBC 7.3 11/05/2024    HGB 14.8 11/05/2024    HCT 45.9 11/05/2024    MCV 93 11/05/2024     11/05/2024     Lab Results   Component Value Date    CHOL 130 01/10/2024    TRIG 105 01/10/2024    HDL 42 01/10/2024     No results found for: \"INR\"  No results found for: \"BNP\"  No results found for: \"CKTOTAL\", \"CKMB\", \"TROPONINI\"  Lab Results   Component Value Date    TSH 0.99 11/05/2024                      Thank you for allowing me to participate in the care of your patient.      Sincerely,     Lynsey Peralta MD     [facility]  cc:   Pam Butterfield PA-C  [facility] [id number] [place] [address]      "

## 2024-11-07 NOTE — PROGRESS NOTES
[facility] HEART CARE   [address], [place] [address]   [email or web address]   OFFICE: [phone]          Thank you Dr. Butterfield for asking the [facility] Heart Care team to participate in the care of your patient, Ruy Gibbs.     Impression and Plan     1.  Atrial fibrillation.  As noted below, Ruy was recently seen in the Emergency Department by Dr. Sander Gaspar on 5 November 2024. He was at an outpatient surgery center for an ablation of a nerve in his back and they determined that he was in atrial fibrillation.  Ruy was unaware of the rhythm disturbance.  He had a similar experience approximately 10 years prior when having cholecystectomy at which time he was found to have atrial fibrillation. Ruy's KAROL?DS?-VASc Score is 3 yielding an annual embolic risk of 3.2-4.6%. He was started on apixaban 5 mg twice daily.      Twelve-lead ECG in the office today reveals conversion back to sinus rhythm.  Plan:  Continue apixaban 5 mg twice daily.  Echocardiogram.    2.  Hypertension.  Blood pressure is reasonable in the office today at 116/74 mmHg.  Continue losartan 100 mg daily.    Follow-up and further recommendations pending test results.    35 minutes spent reviewing prior records (including documentation, laboratory studies, cardiac testing/imaging), interview with patient along with physical exam, planning, and subsequent documentation/crafting of note).           History of Present Illness    Once again I would like to thank you again for asking me to participate in the care of your patient, Ruy Gibbs.  As you know, but to reiterate for my own records, Ruy Gibbs is a 79 year old male recently seen in the Emergency Department by Dr. Sander Gaspar on 5 November 2024. He was at an outpatient surgery center for an ablation of a nerve in his back and they determined that he was in atrial fibrillation.  Patient was unaware of the rhythm  "disturbance.  He had a similar experience approximately 10 years prior when having cholecystectomy at which time he was found to have atrial fibrillation.  He was started on apixaban 5 mg twice daily.    Further review of systems is otherwise negative/noncontributory (medical record and 13 point review of systems reviewed as well and pertinent positives noted).         Cardiac Diagnostics      Twelve-lead ECG (personally reviewed) 7 November 2024: Sinus rhythm with heart rate of 73 bpm.    Twelve-lead ECG (personally reviewed) 5 November 2024: Atrial fibrillation with heart rate of 94 bpm.    Twelve-lead ECG (personally reviewed) 12 December 2022: Sinus rhythm with PACs.         Physical Examination       /74 (BP Location: Left arm, Patient Position: Sitting, Cuff Size: Adult Large)   Pulse 76   Resp 12   Ht 1.905 m (6' 3\")   Wt 96.9 kg (213 lb 9.6 oz)   BMI 26.70 kg/m          Wt Readings from Last 3 Encounters:   11/07/24 96.9 kg (213 lb 9.6 oz)   07/12/24 98 kg (216 lb)   01/10/24 99.3 kg (219 lb)     The patient is alert and oriented times three. Sclerae are anicteric. Mucosal membranes are moist. Jugular venous pressure is normal. No significant adenopathy/thyromegally appreciated. Lungs are clear with good expansion. On cardiovascular exam, the patient has a fairly regular S1 and S2. Abdomen is soft and non-tender. Extremities reveal no clubbing, cyanosis, or edema.         Family History/Social History/Risk Factors   Patient does not smoke.  Family history reviewed, and family history includes Prostate Cancer (age of onset: 71.00) in his father.          Medical History  Surgical History Family History Social History   Past Medical History:   Diagnosis Date    Benign essential hypertension 12/12/2018    Diabetes mellitus type 2, noninsulin dependent (H) 2/7/2018    Hyperlipemia     Reflux esophagitis      Past Surgical History:   Procedure Laterality Date    CHOLECYSTECTOMY  Jan 2014    dr. Way " at [facility]    HC REMOVAL OF TONSILS,<11 Y/O      Description: Tonsillectomy;  Recorded: 2008;    INGUINAL HERNIA REPAIR Left 2016    Procedure: LEFT INGUINAL HERNIA REPAIR;  Surgeon: Drew Way MD;  Location: [facility] OR;  Service:     OTHER SURGICAL HISTORY [facility]    TONSILLECTOMY       Family History   Problem Relation Age of Onset    Prostate Cancer Father 71.00        early 70s        Social History     Socioeconomic History    Marital status:      Spouse name: Not on file    Number of children: Not on file    Years of education: Not on file    Highest education level: Not on file   Occupational History    Not on file   Tobacco Use    Smoking status: Former     Current packs/day: 0.00     Types: Cigarettes     Quit date: 1969     Years since quittin.8     Passive exposure: Never    Smokeless tobacco: Never   Vaping Use    Vaping status: Never Used   Substance and Sexual Activity    Alcohol use: Yes     Alcohol/week: 1.0 standard drink of alcohol     Comment: Alcoholic Drinks/day: occassional    Drug use: No    Sexual activity: Yes   Other Topics Concern    Not on file   Social History Narrative    Not on file     Social Drivers of Health     Financial Resource Strain: Low Risk  (2024)    Financial Resource Strain     Within the past 12 months, have you or your family members you live with been unable to get utilities (heat, electricity) when it was really needed?: No   Food Insecurity: Low Risk  (2024)    Food Insecurity     Within the past 12 months, did you worry that your food would run out before you got money to buy more?: No     Within the past 12 months, did the food you bought just not last and you didn t have money to get more?: No   Transportation Needs: Low Risk  (2024)    Transportation Needs     Within the past 12 months, has lack of  transportation kept you from medical appointments, getting your medicines, non-medical meetings or appointments, work, or from getting things that you need?: No   Physical Activity: Insufficiently Active (7/8/2024)    Exercise Vital Sign     Days of Exercise per Week: 3 days     Minutes of Exercise per Session: 30 min   Stress: No Stress Concern Present (7/8/2024)    [employer] - Occupational Stress Questionnaire     Feeling of Stress : Not at all   Social Connections: Unknown (7/8/2024)    Social Connection and Isolation Panel [NHANES]     Frequency of Communication with Friends and Family: Not on file     Frequency of Social Gatherings with Friends and Family: Twice a week     Attends [employer] Services: Not on file     Active Member of Clubs or Organizations: Not on file     Attends Club or Organization Meetings: Not on file     Marital Status: Not on file   Interpersonal Safety: Low Risk  (7/12/2024)    Interpersonal Safety     Do you feel physically and emotionally safe where you currently live?: Yes     Within the past 12 months, have you been hit, slapped, kicked or otherwise physically hurt by someone?: No     Within the past 12 months, have you been humiliated or emotionally abused in other ways by your partner or ex-partner?: No   Housing Stability: Low Risk  (7/8/2024)    Housing Stability     Do you have housing? : Yes     Are you worried about losing your housing?: No           Medications  Allergies   Current Outpatient Medications   Medication Sig Dispense Refill    apixaban ANTICOAGULANT (ELIQUIS) 5 MG tablet Take 1 tablet (5 mg) by mouth 2 times daily. 60 tablet 0    atorvastatin (LIPITOR) 40 MG tablet TAKE ONE TABLET BY MOUTH ONE TIME DAILY 90 tablet 2    Chondroitin Sulfate 400 MG CAPS Take 1,200 mg by mouth daily.      cyclobenzaprine (FLEXERIL) 5 MG tablet Take 1-2 tablets (5-10 mg) by mouth 3 times daily as needed for muscle spasms 30 tablet 2     "Glucos-MSM-C-Cl-Atcbvl-Dsmxwf (GLUCOSAMINE MSM COMPLEX) TABS tablet Take 2 tablets by mouth daily.      losartan (COZAAR) 100 MG tablet Take 1 tablet (100 mg) by mouth daily 90 tablet 1    multivitamin therapeutic (THERAGRAN) tablet [MULTIVITAMIN THERAPEUTIC (THERAGRAN) TABLET] Take 1 tablet by mouth daily.      omeprazole (PRILOSEC) 20 MG DR capsule Take 1 capsule (20 mg) by mouth every other day Take first thing in AM on empty stomach 90 capsule 1     No Known Allergies       Lab Results    Chemistry/lipid CBC Cardiac Enzymes/BNP/TSH/INR   Recent Labs   Lab Test 01/10/24  1200   CHOL 130   HDL 42   LDL 67   TRIG 105     Recent Labs   Lab Test 01/10/24  1200 11/30/22  0959 09/30/21  1236   LDL 67 64 68     Recent Labs   Lab Test 11/05/24  1125      POTASSIUM 3.7   CHLORIDE 105   CO2 26   *   BUN 20.0   CR 0.84   GFRESTIMATED 89   MITALI 9.2     Recent Labs   Lab Test 11/05/24  1125 07/12/24  1127 01/10/24  1200   CR 0.84 0.82 0.81     Recent Labs   Lab Test 07/12/24  1127 01/10/24  1200 05/30/23  0855   A1C 6.6* 6.7* 6.8*          Recent Labs   Lab Test 11/05/24  1125   WBC 7.3   HGB 14.8   HCT 45.9   MCV 93        Recent Labs   Lab Test 11/05/24  1125 01/10/24  1200 11/30/22  0959   HGB 14.8 15.3 15.1    No results for input(s): \"TROPONINI\" in the last 03123 hours.  Recent Labs   Lab Test 11/05/24  1125   NTBNPI 104     Recent Labs   Lab Test 11/05/24  1125   TSH 0.99     No results for input(s): \"INR\" in the last 25093 hours.       Medications  Allergies   Current Outpatient Medications   Medication Sig Dispense Refill    apixaban ANTICOAGULANT (ELIQUIS) 5 MG tablet Take 1 tablet (5 mg) by mouth 2 times daily. 60 tablet 0    atorvastatin (LIPITOR) 40 MG tablet TAKE ONE TABLET BY MOUTH ONE TIME DAILY 90 tablet 2    Chondroitin Sulfate 400 MG CAPS Take 1,200 mg by mouth daily.      cyclobenzaprine (FLEXERIL) 5 MG tablet Take 1-2 tablets (5-10 mg) by mouth 3 times daily as needed for muscle spasms " "30 tablet 2    Glucos-MSM-C-Sc-Acwxoa-Rstoyz (GLUCOSAMINE MSM COMPLEX) TABS tablet Take 2 tablets by mouth daily.      losartan (COZAAR) 100 MG tablet Take 1 tablet (100 mg) by mouth daily 90 tablet 1    multivitamin therapeutic (THERAGRAN) tablet [MULTIVITAMIN THERAPEUTIC (THERAGRAN) TABLET] Take 1 tablet by mouth daily.      omeprazole (PRILOSEC) 20 MG DR capsule Take 1 capsule (20 mg) by mouth every other day Take first thing in AM on empty stomach 90 capsule 1      No Known Allergies       Lab Results   Lab Results   Component Value Date     11/05/2024    CO2 26 11/05/2024    CO2 27 05/18/2022    BUN 20.0 11/05/2024    BUN 25 05/18/2022     Lab Results   Component Value Date    WBC 7.3 11/05/2024    HGB 14.8 11/05/2024    HCT 45.9 11/05/2024    MCV 93 11/05/2024     11/05/2024     Lab Results   Component Value Date    CHOL 130 01/10/2024    TRIG 105 01/10/2024    HDL 42 01/10/2024     No results found for: \"INR\"  No results found for: \"BNP\"  No results found for: \"CKTOTAL\", \"CKMB\", \"TROPONINI\"  Lab Results   Component Value Date    TSH 0.99 11/05/2024                  "

## 2024-11-08 LAB
ATRIAL RATE - MUSE: 131 BPM
DIASTOLIC BLOOD PRESSURE - MUSE: 84 MMHG
INTERPRETATION ECG - MUSE: NORMAL
P AXIS - MUSE: NORMAL DEGREES
PR INTERVAL - MUSE: NORMAL MS
QRS DURATION - MUSE: 98 MS
QT - MUSE: 364 MS
QTC - MUSE: 455 MS
R AXIS - MUSE: -13 DEGREES
SYSTOLIC BLOOD PRESSURE - MUSE: 138 MMHG
T AXIS - MUSE: 83 DEGREES
VENTRICULAR RATE- MUSE: 94 BPM

## 2024-11-22 ENCOUNTER — HOSPITAL ENCOUNTER (OUTPATIENT)
Dept: CARDIOLOGY | Facility: CLINIC | Age: 79
Discharge: HOME | End: 2024-11-22
Attending: INTERNAL MEDICINE | Admitting: INTERNAL MEDICINE
Payer: COMMERCIAL

## 2024-11-22 DIAGNOSIS — I48.91 NEW ONSET ATRIAL FIBRILLATION (H): ICD-10-CM

## 2024-11-22 PROCEDURE — 93306 TTE W/DOPPLER COMPLETE: CPT

## 2024-11-22 PROCEDURE — 93306 TTE W/DOPPLER COMPLETE: CPT | Mod: 26 | Performed by: INTERNAL MEDICINE

## 2024-11-25 DIAGNOSIS — I48.91 NEW ONSET ATRIAL FIBRILLATION (H): Primary | ICD-10-CM

## 2024-11-25 DIAGNOSIS — I10 BENIGN ESSENTIAL HYPERTENSION: ICD-10-CM

## 2024-11-25 RX ORDER — LOSARTAN POTASSIUM 50 MG/1
50 TABLET ORAL DAILY
Status: SHIPPED
Start: 2024-11-25

## 2024-11-25 RX ORDER — METOPROLOL SUCCINATE 50 MG/1
50 TABLET, EXTENDED RELEASE ORAL DAILY
Qty: 90 TABLET | Refills: 1 | Status: SHIPPED | OUTPATIENT
Start: 2024-11-25

## 2024-11-26 NOTE — H&P (VIEW-ONLY)
[facility]  Cardiac Electrophysiology  [address]  [place] [address]   Office: [phone]  Fax: [phone]     HEART CARE ELECTROPHYSIOLOGY NOTE    Primary care: Gerald Mota MD  Primary cardiologist: Dr. Peralta    REASON FOR VISIT: Paroxysma/persistent atrial fibrillation      Assessment/Recommendations     Paroxysmal/persistent atrial fibrillation/: he recently underwent work up for ablation of a nerve in his back, and they found he is in atrial fibrillation.  He was asymptomatic and unaware.  He had a similar experience about 10 years ago when he had a cholecystectomy, he was found to be in atrial fibrillation.  He was sent to the ER for further evaluation.  He was started on Eliquis 5 mg twice daily and followed up with Dr. Peralta, where he was in sinus rhythm.  Following the echocardiogram, he was in recurrent atrial fibrillation with mildly elevated ventricular response, and was started on metoprolol 50 mg daily    [id number] score of 4 for age >75 and hypertension, diabetes (non-insulin dependent)    He continues to be asymptomatic of his atrial fibrillation. He was unaware that he is in it today.     We discussed the pathophysiology and natural progression of atrial fibrillation, management including stroke risk reduction, rate control, antiarrhythmic drug therapy, and consideration of catheter ablation, with maintenance of sinus rhythm associated with reduced mortality, stroke, and heart failure- and acute coronary syndrome-related hospitalizations. Discussed catheter ablation procedure for atrial fibrillation as a long-term management strategy and superiority to medical therapy . He was given information to review at home. We also discussed long term management of atrial fibrillation includes sleep apnea diagnosis and management, avoiding heavy alcohol consumption, and management of concurrent hypertension and coronary artery disease as  appropriate.    Hypertension: controlled, continue current regimen    Plan:  Continue metoprolol for now for rate control  Continue eliquis 5 mg BID for stroke prophylaxis  Will set up for cardioversion - will start sotalol 80 mg BID 2 days prior to DCCV.   EP MD follow up for consideration of ablation.        History of Present Illness/Subjective    Ruy Gibbs is a 79 year old male who is seen today for evaluation of atrial fibrillation. He has a past medical history significant for paroxysmal atrial fibrillation and hypertension.  He has had a history of atrial fibrillation following an outpatient surgery for a nerve in his back, and roughly 10 years ago while having a cholecystectomy.    He is completely asymptomatic with atrial fibrillation.  He was unaware that he was in atrial flutter this morning.  He denies chest discomfort, palpitations, shortness of breath, lightheadedness/dizziness, pedal edema, or syncope.       Data Review     Arrhythmia hx:  Sx: asymptomatic  Dx/date: 2014  Rate control: Metoprolol 50 mg daily  AAD: None  DCCV: None  Ablation: None  [id number] at least 3,  OAC: Eliquis twice daily    EKG personally reviewed.   11/7/2024: Sinus rhythm 72 bpm, QT/QTc 406/447 ms   11/5/2024: Atrial fibrillation 94 bpm  2/12/2022: Sinus bradycardia 56 bpm    TTE 11/22/2024:  1. The left ventricle is normal in size. Left ventricular systolic performance  is at the lower limits of normal. The ejection fraction is estimated to be  50%.  2. No significant valvular heart disease is identified on this study.  3. Normal right ventricular size with low normal right ventricular systolic  performance.  4. There is mild left atrial enlargement.     Additional comments: Patient appears to be in atrial fibrillation with mildly  elevated ventricular response    I have reviewed and updated the patient's past medical history, allergy list and medication list.          Physical Examination Review of Systems   BMI=  Body mass index is 26.25 kg/m .    Wt Readings from Last 3 Encounters:   11/27/24 95.3 kg (210 lb)   11/07/24 96.9 kg (213 lb 9.6 oz)   07/12/24 98 kg (216 lb)       Vitals: /80 (BP Location: Left arm, Patient Position: Sitting, Cuff Size: Adult Large)   Pulse 69   Resp 16   Wt 95.3 kg (210 lb)   SpO2 98%   BMI 26.25 kg/m    General   Appearance:   Alert and oriented, in no acute distress.    HEENT:  Normocephalic and atraumatic. Conjunctiva and sclera are clear. Moist oral mucosa.    Neck: No JVP, carotid bruit or obvious thyromegaly.   Lungs:   Respirations unlabored. Clear bilaterally with no rales, rhonchi, or wheezes.     Cardiovascular:   Rhythm is regular. S1 and S2 are normal. No significant murmur is present. Lower extremities demonstrate no significant edema.   Extremities: No cyanosis or clubbing   Skin: Skin is warm, dry, and otherwise intact.   Neurologic: Gait not assessed. Mood and affect appropriate.     ROS: 10 point ROS neg other than the symptoms noted above in the HPI.      Medical History  Surgical History Family History Social History   Past Medical History:   Diagnosis Date    Benign essential hypertension 12/12/2018    Diabetes mellitus type 2, noninsulin dependent (H) 2/7/2018    Hyperlipemia     Reflux esophagitis     Past Surgical History:   Procedure Laterality Date    CHOLECYSTECTOMY  Jan 2014    dr. Way at [facility]    HC REMOVAL OF TONSILS,<13 Y/O      Description: Tonsillectomy;  Recorded: 08/04/2008;    INGUINAL HERNIA REPAIR Left 4/13/2016    Procedure: LEFT INGUINAL HERNIA REPAIR;  Surgeon: Drew Way MD;  Location: [facility];  Service:     OTHER SURGICAL HISTORY [facility]    TONSILLECTOMY      Family History   Problem Relation Age of Onset    Prostate Cancer Father 71.00        early 70s    Social History     Tobacco Use    Smoking status:  "Former     Current packs/day: 0.00     Types: Cigarettes     Quit date: 1969     Years since quittin.9     Passive exposure: Never    Smokeless tobacco: Never   Vaping Use    Vaping status: Never Used   Substance Use Topics    Alcohol use: Yes     Alcohol/week: 1.0 standard drink of alcohol     Comment: Alcoholic Drinks/day: occassional    Drug use: No          Medications  Allergies   Current Outpatient Medications   Medication Sig Dispense Refill    apixaban ANTICOAGULANT (ELIQUIS) 5 MG tablet Take 1 tablet (5 mg) by mouth 2 times daily. 60 tablet 0    atorvastatin (LIPITOR) 40 MG tablet TAKE ONE TABLET BY MOUTH ONE TIME DAILY 90 tablet 2    Chondroitin Sulfate 400 MG CAPS Take 1,200 mg by mouth daily.      cyclobenzaprine (FLEXERIL) 5 MG tablet Take 1-2 tablets (5-10 mg) by mouth 3 times daily as needed for muscle spasms 30 tablet 2    Glucos-MSM-C-Za-Clruoo-Rnzehz (GLUCOSAMINE MSM COMPLEX) TABS tablet Take 2 tablets by mouth daily.      losartan (COZAAR) 50 MG tablet Take 1 tablet (50 mg) by mouth daily.      metoprolol succinate ER (TOPROL XL) 50 MG 24 hr tablet Take 1 tablet (50 mg) by mouth daily. 90 tablet 1    multivitamin therapeutic (THERAGRAN) tablet [MULTIVITAMIN THERAPEUTIC (THERAGRAN) TABLET] Take 1 tablet by mouth daily.      omeprazole (PRILOSEC) 20 MG DR capsule Take 1 capsule (20 mg) by mouth every other day Take first thing in AM on empty stomach 90 capsule 1    No Known Allergies      Lab Results    Chemistry/lipid CBC Cardiac Enzymes/BNP/TSH/INR   Lab Results   Component Value Date    BUN 20.0 2024     2024    CO2 26 2024     No results found for: \"CREATININE\"    Lab Results   Component Value Date    CHOL 130 01/10/2024    HDL 42 01/10/2024    LDL 67 01/10/2024      Lab Results   Component Value Date    WBC 7.3 2024    HGB 14.8 2024    HCT 45.9 2024    MCV 93 2024     2024    Lab Results   Component Value Date    TSH 0.99 " 2024          The longitudinal plan of care for the diagnosis(es)/condition(s) as documented were addressed during this visit. Due to the added complexity in care, I will continue to support Ruy in the subsequent management and with ongoing continuity of care.      This note has been dictated using voice recognition software. Any grammatical, typographical, or context distortions are unintentional and inherent to the software.    Harinder Turner PA-C  Clinical Cardiac Electrophysiology  [facility]  Clinic and [phone]  Fax: [phone]  Electrophysiology Nurses: [phone]

## 2024-11-26 NOTE — PROGRESS NOTES
[facility]  Cardiac Electrophysiology  [address]  [place] [address]   Office: [phone]  Fax: [phone]     HEART CARE ELECTROPHYSIOLOGY NOTE    Primary care: Gerald Mota MD  Primary cardiologist: Dr. Peralta    REASON FOR VISIT: Paroxysma/persistent atrial fibrillation      Assessment/Recommendations     Paroxysmal/persistent atrial fibrillation/: he recently underwent work up for ablation of a nerve in his back, and they found he is in atrial fibrillation.  He was asymptomatic and unaware.  He had a similar experience about 10 years ago when he had a cholecystectomy, he was found to be in atrial fibrillation.  He was sent to the ER for further evaluation.  He was started on Eliquis 5 mg twice daily and followed up with Dr. Peralta, where he was in sinus rhythm.  Following the echocardiogram, he was in recurrent atrial fibrillation with mildly elevated ventricular response, and was started on metoprolol 50 mg daily    [id number] score of 4 for age >75 and hypertension, diabetes (non-insulin dependent)    He continues to be asymptomatic of his atrial fibrillation. He was unaware that he is in it today.     We discussed the pathophysiology and natural progression of atrial fibrillation, management including stroke risk reduction, rate control, antiarrhythmic drug therapy, and consideration of catheter ablation, with maintenance of sinus rhythm associated with reduced mortality, stroke, and heart failure- and acute coronary syndrome-related hospitalizations. Discussed catheter ablation procedure for atrial fibrillation as a long-term management strategy and superiority to medical therapy . He was given information to review at home. We also discussed long term management of atrial fibrillation includes sleep apnea diagnosis and management, avoiding heavy alcohol consumption, and management of concurrent hypertension and coronary artery disease as  appropriate.    Hypertension: controlled, continue current regimen    Plan:  Continue metoprolol for now for rate control  Continue eliquis 5 mg BID for stroke prophylaxis  Will set up for cardioversion - will start sotalol 80 mg BID 2 days prior to DCCV.   EP MD follow up for consideration of ablation.        History of Present Illness/Subjective    Ruy Gibbs is a 79 year old male who is seen today for evaluation of atrial fibrillation. He has a past medical history significant for paroxysmal atrial fibrillation and hypertension.  He has had a history of atrial fibrillation following an outpatient surgery for a nerve in his back, and roughly 10 years ago while having a cholecystectomy.    He is completely asymptomatic with atrial fibrillation.  He was unaware that he was in atrial flutter this morning.  He denies chest discomfort, palpitations, shortness of breath, lightheadedness/dizziness, pedal edema, or syncope.       Data Review     Arrhythmia hx:  Sx: asymptomatic  Dx/date: 2014  Rate control: Metoprolol 50 mg daily  AAD: None  DCCV: None  Ablation: None  [id number] at least 3,  OAC: Eliquis twice daily    EKG personally reviewed.   11/7/2024: Sinus rhythm 72 bpm, QT/QTc 406/447 ms   11/5/2024: Atrial fibrillation 94 bpm  2/12/2022: Sinus bradycardia 56 bpm    TTE 11/22/2024:  1. The left ventricle is normal in size. Left ventricular systolic performance  is at the lower limits of normal. The ejection fraction is estimated to be  50%.  2. No significant valvular heart disease is identified on this study.  3. Normal right ventricular size with low normal right ventricular systolic  performance.  4. There is mild left atrial enlargement.     Additional comments: Patient appears to be in atrial fibrillation with mildly  elevated ventricular response    I have reviewed and updated the patient's past medical history, allergy list and medication list.          Physical Examination Review of Systems   BMI=  Body mass index is 26.25 kg/m .    Wt Readings from Last 3 Encounters:   11/27/24 95.3 kg (210 lb)   11/07/24 96.9 kg (213 lb 9.6 oz)   07/12/24 98 kg (216 lb)       Vitals: /80 (BP Location: Left arm, Patient Position: Sitting, Cuff Size: Adult Large)   Pulse 69   Resp 16   Wt 95.3 kg (210 lb)   SpO2 98%   BMI 26.25 kg/m    General   Appearance:   Alert and oriented, in no acute distress.    HEENT:  Normocephalic and atraumatic. Conjunctiva and sclera are clear. Moist oral mucosa.    Neck: No JVP, carotid bruit or obvious thyromegaly.   Lungs:   Respirations unlabored. Clear bilaterally with no rales, rhonchi, or wheezes.     Cardiovascular:   Rhythm is regular. S1 and S2 are normal. No significant murmur is present. Lower extremities demonstrate no significant edema.   Extremities: No cyanosis or clubbing   Skin: Skin is warm, dry, and otherwise intact.   Neurologic: Gait not assessed. Mood and affect appropriate.     ROS: 10 point ROS neg other than the symptoms noted above in the HPI.      Medical History  Surgical History Family History Social History   Past Medical History:   Diagnosis Date    Benign essential hypertension 12/12/2018    Diabetes mellitus type 2, noninsulin dependent (H) 2/7/2018    Hyperlipemia     Reflux esophagitis     Past Surgical History:   Procedure Laterality Date    CHOLECYSTECTOMY  Jan 2014    dr. Way at [facility]    HC REMOVAL OF TONSILS,<11 Y/O      Description: Tonsillectomy;  Recorded: 08/04/2008;    INGUINAL HERNIA REPAIR Left 4/13/2016    Procedure: LEFT INGUINAL HERNIA REPAIR;  Surgeon: Drew Way MD;  Location: [facility];  Service:     OTHER SURGICAL HISTORY B[facility]11/[facility] also right inferior oblique myomectomy at same time    TONSILLECTOMY      Family History   Problem Relation Age of Onset    Prostate Cancer Father 71.00        early 70s    Social History     Tobacco Use    Smoking status:  "Former     Current packs/day: 0.00     Types: Cigarettes     Quit date: 1969     Years since quittin.9     Passive exposure: Never    Smokeless tobacco: Never   Vaping Use    Vaping status: Never Used   Substance Use Topics    Alcohol use: Yes     Alcohol/week: 1.0 standard drink of alcohol     Comment: Alcoholic Drinks/day: occassional    Drug use: No          Medications  Allergies   Current Outpatient Medications   Medication Sig Dispense Refill    apixaban ANTICOAGULANT (ELIQUIS) 5 MG tablet Take 1 tablet (5 mg) by mouth 2 times daily. 60 tablet 0    atorvastatin (LIPITOR) 40 MG tablet TAKE ONE TABLET BY MOUTH ONE TIME DAILY 90 tablet 2    Chondroitin Sulfate 400 MG CAPS Take 1,200 mg by mouth daily.      cyclobenzaprine (FLEXERIL) 5 MG tablet Take 1-2 tablets (5-10 mg) by mouth 3 times daily as needed for muscle spasms 30 tablet 2    Glucos-MSM-C-Vv-Cixuki-Itynih (GLUCOSAMINE MSM COMPLEX) TABS tablet Take 2 tablets by mouth daily.      losartan (COZAAR) 50 MG tablet Take 1 tablet (50 mg) by mouth daily.      metoprolol succinate ER (TOPROL XL) 50 MG 24 hr tablet Take 1 tablet (50 mg) by mouth daily. 90 tablet 1    multivitamin therapeutic (THERAGRAN) tablet [MULTIVITAMIN THERAPEUTIC (THERAGRAN) TABLET] Take 1 tablet by mouth daily.      omeprazole (PRILOSEC) 20 MG DR capsule Take 1 capsule (20 mg) by mouth every other day Take first thing in AM on empty stomach 90 capsule 1    No Known Allergies      Lab Results    Chemistry/lipid CBC Cardiac Enzymes/BNP/TSH/INR   Lab Results   Component Value Date    BUN 20.0 2024     2024    CO2 26 2024     No results found for: \"CREATININE\"    Lab Results   Component Value Date    CHOL 130 01/10/2024    HDL 42 01/10/2024    LDL 67 01/10/2024      Lab Results   Component Value Date    WBC 7.3 2024    HGB 14.8 2024    HCT 45.9 2024    MCV 93 2024     2024    Lab Results   Component Value Date    TSH 0.99 " 2024          The longitudinal plan of care for the diagnosis(es)/condition(s) as documented were addressed during this visit. Due to the added complexity in care, I will continue to support Ruy in the subsequent management and with ongoing continuity of care.      This note has been dictated using voice recognition software. Any grammatical, typographical, or context distortions are unintentional and inherent to the software.    Harinder Turner PA-C  Clinical Cardiac Electrophysiology  [facility]  Clinic and [phone]  Fax: [phone]  Electrophysiology Nurses: [phone]

## 2024-11-27 ENCOUNTER — DOCUMENTATION ONLY (OUTPATIENT)
Dept: CARDIOLOGY | Facility: CLINIC | Age: 79
End: 2024-11-27

## 2024-11-27 ENCOUNTER — OFFICE VISIT (OUTPATIENT)
Dept: CARDIOLOGY | Facility: CLINIC | Age: 79
End: 2024-11-27
Payer: COMMERCIAL

## 2024-11-27 ENCOUNTER — PREP FOR PROCEDURE (OUTPATIENT)
Dept: CARDIOLOGY | Facility: CLINIC | Age: 79
End: 2024-11-27

## 2024-11-27 ENCOUNTER — TELEPHONE (OUTPATIENT)
Dept: CARDIOLOGY | Facility: CLINIC | Age: 79
End: 2024-11-27

## 2024-11-27 VITALS
OXYGEN SATURATION: 98 % | HEART RATE: 69 BPM | DIASTOLIC BLOOD PRESSURE: 80 MMHG | SYSTOLIC BLOOD PRESSURE: 122 MMHG | RESPIRATION RATE: 16 BRPM | WEIGHT: 210 LBS | BODY MASS INDEX: 26.25 KG/M2

## 2024-11-27 DIAGNOSIS — I48.0 PAROXYSMAL ATRIAL FIBRILLATION (H): Primary | ICD-10-CM

## 2024-11-27 DIAGNOSIS — I48.91 NEW ONSET ATRIAL FIBRILLATION (H): ICD-10-CM

## 2024-11-27 RX ORDER — LIDOCAINE 40 MG/G
CREAM TOPICAL
OUTPATIENT
Start: 2024-11-27

## 2024-11-27 RX ORDER — SOTALOL HYDROCHLORIDE 80 MG/1
80 TABLET ORAL 2 TIMES DAILY
Qty: 90 TABLET | Refills: 1 | Status: SHIPPED | OUTPATIENT
Start: 2024-11-27

## 2024-11-27 NOTE — PROGRESS NOTES
AC: Eliquis NP Med Review: CHANGES- 2 days prior, Stop Metoprolol and Start Sotalol  80mg BID.    DM Meds: None  GLP-1:None     Caesar Turner, PAJuan LuisC  P [facility] Ep Support [facility],    Could we set him up for a cardioversion maybe next week? He has been anticoagulated appropriately for greater than  3 weeks with no missed doses. He just started metoprolol today, but planned to start sotalol 2 days prior to cardioversion. stop metoprolol when he starts sotalol. 80 mg twice daily     ThanksHarinder, 1945, [id number]  Home:[phone] (home) Cell:[phone] (mobile)  Emergency Contact: Ghazal Gibbs [phone]  PCP: Gerald Mota, [phone]    +++Important patient information for CSC/Cath Lab staff : None+++    [facility] EP Cath Lab Procedure Order   Procedure: Cardioversion for AF  Ordering Provider: Harinder BERRY  Date Ordered and Prepped: 11/27/2024 Libertad Campbell RN  Anticipated Case Duration:  Standard  Scheduling Needs/Timeframe:  Next Available  Scheduling Contact: Please contact pt to schedule  Cardiology Follow Up Apt s/p: EP MD for ablation consult  Current Device/Device Co Needed for Procedure: None NoneNone  Pre-Procedural Testing needed: None  Anesthesia:  General    [facility] EP Cath Lab Prep   H&P:  Compled by cardiology on 11/27/24 if scheduled within 30 days, pt to schedule with PMD if procedure outside of this timeframe  Pre-op Labs: K if pt taking diuretic medication or hx of low Potassium, Beta HcG if appropriate, and INR if on Warfarin will be ordered AM of procedure and Review of most recent labs, WEL for procedure  T&S Pre-Procedure Review: T&S is not required for DCCV/DFT Testing  Medical Records Pertinent for Procedure:  None  Iodinated Contrast Dye Allergies (Does not include Shellfish, Egg, and/or Iodine Allergy): NA  GLP-1 Protocol: If on Dulaglutide (Trulicity) (weekly)- Injection hold 7 days prior to procedure  , Exenatide extended release (Bydureon bcise)  (weekly)- Injection hold 7 days prior to procedure, Exenatide (Byetta) (twice daily)- Oral Tablet hold day prior and morning of procedure and for Injection hold 7 days prior to procedure, Semaglutide (Ozempic) (weekly)- Injection and Oral hold 7 days prior to procedure, Liraglutide (Victoza, Saxenda) (daily)- Injection hold day prior and morning of procedure    No Known Allergies    Current Outpatient Medications:     apixaban ANTICOAGULANT (ELIQUIS) 5 MG tablet, Take 1 tablet (5 mg) by mouth 2 times daily., Disp: 60 tablet, Rfl: 0    atorvastatin (LIPITOR) 40 MG tablet, TAKE ONE TABLET BY MOUTH ONE TIME DAILY, Disp: 90 tablet, Rfl: 2    Chondroitin Sulfate 400 MG CAPS, Take 1,200 mg by mouth daily., Disp: , Rfl:     cyclobenzaprine (FLEXERIL) 5 MG tablet, Take 1-2 tablets (5-10 mg) by mouth 3 times daily as needed for muscle spasms, Disp: 30 tablet, Rfl: 2    Glucos-MSM-C-Lz-Awkjkd-Tlautq (GLUCOSAMINE MSM COMPLEX) TABS tablet, Take 2 tablets by mouth daily., Disp: , Rfl:     losartan (COZAAR) 50 MG tablet, Take 1 tablet (50 mg) by mouth daily., Disp: , Rfl:     metoprolol succinate ER (TOPROL XL) 50 MG 24 hr tablet, Take 1 tablet (50 mg) by mouth daily., Disp: 90 tablet, Rfl: 1    multivitamin therapeutic (THERAGRAN) tablet, [MULTIVITAMIN THERAPEUTIC (THERAGRAN) TABLET] Take 1 tablet by mouth daily., Disp: , Rfl:     omeprazole (PRILOSEC) 20 MG DR capsule, Take 1 capsule (20 mg) by mouth every other day Take first thing in AM on empty stomach, Disp: 90 capsule, Rfl: 1    Documentation Date:11/27/2024 10:40 AM  Libertad Campbell RN

## 2024-11-27 NOTE — PATIENT INSTRUCTIONS
[facility]  Cardiac Electrophysiology  [address]  [place] [address]   Office: [phone]  Fax: [phone]     Ruy Gibbs,    It was a pleasure to see you today at the [facility].     You have atrial fibrillation. We reviewed physiology and management options including antiarrhythmic drug therapy, catheter ablation and lifestyle changes. Below is some additional information about atrial fibrillation.     My recommendations after this visit include:  - continue metoprolol for now for heart rate control  - will plan to schedule cardioversion in the near future, we will have our nurses reach out to you to find a good time  - will plan to start an antiarrythmic medication (sotalol) two days prior to cardioversion   - continue eliquis 5 mg twice daily for stroke prevention  - consider ablation in the future    Please do not hesitate to call with additional questions or concerns.     Harinder Turner PA-C  Clinical Cardiac Electrophysiology  [facility]  Clinic and [phone]  Fax: [phone]  Electrophysiology Nurses: [phone]             ATRIAL FIBRILLATION: Patient Information       What is atrial fibrillation?  Atrial fibrillation (AF or A-fib) is a common heart rhythm problem (arrhythmia) occurring within the upper chambers of the heart (the atria).  In normal rhythm, the upper and lower chambers of the heart are electrically driven to contract in a coordinated sequence.  In atrial fibrillation, the atria lose their ability to contract because of rapid and chaotic electrical activity.  The lower chambers of the heart (the ventricles) continue to pump blood throughout the body, though with irregular and often faster rates due to the chaotic activity within the atria.            How do I know if I have atrial fibrillation?   Some people may feel their heart beating faster, harder, or irregularly while in atrial  fibrillation.  Others may be lightheaded, fatigued, feel weak or tired or become more short of breath especially with activities.  Some patients have no symptoms at all.  Atrial fibrillation may be found due to an irregular pulse or on an electrocardiogram (ECG). Atrial fibrillation can start and stop on its own, and episodes can last from seconds to several months.       How common is atrial fibrillation?   An estimated 3-6 million people in the [place] have atrial fibrillation.  Atrial fibrillation is a common heart rhythm problem for older persons, affecting as estimated 12-15% of people over the age of 65 years of age. Up to 20% of people age 80 or older have atrial fibrillation though this is probably higher in reality.      What causes atrial fibrillation?   Age is the most important risk factor for atrial fibrillation.  Atrial fibrillation is more common in people with other heart disease, high blood pressure, diabetes, obesity, sleep apnea and in people who regularly consume alcohol.  Surgery, lung disease, or thyroid problems can lead to atrial fibrillation.  Atrial fibrillation has multiple possible causes, and in most cases a single cause cannot be found.    Atrial fibrillation is a progressive condition, meaning it worsens over time. Atrial fibrillation usually starts with at an early stage, with short and infrequent episodes.  In later stages of disease, more frequent and longer lasting episodes of atrial fibrillation occur. Over time, episodes ultimately do not stop on their own. Generally, more enlargement and scarring within the upper chambers of the heart is observed as atrial fibrillation progresses from early to late-stage disease.     How is atrial fibrillation diagnosed and evaluated?    Because of its start-stop nature, atrial fibrillation can be challenging to diagnose.  Atrial fibrillation is most commonly diagnosed via cardiac rhythm recordings - either an ECG or wearable cardiac  rhythm monitor.  For patients with pacemakers, defibrillators or implantable loop recorders, atrial fibrillation may be recorded via these devices.  Recently, commercially available devices (eg. Apple Watch, Trac Emc & Safety device, certain Henable devices, others) can allow patients to take cardiac rhythm recordings which may document atrial fibrillation.  Once atrial fibrillation is diagnosed, additional testing is indicated, including blood tests and an echocardiogram.  The echocardiogram uses ultrasound to look at your heart to assess your cardiac function and evaluate for other heart disease.  Additional evaluation may include CT or MRI studies.     Is atrial fibrillation dangerous?   Atrial fibrillation is not usually a life-threatening arrhythmia.  The most serious consequences of atrial fibrillation including stroke and worsening of overall cardiac function.  While in atrial fibrillation, the upper cardiac chambers do not contract normally, resulting in slower blood flow and increased risk of clot formation.  If this blood clot becomes detached from the heart a stroke can occur.  Unfortunately, stroke can be the first sign of atrial fibrillation for some people.  With a stroke, you may notice abnormal sensation, weakness on one side of the body or face, changes in your vision or speech.  If you have any of these signs, you should call 911 and be evaluated in an emergency room as soon as possible.       How is atrial fibrillation treated?     Several treatment options exist for suppressing atrial fibrillation - however, it is not an easily managed abnormal rhythm. The first goal in managing atrial fibrillation is to minimize stroke risk. The second goal is to improve symptoms associated with atrial fibrillation.  Finally, in patients with reduced cardiac function, maintaining normal rhythm can help improve cardiac function.       Blood thinners are used to reduce the risk of stroke in patients with high estimated stroke  risk related to atrial fibrillation. Blood thinners include medications like Eliquis, Xarelto, Pradaxa, and warfarin. For patients at higher risk of bleeding related to blood thinner use, implantable devices including the Watchman device may be an option to reduce stroke risk without the need for long term blood thinner use.       Atrial fibrillation can be managed via two strategies: rate control and rhythm control.  In a rate control strategy, our goal is to control your heart rate, typically with medications (such as beta blockers or calcium channel blockers). You will likely go in and out of rhythm. In a rhythm control strategy, medications (antiarrhythmics) or catheter ablation is used to keep you in normal rhythm and slow disease progression. A procedure called a cardioversion, in which an electric shock is delivered through patches placed on the chest wall while under deep sedation, can be performed to temporarily restore normal cardiac rhythm, though does not address the chance of atrial fibrillation recurrence. Treatments are more effective for earlier rather than later stage atrial fibrillation.  Lifestyle changes (maintaining a healthy weight, aerobic exercise, diagnosing and treating sleep apnea, and minimizing alcohol intake) are important elements of atrial fibrillation rhythm control.      What is catheter ablation for atrial fibrillation?  Cardiac catheter ablation is a commonly performed, minimally invasive procedure performed by a cardiac electrophysiologist to treat many different cardiac rhythm abnormalities. During the procedure, long, thin, flexible tubes are advanced into the heart using the femoral vein. Thermal energy (either heating or cooling) is applied within the heart to stop abnormal electrical activity.  Atrial fibrillation ablation is performed under general anesthesia with a breathing tube. Procedures generally taking approximately 2-3 hours.  Patients are typically observed for 3-5  hours after the ablation, and in most cases can be discharged home the same day.  Atrial fibrillation ablation is associated with better outcomes (including mortality, cardiovascular hospitalizations, and atrial arrhythmia recurrences) compared to antiarrhythmic drug therapy.  However, atrial fibrillation recurrences are not uncommon, and repeat catheter ablation may be offered.  Your electrophysiology team can review atrial fibrillation ablation, anticipated success rates, risks, and recovery expectations with you.     What are anti-arrhythmic medications?  Anti-arrhythmic medications are specialized drugs which alter cardiac electrical functioning to decrease arrhythmias. There are several anti-arrhythmic medications available, each with its own success rate and side effects. Commonly used medications include sotalol, flecainide, and amiodarone. Some anti-arrhythmic medications are less effective though safer to use, others are more effective but have serious potential toxicities.  Atrial fibrillation recurrences are common and may require dose adjustment or change in antiarrhythmic therapy. Over time medications may not work as well as when they were first started. Your electrophysiology team will carefully consider which medication would be the best and safest for your particular case.       Can I live a normal life?    The goal of atrial fibrillation management is for patients to live normal lives without being limited by symptoms related to atrial fibrillation.     Are any additional educational resources available?  There are a number of excellent atrial fibrillation education resources available to you online.  A few options you may wish to review include:  hrsonline.org/guide-atrial-fibrillation  afibmatters.org  getsmartaboutafib.com  stopaf.com     What comes next?    Consider your management options and let us know how we can help in your decision process.  Please take medications as they have been  prescribed.  You should also get any tests that may have been ordered for you.       When to Call Your Doctor or seek emergency care:  Call your doctor or seek emergency care if you have any significant changes with the following:  Weakness  Dizziness  Fainting  Fatigue  Shortness of breath  Chest pain with increased activity  If you are concerned that your heart rate is too fast or too slow  Bleeding that does not stop in 10 minutes  Coughing or throwing up blood  Bloody diarrhea or bleeding hemorrhoids  Dark-colored urine or black stool    Allergic reactions:  Rash  Itching  Swelling  Trouble breathing or swallowing        Please call the [facility] at [phone] if you have concerns about your symptoms, your medicines, or your follow-up appointments.

## 2024-11-27 NOTE — TELEPHONE ENCOUNTER
Pre-Procedure Education    Procedure: DCCV with Pratima Haines NP on 12/5/2024 with arrival time 8:30 am    PT IS ON ELIQUIS AND NO MISSED DOSES AWARE TO CALL IF HE DOES    Orders: Orderset for procedure verified signed/held    COVID: COVID policy- if pt develops COVID like symptoms prior to procedure, he/she would need to complete an at home with a rapid antigen COVID test 1-2 days prior to your procedure date. If COVID + pt is aware the procedure will need to be rescheduled, and to contact CV scheduling as soon as possible    Pre-Op H&P: Completed- Available in Epic    Education:     PT HAS A  FOR PROCEDURE THAT MAY OR MAY NOT STAY    PT HS MY CHART REVIEWED  PT HAS PROCEDURE LETTER REVIEWED    PT INSTRUCTED TO HOLD ANY VITAMINS MINERALS CALCIUM IRON OR SUPPLEMENTS THE MORNING OF CV  PT INSTRUCTED NO GUM CHEWING MINTS OR CANDY THE MORNING OF CV  PT INSTRUCTED TO LEAVE JEWELRY AT HOME  PT INSTRUCTED TO BATHE OR SHOWER BEFORE COMING IN  PT IS ON ELIQUIS  PT INSTRUCTED TO STOP HIS METOPROLOL 2 DAYS PRIOR PROCEDURE AND START HIS SOTALOL 80 MG Q 12 HRS PER VESTA JOHNSON PA-C  PT HAS A POST CV FOLLOW UP WITH GARRICK 1/9    Contact: Reviewed via phone with pt    Pre-Procedure Instruction: NPO after midnight pre procedure, Defined NPO with pt, Remove all jewelry and leave all valuables at home, Shower prior to arrival, Sedation plan/orders, Transportation requirements and arrangements post procedure, Post-procedure follow up process, Post-procedure restrictions/expectations, and Pre-procedure letter sent- letter tab  Risks:      Medication:   Instructions regarding anticoagulants: Eliquis- To continue anticoagulation uninterrupted through their procedure    Instructions regarding antiarrhythmic medication: Sotalol; continue medication prior to procedure as prescribed    Instructions given to pt regarding diuretics medication: NA for DCCV    Instructions given to pt regarding DM/GLP-1 medication:   DM- None  GLP-1-  None    Instructions for medication, other than anticoagulants and antiarrhythmics listed above, given to pt: Take all medication AM of procedure with small sips of water     Important patient information for staff:  NEW SOTALOL  START    11/27/2024 1:35 PM  Natacha Capps LPN

## 2024-11-27 NOTE — Clinical Note
Hi,   Could we set him up for a cardioversion maybe next week? He has been anticoagulated appropriately for greater than  3 weeks with no missed doses. He just started metoprolol today, but planned to start sotalol 2 days prior to cardioversion. He has a busy schedule with his wife's appointments. Let me know if you need anything else.   Thanks, Harinder

## 2024-11-27 NOTE — LETTER
11/27/2024    Gerald Mota PA-C  [address]  [facility] [address]    RE: Ruy Gibbs       Dear Colleague,     I had the pleasure of seeing Ruy Gibbs in the [facility].       [facility]  Cardiac Electrophysiology  [address]  [place] [address]   Office: [phone]  Fax: [phone]     HEART CARE ELECTROPHYSIOLOGY NOTE    Primary care: Gerald Mota MD  Primary cardiologist: Dr. Peralta    REASON FOR VISIT: Paroxysma/persistent atrial fibrillation      Assessment/Recommendations     Paroxysmal/persistent atrial fibrillation/: he recently underwent work up for ablation of a nerve in his back, and they found he is in atrial fibrillation.  He was asymptomatic and unaware.  He had a similar experience about 10 years ago when he had a cholecystectomy, he was found to be in atrial fibrillation.  He was sent to the ER for further evaluation.  He was started on Eliquis 5 mg twice daily and followed up with Dr. Peralta, where he was in sinus rhythm.  Following the echocardiogram, he was in recurrent atrial fibrillation with mildly elevated ventricular response, and was started on metoprolol 50 mg daily    [id number] score of 4 for age >75 and hypertension, diabetes (non-insulin dependent)    He continues to be asymptomatic of his atrial fibrillation. He was unaware that he is in it today.     We discussed the pathophysiology and natural progression of atrial fibrillation, management including stroke risk reduction, rate control, antiarrhythmic drug therapy, and consideration of catheter ablation, with maintenance of sinus rhythm associated with reduced mortality, stroke, and heart failure- and acute coronary syndrome-related hospitalizations. Discussed catheter ablation procedure for atrial fibrillation as a long-term management strategy and superiority to medical therapy . He was given information to review at home. We  also discussed long term management of atrial fibrillation includes sleep apnea diagnosis and management, avoiding heavy alcohol consumption, and management of concurrent hypertension and coronary artery disease as appropriate.    Hypertension: controlled, continue current regimen    Plan:  Continue metoprolol for now for rate control  Continue eliquis 5 mg BID for stroke prophylaxis  Will set up for cardioversion - will start sotalol 80 mg BID 2 days prior to DCCV.   EP MD follow up for consideration of ablation.        History of Present Illness/Subjective    Ruy Gibbs is a 79 year old male who is seen today for evaluation of atrial fibrillation. He has a past medical history significant for paroxysmal atrial fibrillation and hypertension.  He has had a history of atrial fibrillation following an outpatient surgery for a nerve in his back, and roughly 10 years ago while having a cholecystectomy.    He is completely asymptomatic with atrial fibrillation.  He was unaware that he was in atrial flutter this morning.  He denies chest discomfort, palpitations, shortness of breath, lightheadedness/dizziness, pedal edema, or syncope.       Data Review     Arrhythmia hx:  Sx: asymptomatic  Dx/date: 2014  Rate control: Metoprolol 50 mg daily  AAD: None  DCCV: None  Ablation: None  [id number] at least 3,  OAC: Eliquis twice daily    EKG personally reviewed.   11/7/2024: Sinus rhythm 72 bpm, QT/QTc 406/447 ms   11/5/2024: Atrial fibrillation 94 bpm  2/12/2022: Sinus bradycardia 56 bpm    TTE 11/22/2024:  1. The left ventricle is normal in size. Left ventricular systolic performance  is at the lower limits of normal. The ejection fraction is estimated to be  50%.  2. No significant valvular heart disease is identified on this study.  3. Normal right ventricular size with low normal right ventricular systolic  performance.  4. There is mild left atrial enlargement.     Additional comments: Patient appears to be in  atrial fibrillation with mildly  elevated ventricular response    I have reviewed and updated the patient's past medical history, allergy list and medication list.          Physical Examination Review of Systems   BMI= Body mass index is 26.25 kg/m .    Wt Readings from Last 3 Encounters:   11/27/24 95.3 kg (210 lb)   11/07/24 96.9 kg (213 lb 9.6 oz)   07/12/24 98 kg (216 lb)       Vitals: /80 (BP Location: Left arm, Patient Position: Sitting, Cuff Size: Adult Large)   Pulse 69   Resp 16   Wt 95.3 kg (210 lb)   SpO2 98%   BMI 26.25 kg/m    General   Appearance:   Alert and oriented, in no acute distress.    HEENT:  Normocephalic and atraumatic. Conjunctiva and sclera are clear. Moist oral mucosa.    Neck: No JVP, carotid bruit or obvious thyromegaly.   Lungs:   Respirations unlabored. Clear bilaterally with no rales, rhonchi, or wheezes.     Cardiovascular:   Rhythm is regular. S1 and S2 are normal. No significant murmur is present. Lower extremities demonstrate no significant edema.   Extremities: No cyanosis or clubbing   Skin: Skin is warm, dry, and otherwise intact.   Neurologic: Gait not assessed. Mood and affect appropriate.     ROS: 10 point ROS neg other than the symptoms noted above in the HPI.      Medical History  Surgical History Family History Social History   Past Medical History:   Diagnosis Date     Benign essential hypertension 12/12/2018     Diabetes mellitus type 2, noninsulin dependent (H) 2/7/2018     Hyperlipemia      Reflux esophagitis     Past Surgical History:   Procedure Laterality Date     CHOLECYSTECTOMY  Jan 2014    [facility]. [facility]     HC REMOVAL OF TONSILS,<13 Y/O      Description: Tonsillectomy;  Recorded: 08/04/2008;     INGUINAL HERNIA REPAIR Left 4/13/2016    Procedure: LEFT INGUINAL HERNIA REPAIR;  Surgeon: Drew Way MD;  Location: [facility];  Service:      OTHER SURGICAL HISTORY [facility] also right inferior oblique myomectomy at same time     TONSILLECTOMY      Family History   Problem Relation Age of Onset     Prostate Cancer Father 71.00        early 70s    Social History     Tobacco Use     Smoking status: Former     Current packs/day: 0.00     Types: Cigarettes     Quit date: 1969     Years since quittin.9     Passive exposure: Never     Smokeless tobacco: Never   Vaping Use     Vaping status: Never Used   Substance Use Topics     Alcohol use: Yes     Alcohol/week: 1.0 standard drink of alcohol     Comment: Alcoholic Drinks/day: occassional     Drug use: No          Medications  Allergies   Current Outpatient Medications   Medication Sig Dispense Refill     apixaban ANTICOAGULANT (ELIQUIS) 5 MG tablet Take 1 tablet (5 mg) by mouth 2 times daily. 60 tablet 0     atorvastatin (LIPITOR) 40 MG tablet TAKE ONE TABLET BY MOUTH ONE TIME DAILY 90 tablet 2     Chondroitin Sulfate 400 MG CAPS Take 1,200 mg by mouth daily.       cyclobenzaprine (FLEXERIL) 5 MG tablet Take 1-2 tablets (5-10 mg) by mouth 3 times daily as needed for muscle spasms 30 tablet 2     Glucos-MSM-C-Tm-Hzgdzo-Sjnrsb (GLUCOSAMINE MSM COMPLEX) TABS tablet Take 2 tablets by mouth daily.       losartan (COZAAR) 50 MG tablet Take 1 tablet (50 mg) by mouth daily.       metoprolol succinate ER (TOPROL XL) 50 MG 24 hr tablet Take 1 tablet (50 mg) by mouth daily. 90 tablet 1     multivitamin therapeutic (THERAGRAN) tablet [MULTIVITAMIN THERAPEUTIC (THERAGRAN) TABLET] Take 1 tablet by mouth daily.       omeprazole (PRILOSEC) 20 MG DR capsule Take 1 capsule (20 mg) by mouth every other day Take first thing in AM on empty stomach 90 capsule 1    No Known Allergies      Lab Results    Chemistry/lipid CBC Cardiac Enzymes/BNP/TSH/INR   Lab Results   Component Value Date    BUN 20.0 2024     2024    CO2 26 2024     No results found for: \"CREATININE\"    Lab Results   Component Value Date    CHOL 130 01/10/2024 "    HDL 42 01/10/2024    LDL 67 01/10/2024      Lab Results   Component Value Date    WBC 7.3 2024    HGB 14.8 2024    HCT 45.9 2024    MCV 93 2024     2024    Lab Results   Component Value Date    TSH 0.99 2024          The longitudinal plan of care for the diagnosis(es)/condition(s) as documented were addressed during this visit. Due to the added complexity in care, I will continue to support Ruy in the subsequent management and with ongoing continuity of care.      This note has been dictated using voice recognition software. Any grammatical, typographical, or context distortions are unintentional and inherent to the software.    Harinder Turner PA-C  Clinical Cardiac Electrophysiology  [facility]  Clinic and [phone]  Fax: [phone]  Electrophysiology Nurses: [phone]                                    Thank you for allowing me to participate in the care of your patient.      Sincerely,     Caesar Turner PA-C     [facility]  cc:   Lynsey Peralta MD  [address]  [place] [address]

## 2024-11-28 LAB
ATRIAL RATE - MUSE: NORMAL BPM
DIASTOLIC BLOOD PRESSURE - MUSE: NORMAL MMHG
INTERPRETATION ECG - MUSE: NORMAL
P AXIS - MUSE: NORMAL DEGREES
PR INTERVAL - MUSE: NORMAL MS
QRS DURATION - MUSE: 94 MS
QT - MUSE: 378 MS
QTC - MUSE: 464 MS
R AXIS - MUSE: 14 DEGREES
SYSTOLIC BLOOD PRESSURE - MUSE: NORMAL MMHG
T AXIS - MUSE: 72 DEGREES
VENTRICULAR RATE- MUSE: 91 BPM

## 2024-12-05 ENCOUNTER — ANESTHESIA (OUTPATIENT)
Dept: CARDIOLOGY | Facility: HOSPITAL | Age: 79
End: 2024-12-05
Payer: COMMERCIAL

## 2024-12-05 ENCOUNTER — HOSPITAL ENCOUNTER (OUTPATIENT)
Dept: CARDIOLOGY | Facility: HOSPITAL | Age: 79
Discharge: HOME | End: 2024-12-05
Admitting: INTERNAL MEDICINE
Payer: COMMERCIAL

## 2024-12-05 ENCOUNTER — ANCILLARY PROCEDURE (OUTPATIENT)
Dept: GENERAL RADIOLOGY | Facility: CLINIC | Age: 79
End: 2024-12-05
Attending: FAMILY MEDICINE
Payer: COMMERCIAL

## 2024-12-05 ENCOUNTER — OFFICE VISIT (OUTPATIENT)
Dept: URGENT CARE | Facility: URGENT CARE | Age: 79
End: 2024-12-05
Payer: COMMERCIAL

## 2024-12-05 ENCOUNTER — ANESTHESIA EVENT (OUTPATIENT)
Dept: CARDIOLOGY | Facility: HOSPITAL | Age: 79
End: 2024-12-05
Payer: COMMERCIAL

## 2024-12-05 ENCOUNTER — NURSE TRIAGE (OUTPATIENT)
Dept: FAMILY MEDICINE | Facility: CLINIC | Age: 79
End: 2024-12-05

## 2024-12-05 VITALS
SYSTOLIC BLOOD PRESSURE: 141 MMHG | BODY MASS INDEX: 26.47 KG/M2 | HEART RATE: 62 BPM | WEIGHT: 211.8 LBS | RESPIRATION RATE: 16 BRPM | TEMPERATURE: 97.8 F | DIASTOLIC BLOOD PRESSURE: 89 MMHG | OXYGEN SATURATION: 96 %

## 2024-12-05 VITALS
OXYGEN SATURATION: 98 % | SYSTOLIC BLOOD PRESSURE: 124 MMHG | HEART RATE: 111 BPM | DIASTOLIC BLOOD PRESSURE: 65 MMHG | RESPIRATION RATE: 26 BRPM

## 2024-12-05 DIAGNOSIS — I48.19 PERSISTENT ATRIAL FIBRILLATION (H): Primary | ICD-10-CM

## 2024-12-05 DIAGNOSIS — M25.511 ACUTE PAIN OF RIGHT SHOULDER: ICD-10-CM

## 2024-12-05 DIAGNOSIS — M25.511 ACUTE PAIN OF RIGHT SHOULDER: Primary | ICD-10-CM

## 2024-12-05 DIAGNOSIS — I10 BENIGN ESSENTIAL HYPERTENSION: ICD-10-CM

## 2024-12-05 DIAGNOSIS — I48.0 PAROXYSMAL ATRIAL FIBRILLATION (H): ICD-10-CM

## 2024-12-05 DIAGNOSIS — M75.41 IMPINGEMENT SYNDROME OF SHOULDER REGION, RIGHT: ICD-10-CM

## 2024-12-05 LAB — ALT SERPL W P-5'-P-CCNC: 22 U/L (ref 0–70)

## 2024-12-05 PROCEDURE — 36415 COLL VENOUS BLD VENIPUNCTURE: CPT

## 2024-12-05 PROCEDURE — 370N000017 HC ANESTHESIA TECHNICAL FEE, PER MIN

## 2024-12-05 PROCEDURE — 250N000011 HC RX IP 250 OP 636: Performed by: NURSE ANESTHETIST, CERTIFIED REGISTERED

## 2024-12-05 PROCEDURE — 92960 CARDIOVERSION ELECTRIC EXT: CPT

## 2024-12-05 PROCEDURE — 258N000003 HC RX IP 258 OP 636: Performed by: NURSE ANESTHETIST, CERTIFIED REGISTERED

## 2024-12-05 PROCEDURE — 84460 ALANINE AMINO (ALT) (SGPT): CPT

## 2024-12-05 PROCEDURE — 99213 OFFICE O/P EST LOW 20 MIN: CPT | Performed by: FAMILY MEDICINE

## 2024-12-05 RX ORDER — LOSARTAN POTASSIUM 50 MG/1
50 TABLET ORAL DAILY
Qty: 90 TABLET | Refills: 0 | Status: SHIPPED | OUTPATIENT
Start: 2024-12-05 | End: 2024-12-05

## 2024-12-05 RX ORDER — LOSARTAN POTASSIUM 50 MG/1
50 TABLET ORAL DAILY
Qty: 90 TABLET | Refills: 0 | Status: SHIPPED | OUTPATIENT
Start: 2024-12-05

## 2024-12-05 RX ORDER — AMIODARONE HYDROCHLORIDE 200 MG/1
TABLET ORAL
Qty: 118 TABLET | Refills: 0 | Status: SHIPPED | OUTPATIENT
Start: 2024-12-07 | End: 2025-03-21

## 2024-12-05 RX ORDER — PROPOFOL 10 MG/ML
INJECTION, EMULSION INTRAVENOUS PRN
Status: DISCONTINUED | OUTPATIENT
Start: 2024-12-05 | End: 2024-12-05

## 2024-12-05 RX ORDER — METOPROLOL SUCCINATE 50 MG/1
50 TABLET, EXTENDED RELEASE ORAL DAILY
Qty: 30 TABLET | Refills: 0 | Status: SHIPPED | OUTPATIENT
Start: 2024-12-05

## 2024-12-05 RX ADMIN — PROPOFOL 100 MG: 10 INJECTION, EMULSION INTRAVENOUS at 09:52

## 2024-12-05 RX ADMIN — PHENYLEPHRINE HYDROCHLORIDE 100 MCG: 10 INJECTION INTRAVENOUS at 10:03

## 2024-12-05 ASSESSMENT — ACTIVITIES OF DAILY LIVING (ADL)
ADLS_ACUITY_SCORE: 41

## 2024-12-05 ASSESSMENT — ENCOUNTER SYMPTOMS: DYSRHYTHMIAS: 1

## 2024-12-05 NOTE — PROCEDURES
[facility]    Procedure: Cardioversion    Date/Time: 12/5/2024 11:22 AM    Performed by: Caesar Turner PA-C  Authorized by: Caesar Turner PA-C      UNIVERSAL PROTOCOL   Site Marked: NA  Prior Images Obtained and Reviewed:  Yes  Required items: Required blood products, implants, devices and special equipment available    Patient identity confirmed:  Verbally with patient, arm band and provided demographic data  Patient was reevaluated immediately before administering moderate or deep sedation or anesthesia  Confirmation Checklist:  Patient's identity using two indicators, relevant allergies, procedure was appropriate and matched the consent or emergent situation and correct equipment/implants were available  Time out: Immediately prior to the procedure a time out was called    Universal Protocol: the Joint Commission Universal Protocol was followed       ANESTHESIA    Anesthesia was administered and monitored by anesthesiology.  See anesthesia documentation for details.    SEDATION  Patient Sedated: Yes    Vital signs: Vital signs monitored during sedation      PROCEDURE DETAILS  Cardioversion basis: elective  Pre-procedure rhythm: atrial fibrillation  Patient position: patient was placed in a supine position  Chest area: chest area exposed  Electrodes: pads  Electrodes placed: anterior-posterior  Number of attempts: 3    Details of Attempts:  At 0956, after administration of IV propofol by MDA and confirmation of adequate sedation, he received 3 synchronous shocks at 200, 300, and 360J respectively, with failure to restore sinus rhythm. Initially after the second shock, he briefly converted to sinus rhythm in the 30's, before converted back into atrial fibrillation. Final attempt at 360 J, no changes in rhythm.   Post-procedure rhythm: normal sinus rhythm  Complications: no complications      PROCEDURE  Describe Procedure: Asymptomatic persistent atrial fibrillation which is an  incidental finding for workup for a nerve ablation in his back.  He was started on Eliquis 5 mg twice daily and Toprol 50 mg for rate control.  Sotalol 80 mg twice daily was started 2 days prior to the cardioversion.  Stop sotalol and will start amiodarone on 12/7 after sotalol washout.  Will load with 200 mg twice daily for 2 weeks, then 200 mg daily.  Has follow-up with Dr. Marin 1/9/2024 for ablation consideration.  Continue Eliquis 5 mg twice daily for stroke prophylaxis.  Discharge when awake and stable    DCCV done under direct supervision of Pratima Haines CNP    Patient Tolerance:  Patient tolerated the procedure well with no immediate complications

## 2024-12-05 NOTE — ANESTHESIA POSTPROCEDURE EVALUATION
Patient: Ruy Gibbs    Procedure: * No procedures listed *  Cardioversion External    Anesthesia Type:  MAC    Note:  Disposition: Outpatient   Postop Pain Control: Uneventful            Sign Out: Well controlled pain   PONV: No   Neuro/Psych: Uneventful            Sign Out: Acceptable/Baseline neuro status   Airway/Respiratory: Uneventful            Sign Out: Acceptable/Baseline resp. status   CV/Hemodynamics: Uneventful            Sign Out: Acceptable CV status; No obvious hypovolemia; No obvious fluid overload   Other NRE: NONE   DID A NON-ROUTINE EVENT OCCUR? No           Last vitals:  Vitals:    12/05/24 1014 12/05/24 1015 12/05/24 1020   BP: 106/59 90/60 94/50   Pulse: 105 105 92   Resp: 12 (!) 7 13   SpO2: 99% 99% 96%       Electronically Signed By: Nikolas Garcia MD  December 5, 2024  11:13 AM

## 2024-12-05 NOTE — PROGRESS NOTES
Pt here for cardioversion. In A fib. Pt denies any symptoms. Friend is , wife at home for care. No missed doses of Eliquis. Awaiting procedure.

## 2024-12-05 NOTE — TELEPHONE ENCOUNTER
Nurse Triage SBAR    Is this a 2nd Level Triage? NO    Situation: Patient calling in reporting pain in right shoulder that makes it difficult to move arm.     Background: Patient had cardioversion today at [facility]. States arm was fine prior to procedure, but had pain and difficulty moving arm immediately after procedure. States notified doctor at [facility].     Assessment: Patient reports pain in right shoulder.   Rates pain 10/10 when moving it, but no pain at rest.     Denies neck pain, swelling, rash, fever, numbness, weakness, chest pain, difficulty breathing, lightheadedness.     Protocol Recommended Disposition:   Go To Office Now    Recommendation: Patient advised to go to urgent care now. Patient states he cannot go tonight, but will go first thing in the morning. Advised it was best to get evaluated as soon as possible. Patient verbalized understanding.     Advised to go in to UC or ED immediately with any new or worsening symptoms. Patient verbalized understanding and is in agreement with plan.      Routing to provider to review and advise if any other recommendations.     Reason for Disposition   SEVERE pain (e.g., excruciating, unable to do any normal activities)    Additional Information   Negative: Shock suspected (e.g., cold/pale/clammy skin, too weak to stand, low BP, rapid pulse)   Negative: Similar pain previously and it was from 'heart attack'   Negative: Similar pain previously from 'angina' and not relieved by nitroglycerin   Negative: Sounds like a life-threatening emergency to the triager   Negative: Followed an injury to arm   Negative: Chest pain   Negative: Wound looks infected (e.g., spreading redness, pus)   Negative: Elbow pain is main symptom   Negative: Wrist pain is main symptom   Negative: Difficulty breathing or unusual sweating (e.g., sweating without exertion)   Negative: Chest pain lasting longer than 5 minutes   Negative: Age > 40 and no obvious cause for pain, pain  "still present even when not moving the arm   Negative: Fever and red area (or area very tender to touch)   Negative: Swollen joint and fever   Negative: Entire arm is swollen   Negative: Patient sounds very sick or weak to the triager    Answer Assessment - Initial Assessment Questions  1. ONSET: \"When did the pain start?\"      Today after cardioversion  2. LOCATION: \"Where is the pain located?\"      Right shoulder  3. PAIN: \"How bad is the pain?\" (Scale 0-10; or none, mild, moderate, severe)      10/10 when try to move it, no pain at rest.   4. WORK OR EXERCISE: \"Has there been any recent work or exercise that involved this part of the body?\"      No  5. CAUSE: \"What do you think is causing the arm pain?\"      Cardioversion, but can't think of anything else   6. OTHER SYMPTOMS: \"Do you have any other symptoms?\" (e.g., neck pain, swelling, rash, fever, numbness, weakness)      No neck pain, swelling, rash, fever, numbness, weakness.   7. PREGNANCY: \"Is there any chance you are pregnant?\" \"When was your last menstrual period?\"      No    Protocols used: Arm Pain-A-OH    Joelle Inman RN  [facility]  "

## 2024-12-05 NOTE — INTERVAL H&P NOTE
"I have reviewed the surgical (or preoperative) H&P that is linked to this encounter, and examined the patient. There are no significant changes    Clinical Conditions Present on Arrival:  Clinically Significant Risk Factors Present on Admission                 # Drug Induced Coagulation Defect: home medication list includes an anticoagulant medication       # DMII: A1C = N/A within past 6 months  # Overweight: Estimated body mass index is 26.25 kg/m  as calculated from the following:    Height as of 11/7/24: 1.905 m (6' 3\").    Weight as of 11/27/24: 95.3 kg (210 lb).       "

## 2024-12-05 NOTE — ANESTHESIA PREPROCEDURE EVALUATION
Anesthesia Pre-Procedure Evaluation    Patient: Ruy Gibbs   MRN: [id number] : 1945        Procedure : * No procedures listed *  Cardioversion External       Past Medical History:   Diagnosis Date    Benign essential hypertension 2018    Diabetes mellitus type 2, noninsulin dependent (H) 2018    Hyperlipemia     Reflux esophagitis       Past Surgical History:   Procedure Laterality Date    CHOLECYSTECTOMY  2014    dr. Way at [facility]    HC REMOVAL OF TONSILS,<13 Y/O      Description: Tonsillectomy;  Recorded: 2008;    INGUINAL HERNIA REPAIR Left 2016    Procedure: LEFT INGUINAL HERNIA REPAIR;  Surgeon: Drew Way MD;  Location: [facility];  Service:     OTHER SURGICAL HISTORY [facility]    TONSILLECTOMY        No Known Allergies   Social History     Tobacco Use    Smoking status: Former     Current packs/day: 0.00     Types: Cigarettes     Quit date: 1969     Years since quittin.9     Passive exposure: Never    Smokeless tobacco: Never   Substance Use Topics    Alcohol use: Yes     Alcohol/week: 1.0 standard drink of alcohol     Comment: Alcoholic Drinks/day: occassional      Wt Readings from Last 1 Encounters:   24 95.3 kg (210 lb)        Anesthesia Evaluation            ROS/MED HX  ENT/Pulmonary:  - neg pulmonary ROS     Neurologic:  - neg neurologic ROS     Cardiovascular:     (+) Dyslipidemia hypertension- -   -  - -                        dysrhythmias, a-fib,             METS/Exercise Tolerance:     Hematologic:       Musculoskeletal:       GI/Hepatic:     (+) GERD,                   Renal/Genitourinary:       Endo:     (+)  type II DM,                    Psychiatric/Substance Use:  - neg psychiatric ROS     Infectious Disease:       Malignancy:       Other:            Physical Exam    Airway        Mallampati: III   TM distance: <  "3 FB   Neck ROM: full     Respiratory Devices and Support         Dental       (+) Modest Abnormalities - crowns, retainers, 1 or 2 missing teeth      Cardiovascular          Rhythm and rate: irregular     Pulmonary   pulmonary exam normal                OUTSIDE LABS:  CBC:   Lab Results   Component Value Date    WBC 7.3 11/05/2024    WBC 6.3 01/10/2024    HGB 14.8 11/05/2024    HGB 15.3 01/10/2024    HCT 45.9 11/05/2024    HCT 46.2 01/10/2024     11/05/2024     01/10/2024     BMP:   Lab Results   Component Value Date     11/05/2024     07/12/2024    POTASSIUM 3.7 11/05/2024    POTASSIUM 4.4 07/12/2024    CHLORIDE 105 11/05/2024    CHLORIDE 107 07/12/2024    CO2 26 11/05/2024    CO2 26 07/12/2024    BUN 20.0 11/05/2024    BUN 21.1 07/12/2024    CR 0.84 11/05/2024    CR 0.82 07/12/2024     (H) 11/05/2024     (H) 07/12/2024     COAGS: No results found for: \"PTT\", \"INR\", \"FIBR\"  POC: No results found for: \"BGM\", \"HCG\", \"HCGS\"  HEPATIC:   Lab Results   Component Value Date    ALBUMIN 4.5 01/10/2024    PROTTOTAL 6.9 01/10/2024    ALT 21 01/10/2024    AST 22 01/10/2024    ALKPHOS 67 01/10/2024    BILITOTAL 0.9 01/10/2024     OTHER:   Lab Results   Component Value Date    A1C 6.6 (H) 07/12/2024    MITALI 9.2 11/05/2024    MAG 2.1 11/05/2024    TSH 0.99 11/05/2024       Anesthesia Plan    ASA Status:  3       Anesthesia Type: MAC.              Consents          - Extended Intubation/Ventilatory Support Discussed: No.      - Patient is DNR/DNI Status: No     Use of blood products discussed: No .     Postoperative Care            Comments:               Nikolas Garcia MD    I have reviewed the pertinent notes and labs in the chart from the past 30 days and (re)examined the patient.  Any updates or changes from those notes are reflected in this note.            # Drug Induced Coagulation Defect: home medication list includes an anticoagulant medication    # Hypertension: Noted on problem " "list          # DMII: A1C = N/A within past 6 months    # Overweight: Estimated body mass index is 26.25 kg/m  as calculated from the following:    Height as of 11/7/24: 1.905 m (6' 3\").    Weight as of 11/27/24: 95.3 kg (210 lb).             "

## 2024-12-05 NOTE — PROGRESS NOTES
Pt ready for discharge post cardioversion. At baseline neuro status. C/O some right shoulder pain, has improved with movement getting out of bed, but pt will contact primary provider if not improved by tomorrow. Remains in Afib. Asymptomatic. Labs sent. Pt understanding of AVS instructions, changes in meds. Discharge to neighbor home to care of wife.

## 2024-12-05 NOTE — ANESTHESIA CARE TRANSFER NOTE
Patient: Ruy Gibbs    Procedure: * No procedures listed *  Cardioversion External    Diagnosis: * No pre-op diagnosis entered *  Diagnosis Additional Information: No value filed.    Anesthesia Type:   MAC     Note:    Oropharynx: spontaneously breathing and oropharynx clear of all foreign objects  Level of Consciousness: drowsy  Oxygen Supplementation: nasal cannula  Level of Supplemental Oxygen (L/min / FiO2): 4  Independent Airway: airway patency satisfactory and stable  Dentition: dentition unchanged  Vital Signs Stable: post-procedure vital signs reviewed and stable    Patient transferred to: [facility]      Vitals:  Vitals Value Taken Time   /60 12/05/24 1004   Temp     Pulse 98 12/05/24 1005   Resp 13 12/05/24 1005   SpO2 99 % 12/05/24 1005   Vitals shown include unfiled device data.    Electronically Signed By: DEVON Mendoza CRNA  December 5, 2024  10:07 AM

## 2024-12-05 NOTE — TELEPHONE ENCOUNTER
Spoke to patient and relayed provider message. Patient verbalized understanding and states he will go to urgent care now.     Joelle Inman RN  [facility]

## 2024-12-05 NOTE — DISCHARGE INSTRUCTIONS
Cardioversion  Cardioversion is a procedure to restore your heart's normal rhythm from a fast or irregular rhythm (arrhythmia) in the top or bottom chambers of your heart. You may have the procedure in a hospital or surgery center. It's often done on an outpatient (same day) basis. During the procedure, your doctor will give you medication to keep you free from pain. Then the doctor gives you a brief electric shock. This helps your heartbeat become normal again. In most cases, you can go home within hours of the procedure.        After Your Procedure  Your health care provider will monitor you until you are fully awake. Then you ll be able to sit up, walk, and eat.  In most cases, you ll be able to go home after the sedation wears off. This usually takes a few hours.  For a few days, the skin on your chest may feel a little sore, like a mild sunburn.  DO NOT  drive or operate heavy machinery for 24 hours after the procedure.  The day after your procedure, try to take it easy. Take medication as directed.  Call your doctor if you notice skipped beats, a rapid heartbeat, or chest tightness. These may be signs that an irregular heartbeat has returned

## 2024-12-06 NOTE — PROGRESS NOTES
Assessment/Plan:   1. Acute pain of right shoulder (Primary)  - XR Shoulder Right G/E 3 Views; Future  2. Impingement syndrome of shoulder region, right    Suspected rotator cuff tendonitis with impingement     Radiologist reading is pending - I will call if it changes our plan.     Ice the shoulder every few hours while awake for a couple days. Then as needed    Tylenol if needed for pain    Sling if needed for comfort to rest the shoulder.     If not improving, then follow up with [facility] or your primary care for further evaluation and possible physical therapy.     I discussed red flag symptoms, return precautions to clinic/ER and follow up care with patient/parent.  Expected clinical course, symptomatic cares advised. Questions answered. Patient/parent amenable with plan.        Subjective:     Ruy Gibbs is a 79 year old male who presents ***    No Known Allergies  Current Outpatient Medications   Medication Sig Dispense Refill    [START ON 12/7/2024] amiodarone (PACERONE) 200 MG tablet Take 1 tablet (200 mg) by mouth 2 times daily for 14 days, THEN 1 tablet (200 mg) daily. 118 tablet 0    apixaban ANTICOAGULANT (ELIQUIS) 5 MG tablet Take 1 tablet (5 mg) by mouth 2 times daily. 180 tablet 3    atorvastatin (LIPITOR) 40 MG tablet TAKE ONE TABLET BY MOUTH ONE TIME DAILY 90 tablet 2    Chondroitin Sulfate 400 MG CAPS Take 1,200 mg by mouth daily.      cyclobenzaprine (FLEXERIL) 5 MG tablet Take 1-2 tablets (5-10 mg) by mouth 3 times daily as needed for muscle spasms 30 tablet 2    Glucos-MSM-C-Gt-Dovzkq-Kshwrz (GLUCOSAMINE MSM COMPLEX) TABS tablet Take 2 tablets by mouth daily.      losartan (COZAAR) 50 MG tablet Take 1 tablet (50 mg) by mouth daily. 90 tablet 0    metoprolol succinate ER (TOPROL XL) 50 MG 24 hr tablet Take 1 tablet (50 mg) by mouth daily. Continue metoprolol. Can overlap with the amiodarone by 1 week, then stop 11/14/24. 30 tablet 0    multivitamin therapeutic (THERAGRAN) tablet  [MULTIVITAMIN THERAPEUTIC (THERAGRAN) TABLET] Take 1 tablet by mouth daily.      omeprazole (PRILOSEC) 20 MG DR capsule Take 1 capsule (20 mg) by mouth every other day Take first thing in AM on empty stomach 90 capsule 1     No current facility-administered medications for this visit.     Patient Active Problem List   Diagnosis    Male Erectile Disorder    Hyperlipidemia    Chronic Reflux Esophagitis    Diabetes mellitus type 2, noninsulin dependent (H)    Benign essential hypertension       Objective:     BP (!) 141/89 (BP Location: Right arm, Patient Position: Sitting, Cuff Size: Adult Regular)   Pulse 62   Temp 97.8  F (36.6  C) (Oral)   Resp 16   Wt 96.1 kg (211 lb 12.8 oz)   SpO2 96%   BMI 26.47 kg/m      Physical            This note has been dictated in part using voice recognition software.  Any grammatical or context distortions are unintentional and inherent to the software.  Please feel free to contact me directly for clarification if needed.     aVSS with slightly elevated blood pressure.   Head: Normocephalic, without obvious abnormality, atraumatic  Eyes: Conjunctivae are normal.   Lungs: respirations unlabored  Extremities: Normal right arm wrist pulses and cap refill. Normal hand . Normal sensation of fingers, hand and arm.   No bruising or swelling. No visible biceps or shoulder deformity.   He has limited active abduction anterior or lateral at the shoulder. There is fair abduction with passive range of motion though hard for him to let the shoulder be completely passive. He has some active internal and external rotation motion without much pain. With arm in adduction he can rotate the forearm and flex the elbow without much pain. No pain with supination and pronation against resistance. No deltoid bursa pain with palpation. Minimal AC joint tenderness. There is superior shoulder pain with palpation.   Skin: no rashes or lesions  Psychiatric: Patient has a normal mood and affect.         Results for orders placed or performed in visit on 12/05/24   XR Shoulder Right G/E 3 Views     Status: None    Narrative    EXAM: XR SHOULDER RIGHT G/E 3 VIEWS  LOCATION: [facility]  DATE: 12/5/2024    INDICATION: Right shoulder pain.  COMPARISON: None available.      Impression    IMPRESSION: Anatomic alignment right shoulder. No acute displaced right shoulder fracture. Moderate acromioclavicular and glenohumeral joint osteoarthritis with inferomedial humeral head spurring. Diffuse bone demineralization. Visualized right lung is   grossly clear. Partially seen is degenerative change in the visualized spine.         Results for orders placed or performed during the hospital encounter of 12/05/24   ALT     Status: Normal   Result Value Ref Range    ALT 22 0 - 70 U/L   Cardioversion     Status: None    Narrative    Caesar Turner PA-C     12/5/2024 11:27 AM  [facility]    Procedure: Cardioversion    Date/Time:  12/5/2024 11:22 AM    Performed by: Caesar Turner PA-C  Authorized by: Caesar Turner PA-C      UNIVERSAL PROTOCOL   Site Marked: NA  Prior Images Obtained and Reviewed:  Yes  Required items: Required blood products, implants, devices and special   equipment available    Patient identity confirmed:  Verbally with patient, arm band and provided   demographic data  Patient was reevaluated immediately before administering moderate or deep   sedation or anesthesia  Confirmation Checklist:  Patient's identity using two indicators, relevant   allergies, procedure was appropriate and matched the consent or emergent   situation and correct equipment/implants were available  Time out: Immediately prior to the procedure a time out was called    Universal Protocol: the Joint Commission Universal Protocol was followed       ANESTHESIA    Anesthesia was administered and monitored by anesthesiology.  See   anesthesia documentation for details.    SEDATION  Patient Sedated: Yes    Vital signs: Vital signs monitored during sedation      PROCEDURE DETAILS  Cardioversion basis: elective  Pre-procedure rhythm: atrial fibrillation  Patient position: patient was placed in a supine position  Chest area: chest area exposed  Electrodes: pads  Electrodes placed: anterior-posterior  Number of attempts: 3    Details of Attempts:  At 0956, after administration of IV propofol by MDA   and confirmation of adequate sedation, he received 3 synchronous shocks at   200, 300, and 360J respectively, with failure to restore sinus rhythm.   Initially after the second shock, he briefly converted to sinus rhythm in   the 30's, before converted back into atrial fibrillation. Final attempt at   360 J, no changes in rhythm.   Post-procedure rhythm: normal sinus rhythm  Complications: no complications      PROCEDURE  Describe Procedure: Asymptomatic persistent atrial fibrillation which is   an incidental finding for workup for a nerve ablation in his  back.  He was   started on Eliquis 5 mg twice daily and Toprol 50 mg for rate control.    Sotalol 80 mg twice daily was started 2 days prior to the cardioversion.    Stop sotalol and will start amiodarone on 12/7 after sotalol washout.    Will load with 200 mg twice daily for 2 weeks, then 200 mg daily.  Has   follow-up with Dr. Marin 1/9/2024 for ablation consideration.  Continue   Eliquis 5 mg twice daily for stroke prophylaxis.  Discharge when awake and   stable    DCCV done under direct supervision of Pratima Haines CNP    Patient Tolerance:  Patient tolerated the procedure well with no immediate   complications        This note has been dictated in part using voice recognition software.  Any grammatical or context distortions are unintentional and inherent to the software.  Please feel free to contact me directly for clarification if needed.

## 2024-12-06 NOTE — PATIENT INSTRUCTIONS
Radiologist reading is pending - I will call if it changes our plan.     Ice the shoulder every few hours while awake for a couple days. Then as needed    Tylenol if needed for pain    Sling if needed for comfort to rest the shoulder.     If not improving, then follow up with [facility] or your primary care for further evaluation and possible physical therapy.

## 2025-01-09 ENCOUNTER — OFFICE VISIT (OUTPATIENT)
Dept: CARDIOLOGY | Facility: CLINIC | Age: 80
End: 2025-01-09
Payer: COMMERCIAL

## 2025-01-09 VITALS
SYSTOLIC BLOOD PRESSURE: 138 MMHG | WEIGHT: 211.6 LBS | HEART RATE: 40 BPM | RESPIRATION RATE: 12 BRPM | HEIGHT: 75 IN | DIASTOLIC BLOOD PRESSURE: 58 MMHG | BODY MASS INDEX: 26.31 KG/M2

## 2025-01-09 DIAGNOSIS — I48.19 PERSISTENT ATRIAL FIBRILLATION (H): Primary | ICD-10-CM

## 2025-01-09 LAB
ATRIAL RATE - MUSE: 43 BPM
DIASTOLIC BLOOD PRESSURE - MUSE: NORMAL MMHG
INTERPRETATION ECG - MUSE: NORMAL
P AXIS - MUSE: 57 DEGREES
PR INTERVAL - MUSE: 188 MS
QRS DURATION - MUSE: 98 MS
QT - MUSE: 528 MS
QTC - MUSE: 446 MS
R AXIS - MUSE: 44 DEGREES
SYSTOLIC BLOOD PRESSURE - MUSE: NORMAL MMHG
T AXIS - MUSE: 78 DEGREES
VENTRICULAR RATE- MUSE: 43 BPM

## 2025-01-09 RX ORDER — METOPROLOL SUCCINATE 25 MG/1
50 TABLET, EXTENDED RELEASE ORAL DAILY
Qty: 30 TABLET | Refills: 3 | Status: SHIPPED | OUTPATIENT
Start: 2025-01-09

## 2025-01-09 NOTE — LETTER
2025    eGrald Mota PA-C  [address]  [facility] [address]    RE: Ruy Gibbs       Dear Colleague,     I had the pleasure of seeing Ruy Gibbs in the [facility].     [facility]  Cardiac Electrophysiology  [address]  [place] [address]   Office: [phone]  Fax: [phone]     Cardiac Electrophysiology Consultation    Patient: Ruy Gibbs   : 1945     Referring Provider: Harinder Turner PA-C  Primary Care Provider: Gerald Mota PA-C  Primary Cardiologist: Dallas Peralta MD    CHIEF COMPLAINT/REASON FOR CONSULTATION  Persistent atrial fibrillation    Assessment/Recommendations     Persistent atrial fibrillation: Patient without clear cut symptoms at this time, but has not had a trial of sinus rhythm. Patient had ERAF post attempted CV in Dec and was started on amiodarone.  The patient has spontaneously converted to sinus rhythm on her own as demonstrated by his twelve-lead EKG today.  As far the patient has noted no substantial change in his overall clinical status.  We reviewed the underlying pathophysiology of atrial fibrillation as well as management considerations including: stroke risk, rate control, cardioversion, antiarrhythmic drug therapy, and catheter ablation. We could consider mapping/ablation of his arrhythmia depending on his clinical response to restoration of AV synchrony.   Long-term strategies for atrial fibrillation management were discussed including: maintaining a healthy weight, assessment for sleep apnea, and alcohol avoidance.    [id number] score 4.  Rx: Apixaban  Arrhythmia hx  Dx/date: >10 years  Sx: Assessing, patient just spontaneously converted back to NSR.  Prior AAD, AV ashley blocking agents: Metoprolol, sotalol  Procedures  DCCV: 2024  Ablation: N/A    Essential hypertension: Chronic problem for the patient and his blood pressure today is at target on current  "medical therrapy.    Follow up/recommendations:   Patient will be scheduled for follow-up in the arrhythmia clinic with Harinder Turner PA-C in 6-8 weeks to discuss patient's clinical status and determine if he has observed a clinical benefit from restoration of AV synchrony.    Continue current medical therapy with decrease in the metoprolol dose to 25 mg daily.  This may need to be discontinued if he remains bradycardic at his 6-week follow-up.    Time spent: 60 minutes spent on the date of the encounter doing chart review, history and exam, documentation and further activities as noted above.       History of Present Illness   Ruy Gibbs is a 79 year old male with essential hypertension and persistent atrial fibrillation referred by Dr. Dallas Peralta for consultation regarding management of persistent atrial fibrillation.  The patient had his atrial fibrillation discovered incidentally at the time of evaluation for his back pain.  The patient underwent attempted cardioversion with brief success and early recurrent atrial fibrillation (ERAF) in early December.  Patient was started on amiodarone and reports that his pulse rates have been on the slow side (40s) but otherwise reports no noticeable change in his energy or stamina.       Physical Examination  Review of Systems   VITALS: /58 (BP Location: Left arm, Patient Position: Sitting, Cuff Size: Adult Regular)   Pulse (!) 40   Resp 12   Ht 1.905 m (6' 3\")   Wt 96 kg (211 lb 9.6 oz)   BMI 26.45 kg/m      Wt Readings from Last 3 Encounters:   12/05/24 96.1 kg (211 lb 12.8 oz)   11/27/24 95.3 kg (210 lb)   11/07/24 96.9 kg (213 lb 9.6 oz)     CONSTITUTIONAL: well nourished, comfortable, no distress  EYES:  Conjunctivae pink, sclerae clear.    E/N/T:  Oral mucosa pink  RESPIRATORY:  Respiratory effort is normal  CARDIOVASCULAR: Regular rate and rhythm with normal S1 and S2 and mild bradycardia.  GASTROINTESTINAL:  Abdomen without masses or " tenderness  EXTREMITIES:  No clubbing or cyanosis.    MUSCULOSKELETAL:  Overall grossly normal muscle strength  SKIN:  Overall, skin warm and dry, no lesions.  NEURO/PSYCH:  Oriented x 3 with normal affect.   Constitutional:  No weight loss or loss of appetite    Eyes:  No difficulty with vision, no double vision, no dry eyes  ENT:  No sore throat, difficulty swallowing; changes in hearing or tinnitus  Cardiovascular: As detailed above  Respiratory:  No cough  Musculoskeletal  No joint pain, muscle aches  Neurologic:  No syncope, lightheadedness, fainting spells   Hematologic: No easy bruising, excessive bleeding tendency   Gastrointestinal:  No jaundice, abdominal pain or abdominal bloating  Genitourinary: No changes in urinary habits, no trouble urinating    Psychiatric: No anxiety or depression      Medical History  Surgical History   Past Medical History:   Diagnosis Date     Benign essential hypertension 2018     Diabetes mellitus type 2, noninsulin dependent (H) 2018     Hyperlipemia      Reflux esophagitis     Past Surgical History:   Procedure Laterality Date     CHOLECYSTECTOMY  2014    dr. [facility] at [facility]     HC REMOVAL OF TONSILS,<11 Y/O      Description: Tonsillectomy;  Recorded: 2008;     INGUINAL HERNIA REPAIR Left 2016    Procedure: LEFT INGUINAL HERNIA REPAIR;  Surgeon: Drew Way MD;  Location: [facility];  Service:      OTHER SURGICAL HISTORY [facility] oblique myomectomy at same time     TONSILLECTOMY           Family History Social History   Family History   Problem Relation Age of Onset     Prostate Cancer Father 71.00        early 70s        Social History     Tobacco Use     Smoking status: Former     Current packs/day: 0.00     Types: Cigarettes     Quit date: 1969     Years since quittin.0     Passive exposure: Never     Smokeless tobacco: Never   Vaping Use      Vaping status: Never Used   Substance Use Topics     Alcohol use: Yes     Alcohol/week: 1.0 standard drink of alcohol     Comment: Alcoholic Drinks/day: occassional     Drug use: No         Medications  Allergies     Current Outpatient Medications:      amiodarone (PACERONE) 200 MG tablet, Take 1 tablet (200 mg) by mouth 2 times daily for 14 days, THEN 1 tablet (200 mg) daily., Disp: 118 tablet, Rfl: 0     apixaban ANTICOAGULANT (ELIQUIS) 5 MG tablet, Take 1 tablet (5 mg) by mouth 2 times daily., Disp: 180 tablet, Rfl: 3     atorvastatin (LIPITOR) 40 MG tablet, TAKE ONE TABLET BY MOUTH ONE TIME DAILY, Disp: 90 tablet, Rfl: 2     Chondroitin Sulfate 400 MG CAPS, Take 1,200 mg by mouth daily., Disp: , Rfl:      cyclobenzaprine (FLEXERIL) 5 MG tablet, Take 1-2 tablets (5-10 mg) by mouth 3 times daily as needed for muscle spasms, Disp: 30 tablet, Rfl: 2     Glucos-MSM-C-Zm-Snpnlz-Taaxdp (GLUCOSAMINE MSM COMPLEX) TABS tablet, Take 2 tablets by mouth daily., Disp: , Rfl:      losartan (COZAAR) 50 MG tablet, Take 1 tablet (50 mg) by mouth daily., Disp: 90 tablet, Rfl: 0     metoprolol succinate ER (TOPROL XL) 50 MG 24 hr tablet, Take 1 tablet (50 mg) by mouth daily. Continue metoprolol. Can overlap with the amiodarone by 1 week, then stop 11/14/24., Disp: 30 tablet, Rfl: 0     multivitamin therapeutic (THERAGRAN) tablet, [MULTIVITAMIN THERAPEUTIC (THERAGRAN) TABLET] Take 1 tablet by mouth daily., Disp: , Rfl:      omeprazole (PRILOSEC) 20 MG DR capsule, Take 1 capsule (20 mg) by mouth every other day. Take first thing in AM on empty stomach, Disp: 90 capsule, Rfl: 1   No Known Allergies       Lab Results    Chemistry CBC Cardiac Enzymes/BNP/TSH/INR   Recent Labs   Lab Test 11/05/24  1125      POTASSIUM 3.7   CHLORIDE 105   CO2 26   *   BUN 20.0   CR 0.84   GFRESTIMATED 89   MITALI 9.2     Recent Labs   Lab Test 11/05/24  1125 07/12/24  1127 01/10/24  1200   CR 0.84 0.82 0.81          Recent Labs   Lab Test  "11/05/24  1125   WBC 7.3   HGB 14.8   HCT 45.9   MCV 93        Recent Labs   Lab Test 11/05/24  1125 01/10/24  1200 11/30/22  0959   HGB 14.8 15.3 15.1    No results for input(s): \"TROPONINI\" in the last 65132 hours.  Recent Labs   Lab Test 11/05/24  1125   NTBNPI 104     Recent Labs   Lab Test 11/05/24  1125   TSH 0.99     No results for input(s): \"INR\" in the last 92072 hours.      Data Review    ECGs dated 11/27/2024  (tracings independently reviewed)  Atrial fibrillation with controlled ventricular response.  No acute changes.  ECG today: Normal sinus rhythm/sinus bradycardia with rates in the mid 40s.  No acute changes.    Echocardiogram (TTE) dated 11/22/2024  1. The left ventricle is normal in size. Left ventricular systolic performance  is at the lower limits of normal. The ejection fraction is estimated to be  50%.  2. No significant valvular heart disease is identified on this study.  3. Normal right ventricular size with low normal right ventricular systolic  performance.  4. There is mild left atrial enlargement.             Cc:         Thank you for allowing me to participate in the care of your patient.      Sincerely,     Yaya Marin MD     [facility]  cc:   Yaya Marin MD  [address]  [place] [address]      "

## 2025-01-09 NOTE — PROGRESS NOTES
[facility]  Cardiac Electrophysiology  [address]  [place] [address]   Office: [phone]  Fax: [phone]     Cardiac Electrophysiology Consultation    Patient: Ruy Gibbs   : 1945     Referring Provider: Harinder Turner PA-C  Primary Care Provider: Gerald Mota PA-C  Primary Cardiologist: Dallas Peralta MD    CHIEF COMPLAINT/REASON FOR CONSULTATION  Persistent atrial fibrillation    Assessment/Recommendations     Persistent atrial fibrillation: Patient without clear cut symptoms at this time, but has not had a trial of sinus rhythm. Patient had ERAF post attempted CV in Dec and was started on amiodarone.  The patient has spontaneously converted to sinus rhythm on her own as demonstrated by his twelve-lead EKG today.  As far the patient has noted no substantial change in his overall clinical status.  We reviewed the underlying pathophysiology of atrial fibrillation as well as management considerations including: stroke risk, rate control, cardioversion, antiarrhythmic drug therapy, and catheter ablation. We could consider mapping/ablation of his arrhythmia depending on his clinical response to restoration of AV synchrony.   Long-term strategies for atrial fibrillation management were discussed including: maintaining a healthy weight, assessment for sleep apnea, and alcohol avoidance.    [id number] score 4.  Rx: Apixaban  Arrhythmia hx  Dx/date: >10 years  Sx: Assessing, patient just spontaneously converted back to NSR.  Prior AAD, AV ashley blocking agents: Metoprolol, sotalol  Procedures  DCCV: 2024  Ablation: N/A    Essential hypertension: Chronic problem for the patient and his blood pressure today is at target on current medical therrapy.    Follow up/recommendations:   Patient will be scheduled for follow-up in the arrhythmia clinic with Harinder Turner PA-C in 6-8 weeks to discuss patient's clinical status and determine if he has observed a  "clinical benefit from restoration of AV synchrony.    Continue current medical therapy with decrease in the metoprolol dose to 25 mg daily.  This may need to be discontinued if he remains bradycardic at his 6-week follow-up.    Time spent: 60 minutes spent on the date of the encounter doing chart review, history and exam, documentation and further activities as noted above.       History of Present Illness   Ruy Gibbs is a 79 year old male with essential hypertension and persistent atrial fibrillation referred by Dr. Dallas Peralta for consultation regarding management of persistent atrial fibrillation.  The patient had his atrial fibrillation discovered incidentally at the time of evaluation for his back pain.  The patient underwent attempted cardioversion with brief success and early recurrent atrial fibrillation (ERAF) in early December.  Patient was started on amiodarone and reports that his pulse rates have been on the slow side (40s) but otherwise reports no noticeable change in his energy or stamina.       Physical Examination  Review of Systems   VITALS: /58 (BP Location: Left arm, Patient Position: Sitting, Cuff Size: Adult Regular)   Pulse (!) 40   Resp 12   Ht 1.905 m (6' 3\")   Wt 96 kg (211 lb 9.6 oz)   BMI 26.45 kg/m      Wt Readings from Last 3 Encounters:   12/05/24 96.1 kg (211 lb 12.8 oz)   11/27/24 95.3 kg (210 lb)   11/07/24 96.9 kg (213 lb 9.6 oz)     CONSTITUTIONAL: well nourished, comfortable, no distress  EYES:  Conjunctivae pink, sclerae clear.    E/N/T:  Oral mucosa pink  RESPIRATORY:  Respiratory effort is normal  CARDIOVASCULAR: Regular rate and rhythm with normal S1 and S2 and mild bradycardia.  GASTROINTESTINAL:  Abdomen without masses or tenderness  EXTREMITIES:  No clubbing or cyanosis.    MUSCULOSKELETAL:  Overall grossly normal muscle strength  SKIN:  Overall, skin warm and dry, no lesions.  NEURO/PSYCH:  Oriented x 3 with normal affect.   Constitutional:  No weight " loss or loss of appetite    Eyes:  No difficulty with vision, no double vision, no dry eyes  ENT:  No sore throat, difficulty swallowing; changes in hearing or tinnitus  Cardiovascular: As detailed above  Respiratory:  No cough  Musculoskeletal  No joint pain, muscle aches  Neurologic:  No syncope, lightheadedness, fainting spells   Hematologic: No easy bruising, excessive bleeding tendency   Gastrointestinal:  No jaundice, abdominal pain or abdominal bloating  Genitourinary: No changes in urinary habits, no trouble urinating    Psychiatric: No anxiety or depression      Medical History  Surgical History   Past Medical History:   Diagnosis Date    Benign essential hypertension 2018    Diabetes mellitus type 2, noninsulin dependent (H) 2018    Hyperlipemia     Reflux esophagitis     Past Surgical History:   Procedure Laterality Date    CHOLECYSTECTOMY  2014    dr. Way at [facility]    HC REMOVAL OF TONSILS,<13 Y/O      Description: Tonsillectomy;  Recorded: 2008;    INGUINAL HERNIA REPAIR Left 2016    Procedure: LEFT INGUINAL HERNIA REPAIR;  Surgeon: Drew Way MD;  Location: [facility];  Service:     OTHER [facility]GICAL HISTORY [facility] at same time    TONSILLECTOMY           Family History Social History   Family History   Problem Relation Age of Onset    Prostate Cancer Father 71.00        early 70s        Social History     Tobacco Use    Smoking status: Former     Current packs/day: 0.00     Types: Cigarettes     Quit date: 1969     Years since quittin.0     Passive exposure: Never    Smokeless tobacco: Never   Vaping Use    Vaping status: Never Used   Substance Use Topics    Alcohol use: Yes     Alcohol/week: 1.0 standard drink of alcohol     Comment: Alcoholic Drinks/day: occassional    Drug use: No         Medications  Allergies     Current Outpatient Medications:  "    amiodarone (PACERONE) 200 MG tablet, Take 1 tablet (200 mg) by mouth 2 times daily for 14 days, THEN 1 tablet (200 mg) daily., Disp: 118 tablet, Rfl: 0    apixaban ANTICOAGULANT (ELIQUIS) 5 MG tablet, Take 1 tablet (5 mg) by mouth 2 times daily., Disp: 180 tablet, Rfl: 3    atorvastatin (LIPITOR) 40 MG tablet, TAKE ONE TABLET BY MOUTH ONE TIME DAILY, Disp: 90 tablet, Rfl: 2    Chondroitin Sulfate 400 MG CAPS, Take 1,200 mg by mouth daily., Disp: , Rfl:     cyclobenzaprine (FLEXERIL) 5 MG tablet, Take 1-2 tablets (5-10 mg) by mouth 3 times daily as needed for muscle spasms, Disp: 30 tablet, Rfl: 2    Glucos-MSM-C-Um-Gvdbmq-Njeldy (GLUCOSAMINE MSM COMPLEX) TABS tablet, Take 2 tablets by mouth daily., Disp: , Rfl:     losartan (COZAAR) 50 MG tablet, Take 1 tablet (50 mg) by mouth daily., Disp: 90 tablet, Rfl: 0    metoprolol succinate ER (TOPROL XL) 50 MG 24 hr tablet, Take 1 tablet (50 mg) by mouth daily. Continue metoprolol. Can overlap with the amiodarone by 1 week, then stop 11/14/24., Disp: 30 tablet, Rfl: 0    multivitamin therapeutic (THERAGRAN) tablet, [MULTIVITAMIN THERAPEUTIC (THERAGRAN) TABLET] Take 1 tablet by mouth daily., Disp: , Rfl:     omeprazole (PRILOSEC) 20 MG DR capsule, Take 1 capsule (20 mg) by mouth every other day. Take first thing in AM on empty stomach, Disp: 90 capsule, Rfl: 1   No Known Allergies       Lab Results    Chemistry CBC Cardiac Enzymes/BNP/TSH/INR   Recent Labs   Lab Test 11/05/24  1125      POTASSIUM 3.7   CHLORIDE 105   CO2 26   *   BUN 20.0   CR 0.84   GFRESTIMATED 89   MITALI 9.2     Recent Labs   Lab Test 11/05/24  1125 07/12/24  1127 01/10/24  1200   CR 0.84 0.82 0.81          Recent Labs   Lab Test 11/05/24  1125   WBC 7.3   HGB 14.8   HCT 45.9   MCV 93        Recent Labs   Lab Test 11/05/24  1125 01/10/24  1200 11/30/22  0959   HGB 14.8 15.3 15.1    No results for input(s): \"TROPONINI\" in the last 68843 hours.  Recent Labs   Lab Test 11/05/24  1125 " "  NTBNPI 104     Recent Labs   Lab Test 11/05/24  1125   TSH 0.99     No results for input(s): \"INR\" in the last 96126 hours.      Data Review    ECGs dated 11/27/2024  (tracings independently reviewed)  Atrial fibrillation with controlled ventricular response.  No acute changes.  ECG today: Normal sinus rhythm/sinus bradycardia with rates in the mid 40s.  No acute changes.    Echocardiogram (TTE) dated 11/22/2024  1. The left ventricle is normal in size. Left ventricular systolic performance  is at the lower limits of normal. The ejection fraction is estimated to be  50%.  2. No significant valvular heart disease is identified on this study.  3. Normal right ventricular size with low normal right ventricular systolic  performance.  4. There is mild left atrial enlargement.             Cc:       "

## 2025-01-13 ENCOUNTER — OFFICE VISIT (OUTPATIENT)
Dept: FAMILY MEDICINE | Facility: CLINIC | Age: 80
End: 2025-01-13
Attending: PHYSICIAN ASSISTANT
Payer: COMMERCIAL

## 2025-01-13 VITALS
RESPIRATION RATE: 12 BRPM | HEART RATE: 54 BPM | SYSTOLIC BLOOD PRESSURE: 132 MMHG | TEMPERATURE: 97.8 F | OXYGEN SATURATION: 96 % | DIASTOLIC BLOOD PRESSURE: 74 MMHG | HEIGHT: 75 IN | WEIGHT: 211 LBS | BODY MASS INDEX: 26.24 KG/M2

## 2025-01-13 DIAGNOSIS — I48.19 PERSISTENT ATRIAL FIBRILLATION (H): ICD-10-CM

## 2025-01-13 DIAGNOSIS — I10 BENIGN ESSENTIAL HYPERTENSION: Primary | ICD-10-CM

## 2025-01-13 DIAGNOSIS — E11.9 DIABETES MELLITUS TYPE 2, NONINSULIN DEPENDENT (H): ICD-10-CM

## 2025-01-13 DIAGNOSIS — M48.20 BAASTRUP'S SYNDROME: ICD-10-CM

## 2025-01-13 DIAGNOSIS — N28.1 RENAL CYST: ICD-10-CM

## 2025-01-13 DIAGNOSIS — E78.5 HYPERLIPIDEMIA, UNSPECIFIED HYPERLIPIDEMIA TYPE: ICD-10-CM

## 2025-01-13 DIAGNOSIS — M54.9 BACK PAIN, UNSPECIFIED BACK LOCATION, UNSPECIFIED BACK PAIN LATERALITY, UNSPECIFIED CHRONICITY: ICD-10-CM

## 2025-01-13 LAB
ALBUMIN SERPL BCG-MCNC: 4.4 G/DL (ref 3.5–5.2)
ALP SERPL-CCNC: 71 U/L (ref 40–150)
ALT SERPL W P-5'-P-CCNC: 18 U/L (ref 0–70)
ANION GAP SERPL CALCULATED.3IONS-SCNC: 11 MMOL/L (ref 7–15)
AST SERPL W P-5'-P-CCNC: 21 U/L (ref 0–45)
BILIRUB SERPL-MCNC: 1 MG/DL
BUN SERPL-MCNC: 20.6 MG/DL (ref 8–23)
CALCIUM SERPL-MCNC: 9.7 MG/DL (ref 8.8–10.4)
CHLORIDE SERPL-SCNC: 106 MMOL/L (ref 98–107)
CHOLEST SERPL-MCNC: 123 MG/DL
CREAT SERPL-MCNC: 0.9 MG/DL (ref 0.67–1.17)
CREAT UR-MCNC: 183 MG/DL
EGFRCR SERPLBLD CKD-EPI 2021: 87 ML/MIN/1.73M2
EST. AVERAGE GLUCOSE BLD GHB EST-MCNC: 137 MG/DL
FASTING STATUS PATIENT QL REPORTED: YES
FASTING STATUS PATIENT QL REPORTED: YES
GLUCOSE SERPL-MCNC: 130 MG/DL (ref 70–99)
HBA1C MFR BLD: 6.4 % (ref 0–5.6)
HCO3 SERPL-SCNC: 25 MMOL/L (ref 22–29)
HDLC SERPL-MCNC: 45 MG/DL
LDLC SERPL CALC-MCNC: 64 MG/DL
MICROALBUMIN UR-MCNC: 25.9 MG/L
MICROALBUMIN/CREAT UR: 14.15 MG/G CR (ref 0–17)
NONHDLC SERPL-MCNC: 78 MG/DL
POTASSIUM SERPL-SCNC: 4.8 MMOL/L (ref 3.4–5.3)
PROT SERPL-MCNC: 6.8 G/DL (ref 6.4–8.3)
SODIUM SERPL-SCNC: 142 MMOL/L (ref 135–145)
TRIGL SERPL-MCNC: 70 MG/DL
TSH SERPL DL<=0.005 MIU/L-ACNC: 1.13 UIU/ML (ref 0.3–4.2)

## 2025-01-13 PROCEDURE — 36415 COLL VENOUS BLD VENIPUNCTURE: CPT | Performed by: PHYSICIAN ASSISTANT

## 2025-01-13 PROCEDURE — 80061 LIPID PANEL: CPT | Performed by: PHYSICIAN ASSISTANT

## 2025-01-13 PROCEDURE — 82570 ASSAY OF URINE CREATININE: CPT | Performed by: PHYSICIAN ASSISTANT

## 2025-01-13 PROCEDURE — 83036 HEMOGLOBIN GLYCOSYLATED A1C: CPT | Performed by: PHYSICIAN ASSISTANT

## 2025-01-13 PROCEDURE — 82043 UR ALBUMIN QUANTITATIVE: CPT | Performed by: PHYSICIAN ASSISTANT

## 2025-01-13 PROCEDURE — 99214 OFFICE O/P EST MOD 30 MIN: CPT | Performed by: PHYSICIAN ASSISTANT

## 2025-01-13 PROCEDURE — 84443 ASSAY THYROID STIM HORMONE: CPT | Performed by: PHYSICIAN ASSISTANT

## 2025-01-13 PROCEDURE — 80053 COMPREHEN METABOLIC PANEL: CPT | Performed by: PHYSICIAN ASSISTANT

## 2025-01-13 ASSESSMENT — ENCOUNTER SYMPTOMS
CHILLS: 0
SORE THROAT: 0
PALPITATIONS: 0
SEIZURES: 0
ARTHRALGIAS: 0
VOMITING: 0
HEMATURIA: 0
COLOR CHANGE: 0
DYSURIA: 0
FEVER: 0
SHORTNESS OF BREATH: 0
ABDOMINAL PAIN: 0
COUGH: 0
BACK PAIN: 1
EYE PAIN: 0

## 2025-01-13 NOTE — PATIENT INSTRUCTIONS
Please call the radiology dep at [facility] to schedule your test today at [phone]

## 2025-01-13 NOTE — PROGRESS NOTES
Assessment & Plan     Benign essential hypertension  Blood pressure well-controlled at 132/72.  Continue with lisinopril losartan and metoprolol    Hyperlipidemia, unspecified hyperlipidemia type  Currently on Lipitor.  Last lipid panel a year ago was stable.  He is fasting so we will recheck lipid panel today.    Diabetes mellitus type 2, noninsulin dependent (H)  Currently diet controlled.  Last A1c 7/24 stable at 6.6.  We did discuss starting him on metformin 500 mg daily if his A1c continues to rise especially in the setting of new onset A-fib.  Will check a A1c, complete metabolic panel and a microalbumin.  Continue to watch diet and stay active.  Recommended annual eye exams.  - Hemoglobin A1c; Future  - Lipid panel reflex to direct LDL Non-fasting; Future  - Albumin Random Urine Quantitative with Creat Ratio; Future  - Comprehensive metabolic panel (BMP + Alb, Alk Phos, ALT, AST, Total. Bili, TP); Future  - Hemoglobin A1c  - Lipid panel reflex to direct LDL Non-fasting  - Albumin Random Urine Quantitative with Creat Ratio  - Comprehensive metabolic panel (BMP + Alb, Alk Phos, ALT, AST, Total. Bili, TP)    Persistent atrial fibrillation (H)  Was noted to be in A-fib while undergoing a lumbar ablation.  The ablation was put on hold.  He was sent to the emergency department.  He is asymptomatic.  Status post cardioversion December 5, 2024.  Currently on metoprolol, losartan, Eliquis, and amiodarone.  Cardiac echo 11/2024 showed an EF of 50% without any valvular dysfunction.  Currently rate controlled today.  Recheck TSH level today.  - TSH with free T4 reflex; Future  - TSH with free T4 reflex    Baastrup's syndrome  Back pain, unspecified back location, unspecified back pain laterality, unspecified chronicity  Lumbar stenosis noted on MRI 8/13/2024.  Had planned undergo a lumbar ablation but was found to be in A-fib so this was postponed.  Continues to have lower back pain and stiffness down his left leg as  well.  No new or worsening symptoms.  He will call to set up the ablation.  Cardiology has cleared him for the procedure.  Cardiology also recommended not stopping anticoagulation for the ablation.    Renal cyst  Incidentally found on CT abdomen pelvis.  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  Recommended 6-month follow-up.  Will repeat ultrasound in February.  - US Renal Complete Non-Vascular; Future       Follow-up Visit   Expected date:  Jul 13, 2025 (Approximate)      Follow Up Appointment Details:     Follow-up with whom?: Me    Follow-Up for what?: Adult Preventive    Any Additional Chronic Condition Management?: General (Other)    How?: In Person                 Current Outpatient Medications   Medication Sig Dispense Refill    amiodarone (PACERONE) 200 MG tablet Take 1 tablet (200 mg) by mouth 2 times daily for 14 days, THEN 1 tablet (200 mg) daily. 118 tablet 0    apixaban ANTICOAGULANT (ELIQUIS) 5 MG tablet Take 1 tablet (5 mg) by mouth 2 times daily. 180 tablet 3    atorvastatin (LIPITOR) 40 MG tablet TAKE ONE TABLET BY MOUTH ONE TIME DAILY 90 tablet 2    Chondroitin Sulfate 400 MG CAPS Take 1,200 mg by mouth daily.      cyclobenzaprine (FLEXERIL) 5 MG tablet Take 1-2 tablets (5-10 mg) by mouth 3 times daily as needed for muscle spasms 30 tablet 2    Glucos-MSM-C-Oy-Qcstnk-Rnoson (GLUCOSAMINE MSM COMPLEX) TABS tablet Take 2 tablets by mouth daily.      losartan (COZAAR) 50 MG tablet Take 1 tablet (50 mg) by mouth daily. 90 tablet 0    metoprolol succinate ER (TOPROL XL) 25 MG 24 hr tablet Take 2 tablets (50 mg) by mouth daily. Continue metoprolol. Can overlap with the amiodarone by 1 week, then stop 11/14/24. 30 tablet 3    multivitamin therapeutic (THERAGRAN) tablet [MULTIVITAMIN THERAPEUTIC (THERAGRAN) TABLET] Take 1 tablet by mouth daily.      omeprazole (PRILOSEC) 20 MG DR capsule Take 1 capsule  "(20 mg) by mouth every other day. Take first thing in AM on empty stomach 90 capsule 1     No current facility-administered medications for this visit.         BMI  Estimated body mass index is 26.37 kg/m  as calculated from the following:    Height as of this encounter: 1.905 m (6' 3\").    Weight as of this encounter: 95.7 kg (211 lb).   Weight management plan: Discussed healthy diet and exercise guidelines        Subjective   Ruy is a 79 year old, presenting for the following health issues:  Back Pain (LBP and L hip pain) and RECHECK (Pt was suppose to have ablation on back. Procedure was canceled due to Afib. Saw cardiology on 1/9/25 and cleared him to get ablation. Has not scheduled procedure yet. Has follow-up with Cardiology in March 2025. )        1/13/2025     9:40 AM   Additional Questions   Roomed by Muna Castrejon   Accompanied by none     Ruy is a pleasant 79-year-old male who presents to the clinic today for chronic disease management follow-up.  Past medical history significant for hypertension, hyperlipidemia, type 2 diabetes, new onset atrial fibrillation, lumbar stenosis, and renal cyst.  Overall he is doing well.    November 2024 was planning to undergo lumbar ablation but was found to be in atrial fibrillation.  He was then sent to the emergency department.  At that time he was started on metoprolol and apixaban.  He is now status post cardioversion as of December 5, 2024.  Post cardioversion EKG shows that he converted to sinus rhythm.  Last cardiology appointment 1/9.  Continues on metoprolol 25 mg, amiodarone 200 mg, and Eliquis 5 mg daily.    Continuing to have lower back stiffness and pain.  MRI lumbar spine 8/13/2024 showed L2-L3 mild stenosis and L3-L4 mild to moderate stenosis.  He will be following up with spine clinic to undergo ablation.    History of Present Illness       Back Pain:  He presents for follow up of back pain. Patient's back pain is a recurring problem.  Location of " "back pain:  Left lower back  Description of back pain: dull ache  Back pain spreads: left buttocks    Since patient first noticed back pain, pain is: gradually improving  Does back pain interfere with his job:  Not applicable       He eats 0-1 servings of fruits and vegetables daily.He consumes 2 sweetened beverage(s) daily.He exercises with enough effort to increase his heart rate 10 to 19 minutes per day.  He exercises with enough effort to increase his heart rate 3 or less days per week.   He is taking medications regularly.       Review of Systems   Constitutional:  Negative for chills and fever.   HENT:  Negative for ear pain and sore throat.    Eyes:  Negative for pain and visual disturbance.   Respiratory:  Negative for cough and shortness of breath.    Cardiovascular:  Negative for chest pain and palpitations.   Gastrointestinal:  Negative for abdominal pain and vomiting.   Genitourinary:  Negative for dysuria and hematuria.   Musculoskeletal:  Positive for back pain. Negative for arthralgias.   Skin:  Negative for color change and rash.   Neurological:  Negative for seizures and syncope.   All other systems reviewed and are negative.          Objective    /74 (BP Location: Right arm, Patient Position: Sitting, Cuff Size: Adult Regular)   Pulse 54   Temp 97.8  F (36.6  C) (Oral)   Resp 12   Ht 1.905 m (6' 3\")   Wt 95.7 kg (211 lb)   SpO2 96%   BMI 26.37 kg/m    Body mass index is 26.37 kg/m .  Physical Exam  Vitals and nursing note reviewed.   Constitutional:       General: He is not in acute distress.     Appearance: Normal appearance. He is not ill-appearing, toxic-appearing or diaphoretic.   HENT:      Head: Normocephalic and atraumatic.   Eyes:      Conjunctiva/sclera: Conjunctivae normal.   Cardiovascular:      Rate and Rhythm: Normal rate and regular rhythm.      Heart sounds: No murmur heard.     No friction rub. No gallop.   Pulmonary:      Effort: Pulmonary effort is normal.      Breath " sounds: No wheezing, rhonchi or rales.   Musculoskeletal:      Right lower leg: No edema.      Left lower leg: No edema.   Skin:     General: Skin is warm and dry.      Findings: No rash.   Neurological:      General: No focal deficit present.      Mental Status: He is alert and oriented to person, place, and time.      Motor: No weakness.   Psychiatric:         Mood and Affect: Mood normal.         Behavior: Behavior normal.         Thought Content: Thought content normal.         Judgment: Judgment normal.                  Signed Electronically by: Gerald Mota PA-C

## 2025-01-16 ENCOUNTER — ORDERS ONLY (AUTO-RELEASED) (OUTPATIENT)
Dept: CARDIOLOGY | Facility: CLINIC | Age: 80
End: 2025-01-16
Payer: COMMERCIAL

## 2025-01-16 DIAGNOSIS — I48.19 PERSISTENT ATRIAL FIBRILLATION (H): ICD-10-CM

## 2025-01-20 ENCOUNTER — TELEPHONE (OUTPATIENT)
Dept: CARDIOLOGY | Facility: CLINIC | Age: 80
End: 2025-01-20

## 2025-01-20 NOTE — TELEPHONE ENCOUNTER
Returned phone call to pt. Recommended pt contact [employer] regarding patch issues and for possible replacement of patches. Pt verbalized understanding and will reach out to [employer].     Libertad Campbell RN

## 2025-01-20 NOTE — TELEPHONE ENCOUNTER
[facility] Call Center    Phone Message    May a detailed message be left on voicemail: yes     Reason for Call: Other: Pt called in regards to a Zio XT that was sent to him. The monitor isn't sticking to him very well and continues to fall off. Please call pt back to discuss options, or to send another monitor     Action Taken: Other: [facility]    Travel Screening: Not Applicable     Date of Service:

## 2025-02-04 ENCOUNTER — TELEPHONE (OUTPATIENT)
Dept: CARDIOLOGY | Facility: CLINIC | Age: 80
End: 2025-02-04
Payer: COMMERCIAL

## 2025-02-04 DIAGNOSIS — I48.19 PERSISTENT ATRIAL FIBRILLATION (H): Primary | ICD-10-CM

## 2025-02-04 NOTE — TELEPHONE ENCOUNTER
[facility] Call Center    Phone Message    May a detailed message be left on voicemail: yes     Reason for Call: Other: Pt sent his Zio patch back to . Pt hasn't received any guidance on how to move forward. Please call pt back to discuss     Action Taken: Other: [facility]    Travel Screening: Not Applicable     Date of Service:

## 2025-02-04 NOTE — TELEPHONE ENCOUNTER
1st Attempt to contact pt. LVM with direct number for return call.    7 day Zio monitor ordered by Dr. Marin. On 1/20 pt called with issue of Zio patch not sticking. It was recommended he contact [employer]. No further encounters.     Libertad Campbell RN

## 2025-02-05 ENCOUNTER — ORDERS ONLY (AUTO-RELEASED) (OUTPATIENT)
Dept: CARDIOLOGY | Facility: CLINIC | Age: 80
End: 2025-02-05
Payer: COMMERCIAL

## 2025-02-05 DIAGNOSIS — I48.19 PERSISTENT ATRIAL FIBRILLATION (H): ICD-10-CM

## 2025-02-05 NOTE — TELEPHONE ENCOUNTER
Pt returned phone call and reports he returned his defective monitor and [facility] was going to reach out to clinic for new orders.     Zio monitor for 7 days was reordered at this time.    Libertad Campbell RN

## 2025-02-12 ENCOUNTER — TELEPHONE (OUTPATIENT)
Dept: FAMILY MEDICINE | Facility: CLINIC | Age: 80
End: 2025-02-12

## 2025-02-12 NOTE — TELEPHONE ENCOUNTER
Pt wanted appt scheduled for 2/13/225.  TC rescheduled for the morning of 2/13/2025 and he does have The [employer]io Patch and will be bringing it with you.    González Avila

## 2025-02-12 NOTE — TELEPHONE ENCOUNTER
Outreach to patient. Left message to call back. When he calls, please ask the follow: does he have the zio patch to put on?          He's on the MA schedule today for zio patch placement. Order on file is for the mail out version. Does he have this with him for this appt?

## 2025-02-13 ENCOUNTER — ALLIED HEALTH/NURSE VISIT (OUTPATIENT)
Dept: FAMILY MEDICINE | Facility: CLINIC | Age: 80
End: 2025-02-13
Payer: COMMERCIAL

## 2025-02-18 DIAGNOSIS — I48.19 PERSISTENT ATRIAL FIBRILLATION (H): Primary | ICD-10-CM

## 2025-02-19 ENCOUNTER — ORDERS ONLY (AUTO-RELEASED) (OUTPATIENT)
Dept: CARDIOLOGY | Facility: CLINIC | Age: 80
End: 2025-02-19
Payer: COMMERCIAL

## 2025-02-19 DIAGNOSIS — I48.19 PERSISTENT ATRIAL FIBRILLATION (H): ICD-10-CM

## 2025-02-24 ENCOUNTER — ALLIED HEALTH/NURSE VISIT (OUTPATIENT)
Dept: FAMILY MEDICINE | Facility: CLINIC | Age: 80
End: 2025-02-24
Payer: COMMERCIAL

## 2025-02-24 ENCOUNTER — TELEPHONE (OUTPATIENT)
Dept: CARDIOLOGY | Facility: CLINIC | Age: 80
End: 2025-02-24
Payer: COMMERCIAL

## 2025-02-24 DIAGNOSIS — I48.19 PERSISTENT ATRIAL FIBRILLATION (H): Primary | ICD-10-CM

## 2025-02-24 PROCEDURE — 99207 PR NO CHARGE NURSE ONLY: CPT

## 2025-02-24 NOTE — PROGRESS NOTES
Assisted patient in applying his zio patch.  First 2 had failed.   Applied with no issues, button pushed and flashed green.    ELIANA OwenN RN

## 2025-02-24 NOTE — TELEPHONE ENCOUNTER
Pt called to report that he just received the 3rd Zio and is asking how to apply this monitor as he has had 2 failed applications of the monitor. First time he was told patch was defective and second he took to his pcp clinic and they assisted in application which also fell off. Pt got special tape to help this stick and will try applying monitor a 3rd time. Pt understands to clean/scrub area and let completely dry, not applying any lotion and once applied to hold secure to let adhere to skin.      Libertad Campbell RN

## 2025-02-25 ENCOUNTER — ALLIED HEALTH/NURSE VISIT (OUTPATIENT)
Dept: FAMILY MEDICINE | Facility: CLINIC | Age: 80
End: 2025-02-25
Payer: COMMERCIAL

## 2025-02-25 DIAGNOSIS — I48.19 PERSISTENT ATRIAL FIBRILLATION (H): Primary | ICD-10-CM

## 2025-02-25 PROCEDURE — 99207 PR NO CHARGE NURSE ONLY: CPT

## 2025-02-25 NOTE — PROGRESS NOTES
Patient in for assistance with Zio patch.  Zio Patch stopped working yesterday and shows that it is not adhering to skin well. Writer assisted patient yesterday with this so reinforced with KT tape that he purchased.   Pushed button and received a green flashing light.    Patient will continue to monitor and will call if any more issues.    KAELYN Owen RN

## 2025-03-04 LAB — CV ZIO PRELIM RESULTS: NORMAL

## 2025-03-13 ENCOUNTER — HOSPITAL ENCOUNTER (OUTPATIENT)
Dept: ULTRASOUND IMAGING | Facility: CLINIC | Age: 80
Discharge: HOME | End: 2025-03-13
Attending: PHYSICIAN ASSISTANT
Payer: COMMERCIAL

## 2025-03-13 DIAGNOSIS — N28.1 RENAL CYST: ICD-10-CM

## 2025-03-13 PROCEDURE — 76770 US EXAM ABDO BACK WALL COMP: CPT

## 2025-04-06 ENCOUNTER — HEALTH MAINTENANCE LETTER (OUTPATIENT)
Age: 80
End: 2025-04-06

## 2025-04-09 ENCOUNTER — TELEPHONE (OUTPATIENT)
Dept: CARDIOLOGY | Facility: CLINIC | Age: 80
End: 2025-04-09
Payer: COMMERCIAL

## 2025-04-09 NOTE — TELEPHONE ENCOUNTER
Jarvis Pizano,     This pt was recently seen 3/14:  Plan:  Continue eliquis 5 mg BID for stroke prophylaxis  Continue amiodarone 200 mg daily   Metoprolol 25 mg can continue for now as heart rates have improved. BP tends to run around mid 130's at home, could consider increasing losartan and coming off metoprolol, would defer to primary team  Would like to undergo ablation at some point to get off amiodarone. He needs an ablation for his low back first. He will follow up with us when he would like to get ablation scheduled.       He had his first back ablation yesterday and his pulse was in the 50s.  His MD told him to stop the metoprolol, he was calling us with an update.  I will take it off his medication list and just told him to keep on eye on his BP and follow up with PCP if he notes an increase.    Let me know if you need anything else  Thank you!  Marley

## 2025-04-09 NOTE — TELEPHONE ENCOUNTER
[facility] Call Center    Phone Message    May a detailed message be left on voicemail: yes     Reason for Call: Other: Had an ablation yesterday and the dr suggested he stop taking the:    metoprolol succinate ER (TOPROL XL) 25 MG 24 hr tablet   Due to a low either heart rate or blood pressure he didn't know which one, he has another one scheduled for 4/15/25, if you could follow up and let him know if this is okay to stop or not, that would be great, thanks!        Action Taken: Other: Cardiology    Travel Screening: Not Applicable     Date of Service:

## 2025-04-14 DIAGNOSIS — I10 BENIGN ESSENTIAL HYPERTENSION: ICD-10-CM

## 2025-04-14 RX ORDER — LOSARTAN POTASSIUM 50 MG/1
50 TABLET ORAL DAILY
Qty: 90 TABLET | Refills: 3 | Status: SHIPPED | OUTPATIENT
Start: 2025-04-14

## 2025-06-12 ENCOUNTER — PATIENT OUTREACH (OUTPATIENT)
Dept: CARE COORDINATION | Facility: CLINIC | Age: 80
End: 2025-06-12
Payer: COMMERCIAL

## 2025-06-16 DIAGNOSIS — E78.5 HYPERLIPIDEMIA, UNSPECIFIED HYPERLIPIDEMIA TYPE: ICD-10-CM

## 2025-06-17 RX ORDER — ATORVASTATIN CALCIUM 40 MG/1
40 TABLET, FILM COATED ORAL DAILY
Qty: 90 TABLET | Refills: 1 | Status: SHIPPED | OUTPATIENT
Start: 2025-06-17

## 2025-07-10 SDOH — HEALTH STABILITY: PHYSICAL HEALTH
ON AVERAGE, HOW MANY DAYS PER WEEK DO YOU ENGAGE IN MODERATE TO STRENUOUS EXERCISE (LIKE A BRISK WALK)?: PATIENT DECLINED

## 2025-07-10 SDOH — HEALTH STABILITY: PHYSICAL HEALTH: ON AVERAGE, HOW MANY MINUTES DO YOU ENGAGE IN EXERCISE AT THIS LEVEL?: PATIENT DECLINED

## 2025-07-10 ASSESSMENT — SOCIAL DETERMINANTS OF HEALTH (SDOH): HOW OFTEN DO YOU GET TOGETHER WITH FRIENDS OR RELATIVES?: MORE THAN THREE TIMES A WEEK

## 2025-07-15 ENCOUNTER — OFFICE VISIT (OUTPATIENT)
Dept: FAMILY MEDICINE | Facility: CLINIC | Age: 80
End: 2025-07-15
Payer: COMMERCIAL

## 2025-07-15 VITALS
HEIGHT: 75 IN | WEIGHT: 212 LBS | BODY MASS INDEX: 26.36 KG/M2 | HEART RATE: 64 BPM | TEMPERATURE: 98.6 F | SYSTOLIC BLOOD PRESSURE: 138 MMHG | DIASTOLIC BLOOD PRESSURE: 80 MMHG | OXYGEN SATURATION: 96 % | RESPIRATION RATE: 16 BRPM

## 2025-07-15 DIAGNOSIS — K21.00 GASTROESOPHAGEAL REFLUX DISEASE WITH ESOPHAGITIS WITHOUT HEMORRHAGE: ICD-10-CM

## 2025-07-15 DIAGNOSIS — M79.662 PAIN OF LEFT LOWER LEG: ICD-10-CM

## 2025-07-15 DIAGNOSIS — M48.20 BAASTRUP'S SYNDROME: ICD-10-CM

## 2025-07-15 DIAGNOSIS — N28.1 RENAL CYST: ICD-10-CM

## 2025-07-15 DIAGNOSIS — M54.50 CHRONIC RIGHT-SIDED LOW BACK PAIN WITHOUT SCIATICA: ICD-10-CM

## 2025-07-15 DIAGNOSIS — G89.29 CHRONIC RIGHT-SIDED LOW BACK PAIN WITHOUT SCIATICA: ICD-10-CM

## 2025-07-15 DIAGNOSIS — Z00.00 ENCOUNTER FOR ANNUAL WELLNESS VISIT (AWV) IN MEDICARE PATIENT: Primary | ICD-10-CM

## 2025-07-15 DIAGNOSIS — Z23 NEED FOR VACCINATION: ICD-10-CM

## 2025-07-15 DIAGNOSIS — I48.19 PERSISTENT ATRIAL FIBRILLATION (H): ICD-10-CM

## 2025-07-15 DIAGNOSIS — E78.5 HYPERLIPIDEMIA, UNSPECIFIED HYPERLIPIDEMIA TYPE: ICD-10-CM

## 2025-07-15 DIAGNOSIS — I10 BENIGN ESSENTIAL HYPERTENSION: ICD-10-CM

## 2025-07-15 DIAGNOSIS — E11.9 DIABETES MELLITUS TYPE 2, NONINSULIN DEPENDENT (H): ICD-10-CM

## 2025-07-15 LAB
ANION GAP SERPL CALCULATED.3IONS-SCNC: 11 MMOL/L (ref 7–15)
BUN SERPL-MCNC: 25 MG/DL (ref 8–23)
CALCIUM SERPL-MCNC: 9.7 MG/DL (ref 8.8–10.4)
CHLORIDE SERPL-SCNC: 104 MMOL/L (ref 98–107)
CREAT SERPL-MCNC: 0.85 MG/DL (ref 0.67–1.17)
EGFRCR SERPLBLD CKD-EPI 2021: 88 ML/MIN/1.73M2
ERYTHROCYTE [DISTWIDTH] IN BLOOD BY AUTOMATED COUNT: 12.4 % (ref 10–15)
EST. AVERAGE GLUCOSE BLD GHB EST-MCNC: 146 MG/DL
GLUCOSE SERPL-MCNC: 137 MG/DL (ref 70–99)
HBA1C MFR BLD: 6.7 % (ref 0–5.6)
HCO3 SERPL-SCNC: 25 MMOL/L (ref 22–29)
HCT VFR BLD AUTO: 47.6 % (ref 40–53)
HGB BLD-MCNC: 15.2 G/DL (ref 13.3–17.7)
MCH RBC QN AUTO: 30.3 PG (ref 26.5–33)
MCHC RBC AUTO-ENTMCNC: 31.9 G/DL (ref 31.5–36.5)
MCV RBC AUTO: 95 FL (ref 78–100)
PLATELET # BLD AUTO: 193 10E3/UL (ref 150–450)
POTASSIUM SERPL-SCNC: 4.8 MMOL/L (ref 3.4–5.3)
RBC # BLD AUTO: 5.01 10E6/UL (ref 4.4–5.9)
SODIUM SERPL-SCNC: 140 MMOL/L (ref 135–145)
TSH SERPL DL<=0.005 MIU/L-ACNC: 0.72 UIU/ML (ref 0.3–4.2)
WBC # BLD AUTO: 7.5 10E3/UL (ref 4–11)

## 2025-07-15 PROCEDURE — 83036 HEMOGLOBIN GLYCOSYLATED A1C: CPT | Performed by: PHYSICIAN ASSISTANT

## 2025-07-15 PROCEDURE — 80048 BASIC METABOLIC PNL TOTAL CA: CPT | Performed by: PHYSICIAN ASSISTANT

## 2025-07-15 PROCEDURE — G2211 COMPLEX E/M VISIT ADD ON: HCPCS | Performed by: PHYSICIAN ASSISTANT

## 2025-07-15 PROCEDURE — 84443 ASSAY THYROID STIM HORMONE: CPT | Performed by: PHYSICIAN ASSISTANT

## 2025-07-15 PROCEDURE — 3079F DIAST BP 80-89 MM HG: CPT | Performed by: PHYSICIAN ASSISTANT

## 2025-07-15 PROCEDURE — 36415 COLL VENOUS BLD VENIPUNCTURE: CPT | Performed by: PHYSICIAN ASSISTANT

## 2025-07-15 PROCEDURE — 85027 COMPLETE CBC AUTOMATED: CPT | Performed by: PHYSICIAN ASSISTANT

## 2025-07-15 PROCEDURE — 99214 OFFICE O/P EST MOD 30 MIN: CPT | Mod: 25 | Performed by: PHYSICIAN ASSISTANT

## 2025-07-15 PROCEDURE — G0439 PPPS, SUBSEQ VISIT: HCPCS | Performed by: PHYSICIAN ASSISTANT

## 2025-07-15 PROCEDURE — 3075F SYST BP GE 130 - 139MM HG: CPT | Performed by: PHYSICIAN ASSISTANT

## 2025-07-15 ASSESSMENT — ENCOUNTER SYMPTOMS
PALPITATIONS: 0
HEMATURIA: 0
DYSURIA: 0
SHORTNESS OF BREATH: 0
COLOR CHANGE: 0
ABDOMINAL PAIN: 0
VOMITING: 0
FEVER: 0
ARTHRALGIAS: 0
SEIZURES: 0
BACK PAIN: 0
SORE THROAT: 0
EYE PAIN: 0
CHILLS: 0
COUGH: 0

## 2025-07-15 NOTE — PROGRESS NOTES
Preventive Care Visit  [facility]  Gerald Mota PA-C, Family Medicine  Jul 15, 2025      Assessment & Plan     Encounter for annual wellness visit (AWV) in Medicare patient  Overall doing well.  Gaps in his care have been closed.  Healthy lifestyle discussed.  Labs updated    Benign essential hypertension  Blood pressure well-controlled at 138/80.  Will continue his current antihypertension medications at this time  - Basic metabolic panel  (Ca, Cl, CO2, Creat, Gluc, K, Na, BUN); Future    Hyperlipidemia, unspecified hyperlipidemia type  Last lipid panel as of 1/2025 stable with an LDL 64, triglycerides 70, HDL of 45.  He continues on Lipitor 40 mg daily.  He is to continue to watch diet and stay active.  Recent Labs   Lab Test 01/13/25  1027 01/10/24  1200   CHOL 123 130   HDL 45 42   LDL 64 67   TRIG 70 105     Diabetes mellitus type 2, noninsulin dependent (H)  Currently diet controlled.  Last A1c as of 1/2025 stable at 6.4.  He is watching his diet and trying to stay active.  Last microalbumin did show some mild proteinuria which we will continue to monitor.  Recommended diabetic eye exam annually.  Will update labs including a BMP and an A1c today.    - CBC with platelets; Future  - Basic metabolic panel  (Ca, Cl, CO2, Creat, Gluc, K, Na, BUN); Future  - Hemoglobin A1c; Future  Hemoglobin A1C   Date Value Ref Range Status   01/13/2025 6.4 (H) 0.0 - 5.6 % Final     Comment:     Normal <5.7%   Prediabetes 5.7-6.4%    Diabetes 6.5% or higher     Note: Adopted from ADA consensus guidelines.     Persistent atrial fibrillation (H)  Found to be in atrial fibrillation during preop exam for a lumbar ablation December 2024.  He also underwent cardioversion in the ER at that time.  He has since followed up with cardiology as of 3/2025.  He does not appear to be in A-fib today.  Rate controlled on amiodarone.  ZIO monitor done in February 2025 showed normal sinus rhythm but did not show any  evidence of atrial fibrillation.  They did discuss doing a ablation to try to get him off of the amlodipine.  He has not noticed any symptoms.  No palpitations, shortness of breath, lightheaded or dizziness.  He continues on Eliquis and amlodipine.  He has been very careful about falls.  He is interested in doing an ablation recommended following up with cardiology.  Since he is on amlodipine we will recheck a TSH level.  His last TSH level 6 months ago was stable at 1.13.  - TSH with free T4 reflex; Future    Lumbar stenosis  Baastrups's syndrom  Status post ablation in February 2024.  Feels that he has had some improvement in his lower back pain since the ablation.  No new symptoms.    Renal cyst  Renal cysts noted on CT abdomen pelvis.  Follow-up ultrasound 3/2025 showed bilateral renal cysts which did not require any further evaluation.    Gastroesophageal reflux disease with esophagitis without hemorrhage  Symptoms well-controlled on omeprazole    Pain of left lower leg  1 week ago was walking up his steps and tripped over the top step.  Since then has been having pain to the posterior aspect of his leg.  The pain is worse with going up the stairs.  Does not notice any significant weakness.  On exam he does have some ecchymosis along the posterior aspect of his leg.  He also has a moderate amount of swelling and edema to the proximal aspect of his hamstring.  There is no redness or warmth.  No evidence of infection.  The amount of swelling and ecchymosis would be worried about a possible hamstring tear.  Will do further evaluation with an MRI and have him follow-up with [facility] based on MRI results.  He is to continue icing and Tylenol as this has been helpful.  Try to limit stairs.  He did buy a walker as well.  - MR Femur Thigh Left wo Contrast; Future  - Orthopedic  Referral; Future    The longitudinal plan of care for the diagnosis(es)/condition(s) as documented were addressed during this visit. Due to  "the added complexity in care, I will continue to support Ruy in the subsequent management and with ongoing continuity of care.      Patient has been advised of split billing requirements and indicates understanding: Yes    BMI  Estimated body mass index is 26.5 kg/m  as calculated from the following:    Height as of this encounter: 1.905 m (6' 3\").    Weight as of this encounter: 96.2 kg (212 lb).   Weight management plan: Discussed healthy diet and exercise guidelines    Counseling  Appropriate preventive services were addressed with this patient via screening, questionnaire, or discussion as appropriate for fall prevention, nutrition, physical activity, Tobacco-use cessation, social engagement, weight loss and cognition.  Checklist reviewing preventive services available has been given to the patient.  Reviewed patient's diet, addressing concerns and/or questions.   Reviewed preventive health counseling, as reflected in patient instructions    Follow-up    Follow-up Visit   Expected date:  Christopher 15, 2026 (Approximate)      Follow Up Appointment Details:     Follow-up with whom?: Me    Follow-Up for what?: Chronic Disease f/u    Chronic Disease f/u: General (Other)    How?: In Person                 Subjective   Ruy is a 80 year old, presenting for the following:  Annual Visit (Fasting Annual Medicare Wellness)        7/15/2025     9:43 AM   Additional Questions   Roomed by Muna Castrejon   Accompanied by brendan          Hayes is a pleasant 80-year-old male presents to the clinic today for a Medicare annual wellness visit.  Past medical history significant for hypertension, hyperlipidemia, type 2 diabetes diet-controlled, GERD, atrial fibrillation, and chronic lower back pain.  Overall is doing well.    1 week ago was walking up the steps tripped over the top step and fell.  He did not hit his head.  Since then he has been having pain to the posterior aspect of his left leg.  He has been using ice and Tylenol which " has been helpful.  He has not noticed any weakness.  He states that the pain is worse with walking up steps.  The pain is okay with walking in general.        Advance Care Planning    Discussed advance care planning with patient; however, patient declined at this time.        7/10/2025   General Health   How would you rate your overall physical health? (!) FAIR   Feel stress (tense, anxious, or unable to sleep) Not at all         7/10/2025   Nutrition   Diet: Regular (no restrictions)         7/10/2025   Exercise   Days per week of moderate/strenous exercise Patient declined   Average minutes spent exercising at this level Patient declined         7/10/2025   Social Factors   Frequency of gathering with friends or relatives More than three times a week   Worry food won't last until get money to buy more No   Food not last or not have enough money for food? No   Do you have housing? (Housing is defined as stable permanent housing and does not include staying outside in a car, in a tent, in an abandoned building, in an overnight shelter, or couch-surfing.) Yes   Are you worried about losing your housing? No   Lack of transportation? No   Unable to get utilities (heat,electricity)? No         7/15/2025   Fall Risk   Reason Gait Speed Test Not Completed Patient does not tolerate an upright or standing position (e.g. wheelchair)          7/10/2025   Activities of Daily Living- Home Safety   Needs help with the following daily activites None of the above   Safety concerns in the home None of the above         7/10/2025   Dental   Dentist two times every year? Yes         7/10/2025   Hearing Screening   Hearing concerns? None of the above         7/10/2025   Driving Risk Screening   Patient/family members have concerns about driving No         7/10/2025   General Alertness/Fatigue Screening   Have you been more tired than usual lately? No         7/10/2025   Urinary Incontinence Screening   Bothered by leaking urine in  past 6 months No         Today's PHQ-2 Score:       7/15/2025     9:46 AM   PHQ-2 (  [employer])   Q1: Little interest or pleasure in doing things 0    Q2: Feeling down, depressed or hopeless 0    PHQ-2 Score 0    Q1: Little interest or pleasure in doing things Not at all   Q2: Feeling down, depressed or hopeless Not at all   PHQ-2 Score 0       Proxy-reported           7/10/2025   Substance Use   Alcohol more than 3/day or more than 7/wk No   Do you have a current opioid prescription? No   How severe/bad is pain from 1 to 10? /10   Do you use any other substances recreationally? No     Social History     Tobacco Use    Smoking status: Former     Current packs/day: 0.00     Types: Cigarettes     Quit date: 1969     Years since quittin.5     Passive exposure: Never    Smokeless tobacco: Never   Vaping Use    Vaping status: Never Used   Substance Use Topics    Alcohol use: Yes     Alcohol/week: 1.0 standard drink of alcohol     Comment: Alcoholic Drinks/day: occassional    Drug use: No               Reviewed and updated as needed this visit by Provider                    Past Medical History:   Diagnosis Date    Benign essential hypertension 2018    Diabetes mellitus type 2, noninsulin dependent (H) 2018    Hyperlipemia     Reflux esophagitis      Past Surgical History:   Procedure Laterality Date    CHOLECYSTECTOMY  2014    dr. Way at [facility]    HC REMOVAL OF TONSILS,<11 Y/O      Description: Tonsillectomy;  Recorded: 2008;    INGUINAL HERNIA REPAIR Left 2016    Procedure: LEFT INGUINAL HERNIA REPAIR;  Surgeon: Drew Way MD;  Location: [facility];  Service:     OTHER SURGICAL HISTORY [facility]    TONSILLECTOMY       BP Readings from Last 3 Encounters:   07/15/25 138/80   25 (!) 148/80   25 132/74    Wt Readings from Last 3 Encounters:    07/15/25 96.2 kg (212 lb)   25 95.3 kg (210 lb 3.2 oz)   25 95.7 kg (211 lb)                  Patient Active Problem List   Diagnosis    Male Erectile Disorder    Hyperlipidemia    Chronic Reflux Esophagitis    Diabetes mellitus type 2, noninsulin dependent (H)    Benign essential hypertension     Past Surgical History:   Procedure Laterality Date    CHOLECYSTECTOMY  2014    dr. Way [facility]    HC REMOVAL OF TONSILS,<13 Y/O      Description: Tonsillectomy;  Recorded: 2008;    INGUINAL HERNIA REPAIR Left 2016    Procedure: LEFT INGUINAL HERNIA REPAIR;  Surgeon: Drew Way MD;  Location: [facility];  Service:     OTHER SURGICAL HISTORY [facility]    TONSILLECTOMY         Social History     Tobacco Use    Smoking status: Former     Current packs/day: 0.00     Types: Cigarettes     Quit date: 1969     Years since quittin.5     Passive exposure: Never    Smokeless tobacco: Never   Substance Use Topics    Alcohol use: Yes     Alcohol/week: 1.0 standard drink of alcohol     Comment: Alcoholic Drinks/day: occassional     Family History   Problem Relation Age of Onset    Prostate Cancer Father 71.00        early 70s         Current Outpatient Medications   Medication Sig Dispense Refill    amiodarone (PACERONE) 200 MG tablet Take 1 tablet (200 mg) by mouth daily. 90 tablet 1    apixaban ANTICOAGULANT (ELIQUIS) 5 MG tablet Take 1 tablet (5 mg) by mouth 2 times daily. 180 tablet 3    atorvastatin (LIPITOR) 40 MG tablet TAKE ONE TABLET BY MOUTH ONE TIME DAILY 90 tablet 1    Chondroitin Sulfate 400 MG CAPS Take 1,200 mg by mouth daily.      cyclobenzaprine (FLEXERIL) 5 MG tablet Take 1-2 tablets (5-10 mg) by mouth 3 times daily as needed for muscle spasms 30 tablet 2    Glucos-MSM-C-Pb-Tvgcla-Rneoce (GLUCOSAMINE MSM COMPLEX) TABS tablet Take 2 tablets by mouth  daily.      losartan (COZAAR) 50 MG tablet Take 1 tablet (50 mg) by mouth daily. 90 tablet 3    multivitamin therapeutic (THERAGRAN) tablet [MULTIVITAMIN THERAPEUTIC (THERAGRAN) TABLET] Take 1 tablet by mouth daily.      omeprazole (PRILOSEC) 20 MG DR capsule Take 1 capsule (20 mg) by mouth every other day. Take first thing in AM on empty stomach 90 capsule 1     Recent Labs   Lab Test 01/13/25  1027 12/05/24  1107 11/05/24  1125 07/12/24  1127 01/10/24  1200 05/30/23  0855 11/30/22  0959 09/30/21  1236 04/15/21  1352 10/07/20  1108   A1C 6.4*  --   --  6.6* 6.7*   < > 6.8*   < > 6.6* 6.1*   LDL 64  --   --   --  67  --  64   < >  --   --    HDL 45  --   --   --  42  --  41   < >  --   --    TRIG 70  --   --   --  105  --  105   < >  --   --    ALT 18 22  --   --  21  --  16   < >  --   --    CR 0.90  --  0.84 0.82 0.81   < > 0.80   < > 0.86 0.78   GFRESTIMATED 87  --  89 89 90   < > >90   < > >60 >60   GFRESTBLACK  --   --   --   --   --   --   --   --  >60 >60   POTASSIUM 4.8  --  3.7 4.4 4.9   < > 4.4   < > 4.2 4.2   TSH 1.13  --  0.99  --   --   --   --   --   --   --     < > = values in this interval not displayed.      Current providers sharing in care for this patient include:  Patient Care Team:  Gerald Mota PA-C as PCP - General (Family Medicine)  Gerald Mota PA-C as Assigned PCP  Caesar Turner PA-C as Assigned Heart and Vascular Provider    The following health maintenance items are reviewed in Epic and correct as of today:  Health Maintenance   Topic Date Due    DTAP/TDAP/TD VACCINE (2 - Td or Tdap) 05/29/2022    DIABETIC FOOT EXAM  09/30/2022    ANNUAL REVIEW OF HM ORDERS  05/18/2023    COVID-19 VACCINE (9 - 2024-25 season) 04/08/2025    A1C  07/13/2025    INFLUENZA VACCINE (1) 09/01/2025    EYE EXAM  10/02/2025    BMP  01/13/2026    LIPID  01/13/2026    MICROALBUMIN  01/13/2026    MEDICARE ANNUAL WELLNESS VISIT  07/15/2026    FALL RISK ASSESSMENT  07/15/2026    COLORECTAL CANCER  "SCREENING  06/27/2028    ADVANCE CARE PLANNING  07/15/2030    PHQ-2 (once per calendar year)  Completed    PNEUMOCOCCAL VACCINE 50+ YEARS  Completed    ZOSTER VACCINE  Completed    RSV VACCINE  Completed    HPV VACCINE  Aged Out    MENINGITIS VACCINE  Aged Out       Review of Systems   Constitutional:  Negative for chills and fever.   HENT:  Negative for ear pain and sore throat.    Eyes:  Negative for pain and visual disturbance.   Respiratory:  Negative for cough and shortness of breath.    Cardiovascular:  Negative for chest pain and palpitations.   Gastrointestinal:  Negative for abdominal pain and vomiting.   Genitourinary:  Negative for dysuria and hematuria.   Musculoskeletal:  Negative for arthralgias and back pain.        Posterior left leg pain after fall 1 week ago.    Skin:  Negative for color change and rash.   Neurological:  Negative for seizures and syncope.   All other systems reviewed and are negative.         Objective    Exam  /80 (BP Location: Right arm, Patient Position: Sitting, Cuff Size: Adult Regular)   Pulse 64   Temp 98.6  F (37  C) (Oral)   Resp 16   Ht 1.905 m (6' 3\")   Wt 96.2 kg (212 lb)   SpO2 96%   BMI 26.50 kg/m     Estimated body mass index is 26.5 kg/m  as calculated from the following:    Height as of this encounter: 1.905 m (6' 3\").    Weight as of this encounter: 96.2 kg (212 lb).    Physical Exam  Vitals and nursing note reviewed.   Constitutional:       Appearance: Normal appearance.   HENT:      Head: Normocephalic and atraumatic.   Eyes:      Conjunctiva/sclera: Conjunctivae normal.   Cardiovascular:      Rate and Rhythm: Normal rate and regular rhythm.      Heart sounds: No murmur heard.     No friction rub. No gallop.   Pulmonary:      Effort: Pulmonary effort is normal.      Breath sounds: No wheezing, rhonchi or rales.   Musculoskeletal:        Legs:       Comments: Ecchymosis to the distal aspect of posterior thigh with moderate amount of swelling to the " distal aspect thigh.  No palpable tenderness with exam.  No redness or warmth.  No evidence of infection.  No weakness to the lower extremity   Skin:     General: Skin is warm and dry.      Findings: No rash.   Neurological:      General: No focal deficit present.      Mental Status: He is alert.      Motor: No weakness.   Psychiatric:         Mood and Affect: Mood normal.         Behavior: Behavior normal.         Thought Content: Thought content normal.         Judgment: Judgment normal.       Gait and balance assessed per Gait Speed Test.  Result as above.        7/15/2025   Mini Cog   Clock Draw Score 2 Normal   3 Item Recall 3 objects recalled   Mini Cog Total Score 5              Signed Electronically by: Gerald Mota PA-C

## 2025-07-16 ENCOUNTER — PATIENT OUTREACH (OUTPATIENT)
Dept: CARE COORDINATION | Facility: CLINIC | Age: 80
End: 2025-07-16
Payer: COMMERCIAL

## 2025-07-26 ENCOUNTER — HOSPITAL ENCOUNTER (OUTPATIENT)
Dept: MRI IMAGING | Facility: CLINIC | Age: 80
Discharge: HOME | End: 2025-07-26
Attending: PHYSICIAN ASSISTANT | Admitting: PHYSICIAN ASSISTANT
Payer: COMMERCIAL

## 2025-07-26 DIAGNOSIS — M79.662 PAIN OF LEFT LOWER LEG: ICD-10-CM

## 2025-07-26 PROCEDURE — 73718 MRI LOWER EXTREMITY W/O DYE: CPT | Mod: LT

## 2025-07-28 ENCOUNTER — PATIENT OUTREACH (OUTPATIENT)
Dept: CARE COORDINATION | Facility: CLINIC | Age: 80
End: 2025-07-28
Payer: COMMERCIAL

## 2025-07-30 ENCOUNTER — PATIENT OUTREACH (OUTPATIENT)
Dept: CARE COORDINATION | Facility: CLINIC | Age: 80
End: 2025-07-30
Payer: COMMERCIAL

## 2025-09-04 DIAGNOSIS — I48.19 PERSISTENT ATRIAL FIBRILLATION (H): ICD-10-CM

## 2025-09-04 RX ORDER — AMIODARONE HYDROCHLORIDE 200 MG/1
200 TABLET ORAL DAILY
Qty: 90 TABLET | Refills: 1 | Status: SHIPPED | OUTPATIENT
Start: 2025-09-04